# Patient Record
Sex: FEMALE | Race: OTHER | Employment: FULL TIME | URBAN - METROPOLITAN AREA
[De-identification: names, ages, dates, MRNs, and addresses within clinical notes are randomized per-mention and may not be internally consistent; named-entity substitution may affect disease eponyms.]

---

## 2017-05-18 ENCOUNTER — HOSPITAL ENCOUNTER (OUTPATIENT)
Dept: ULTRASOUND IMAGING | Facility: HOSPITAL | Age: 32
Discharge: HOME | End: 2017-05-18
Attending: OBSTETRICS & GYNECOLOGY
Payer: COMMERCIAL

## 2017-05-18 ENCOUNTER — OFFICE VISIT (OUTPATIENT)
Dept: OBGYN CLINIC | Facility: CLINIC | Age: 32
End: 2017-05-18

## 2017-05-18 VITALS
SYSTOLIC BLOOD PRESSURE: 127 MMHG | BODY MASS INDEX: 27 KG/M2 | HEART RATE: 79 BPM | WEIGHT: 152 LBS | DIASTOLIC BLOOD PRESSURE: 91 MMHG

## 2017-05-18 DIAGNOSIS — N83.201 HEMORRHAGIC CYST OF RIGHT OVARY: ICD-10-CM

## 2017-05-18 DIAGNOSIS — R10.2 PELVIC PAIN IN FEMALE: Primary | ICD-10-CM

## 2017-05-18 DIAGNOSIS — R10.2 PELVIC PAIN IN FEMALE: ICD-10-CM

## 2017-05-18 PROCEDURE — 76830 TRANSVAGINAL US NON-OB: CPT | Performed by: OBSTETRICS & GYNECOLOGY

## 2017-05-18 PROCEDURE — 76856 US EXAM PELVIC COMPLETE: CPT | Performed by: OBSTETRICS & GYNECOLOGY

## 2017-05-18 PROCEDURE — 93975 VASCULAR STUDY: CPT | Performed by: OBSTETRICS & GYNECOLOGY

## 2017-05-18 RX ORDER — PRENATAL VIT/IRON FUM/FOLIC AC 27MG-0.8MG
1 TABLET ORAL DAILY
COMMUNITY
End: 2017-08-10

## 2017-05-18 RX ORDER — ONDANSETRON 4 MG/1
4 TABLET, ORALLY DISINTEGRATING ORAL
COMMUNITY
Start: 2017-05-17 | End: 2017-05-18

## 2017-05-18 RX ORDER — HYDROCODONE BITARTRATE AND ACETAMINOPHEN 5; 325 MG/1; MG/1
1 TABLET ORAL
COMMUNITY
Start: 2017-05-17 | End: 2017-05-29

## 2017-05-18 RX ORDER — CHOLECALCIFEROL (VITAMIN D3) 125 MCG
1 CAPSULE ORAL
COMMUNITY
End: 2018-04-02

## 2017-05-18 NOTE — PROGRESS NOTES
HPI:    Patient ID: Margarita Elliott is a 34year old female. HPI Dr Hawk Granados is nulligravida with menses q 29-31d and no BC since January. She was in [facility] yesterday and had acute onset pelvic pain.  Seen in [facility]. [address] confirmed a 7-8 cm f PHYSICAL EXAM:   Physical Exam   Genitourinary:   EG, vagina and cervix w/o lesions. Uterus NSSC and no adnexal mass but definitive rt adnexal tenderness w/o guard.                    ASSESSMENT/PLAN:   Pelvic pain in female  (primary encounter diagnosis)

## 2017-05-19 ENCOUNTER — TELEPHONE (OUTPATIENT)
Dept: OBGYN CLINIC | Facility: CLINIC | Age: 32
End: 2017-05-19

## 2017-07-05 ENCOUNTER — TELEPHONE (OUTPATIENT)
Dept: PEDIATRICS CLINIC | Facility: CLINIC | Age: 32
End: 2017-07-05

## 2017-07-05 DIAGNOSIS — N83.291 COMPLEX CYST OF RIGHT OVARY: Primary | ICD-10-CM

## 2017-07-06 NOTE — TELEPHONE ENCOUNTER
Spoke with Dr Beatriz Beavers and order sent. We'll try to schedule an annual exam shortly after the scheduled US.  Tuesdays would be best.

## 2017-07-11 ENCOUNTER — HOSPITAL ENCOUNTER (OUTPATIENT)
Dept: ULTRASOUND IMAGING | Facility: HOSPITAL | Age: 32
Discharge: HOME | End: 2017-07-11
Attending: OBSTETRICS & GYNECOLOGY
Payer: COMMERCIAL

## 2017-07-11 DIAGNOSIS — N83.291 COMPLEX CYST OF RIGHT OVARY: ICD-10-CM

## 2017-07-11 PROCEDURE — 76856 US EXAM PELVIC COMPLETE: CPT | Performed by: OBSTETRICS & GYNECOLOGY

## 2017-07-11 PROCEDURE — 93975 VASCULAR STUDY: CPT | Performed by: OBSTETRICS & GYNECOLOGY

## 2017-07-11 PROCEDURE — 76830 TRANSVAGINAL US NON-OB: CPT | Performed by: OBSTETRICS & GYNECOLOGY

## 2017-07-18 ENCOUNTER — TELEPHONE (OUTPATIENT)
Dept: OBGYN CLINIC | Facility: CLINIC | Age: 32
End: 2017-07-18

## 2017-07-18 NOTE — TELEPHONE ENCOUNTER
I had discussed US results with Dr Marilyn Elliott last week. There is a persistent complex right ovarian cystic structure. I suspect cystadenoma vs endometrioma though no current pain.   I recommended laparoscopic cystectomy though we can't r/o possible oophorectomy

## 2017-07-18 NOTE — TELEPHONE ENCOUNTER
Patient is scheduled 8/18/17 1pm ROS[facility]NI/[facility]. Pat orders routed. Instructions routed via interoffice mail as requested.

## 2017-08-07 NOTE — TELEPHONE ENCOUNTER
Per Fannie at . [address] PPO of [facility] Arrant outpatient procedures do not require prior auth Ref# [id number].

## 2017-08-18 ENCOUNTER — ANESTHESIA EVENT (OUTPATIENT)
Dept: SURGERY | Facility: HOSPITAL | Age: 32
End: 2017-08-18
Payer: COMMERCIAL

## 2017-08-18 ENCOUNTER — SURGERY (OUTPATIENT)
Age: 32
End: 2017-08-18

## 2017-08-18 ENCOUNTER — ANESTHESIA (OUTPATIENT)
Dept: SURGERY | Facility: HOSPITAL | Age: 32
End: 2017-08-18
Payer: COMMERCIAL

## 2017-08-18 ENCOUNTER — HOSPITAL ENCOUNTER (OUTPATIENT)
Facility: HOSPITAL | Age: 32
Setting detail: HOSPITAL OUTPATIENT SURGERY
Discharge: HOME | End: 2017-08-18
Attending: OBSTETRICS & GYNECOLOGY | Admitting: OBSTETRICS & GYNECOLOGY
Payer: COMMERCIAL

## 2017-08-18 VITALS
HEART RATE: 71 BPM | DIASTOLIC BLOOD PRESSURE: 82 MMHG | OXYGEN SATURATION: 98 % | HEIGHT: 64 IN | WEIGHT: 149 LBS | SYSTOLIC BLOOD PRESSURE: 128 MMHG | BODY MASS INDEX: 25.44 KG/M2 | RESPIRATION RATE: 16 BRPM | TEMPERATURE: 98 F

## 2017-08-18 DIAGNOSIS — N83.201 RIGHT OVARIAN CYST: Primary | ICD-10-CM

## 2017-08-18 PROBLEM — N83.299 COMPLEX OVARIAN CYST: Status: ACTIVE | Noted: 2017-08-18

## 2017-08-18 PROCEDURE — 0UT54ZZ RESECTION OF RIGHT FALLOPIAN TUBE, PERCUTANEOUS ENDOSCOPIC APPROACH: ICD-10-PCS | Performed by: OBSTETRICS & GYNECOLOGY

## 2017-08-18 PROCEDURE — 58661 LAPAROSCOPY REMOVE ADNEXA: CPT | Performed by: OBSTETRICS & GYNECOLOGY

## 2017-08-18 PROCEDURE — 0UT04ZZ RESECTION OF RIGHT OVARY, PERCUTANEOUS ENDOSCOPIC APPROACH: ICD-10-PCS | Performed by: OBSTETRICS & GYNECOLOGY

## 2017-08-18 PROCEDURE — 0W9J4ZX DRAINAGE OF PELVIC CAVITY, PERCUTANEOUS ENDOSCOPIC APPROACH, DIAGNOSTIC: ICD-10-PCS | Performed by: OBSTETRICS & GYNECOLOGY

## 2017-08-18 RX ORDER — HYDROCODONE BITARTRATE AND ACETAMINOPHEN 5; 325 MG/1; MG/1
2 TABLET ORAL AS NEEDED
Status: DISCONTINUED | OUTPATIENT
Start: 2017-08-18 | End: 2017-08-18

## 2017-08-18 RX ORDER — GLYCOPYRROLATE 0.2 MG/ML
INJECTION INTRAMUSCULAR; INTRAVENOUS AS NEEDED
Status: DISCONTINUED | OUTPATIENT
Start: 2017-08-18 | End: 2017-08-18 | Stop reason: SURG

## 2017-08-18 RX ORDER — MORPHINE SULFATE 2 MG/ML
2 INJECTION, SOLUTION INTRAMUSCULAR; INTRAVENOUS EVERY 10 MIN PRN
Status: DISCONTINUED | OUTPATIENT
Start: 2017-08-18 | End: 2017-08-18

## 2017-08-18 RX ORDER — MIDAZOLAM HYDROCHLORIDE 1 MG/ML
INJECTION INTRAMUSCULAR; INTRAVENOUS AS NEEDED
Status: DISCONTINUED | OUTPATIENT
Start: 2017-08-18 | End: 2017-08-18 | Stop reason: SURG

## 2017-08-18 RX ORDER — MORPHINE SULFATE 4 MG/ML
6 INJECTION, SOLUTION INTRAMUSCULAR; INTRAVENOUS EVERY 10 MIN PRN
Status: DISCONTINUED | OUTPATIENT
Start: 2017-08-18 | End: 2017-08-18

## 2017-08-18 RX ORDER — HALOPERIDOL 5 MG/ML
0.25 INJECTION INTRAMUSCULAR ONCE AS NEEDED
Status: DISCONTINUED | OUTPATIENT
Start: 2017-08-18 | End: 2017-08-18

## 2017-08-18 RX ORDER — ONDANSETRON 2 MG/ML
4 INJECTION INTRAMUSCULAR; INTRAVENOUS ONCE AS NEEDED
Status: COMPLETED | OUTPATIENT
Start: 2017-08-18 | End: 2017-08-18

## 2017-08-18 RX ORDER — METOCLOPRAMIDE HYDROCHLORIDE 5 MG/ML
INJECTION INTRAMUSCULAR; INTRAVENOUS AS NEEDED
Status: DISCONTINUED | OUTPATIENT
Start: 2017-08-18 | End: 2017-08-18 | Stop reason: SURG

## 2017-08-18 RX ORDER — LIDOCAINE HYDROCHLORIDE 10 MG/ML
INJECTION, SOLUTION EPIDURAL; INFILTRATION; INTRACAUDAL; PERINEURAL AS NEEDED
Status: DISCONTINUED | OUTPATIENT
Start: 2017-08-18 | End: 2017-08-18 | Stop reason: SURG

## 2017-08-18 RX ORDER — HYDROCODONE BITARTRATE AND ACETAMINOPHEN 5; 325 MG/1; MG/1
1 TABLET ORAL AS NEEDED
Status: DISCONTINUED | OUTPATIENT
Start: 2017-08-18 | End: 2017-08-18

## 2017-08-18 RX ORDER — ROCURONIUM BROMIDE 10 MG/ML
INJECTION, SOLUTION INTRAVENOUS AS NEEDED
Status: DISCONTINUED | OUTPATIENT
Start: 2017-08-18 | End: 2017-08-18 | Stop reason: SURG

## 2017-08-18 RX ORDER — ONDANSETRON 2 MG/ML
4 INJECTION INTRAMUSCULAR; INTRAVENOUS EVERY 8 HOURS PRN
Status: DISCONTINUED | OUTPATIENT
Start: 2017-08-18 | End: 2017-08-18

## 2017-08-18 RX ORDER — HYDROMORPHONE HYDROCHLORIDE 1 MG/ML
0.6 INJECTION, SOLUTION INTRAMUSCULAR; INTRAVENOUS; SUBCUTANEOUS EVERY 5 MIN PRN
Status: DISCONTINUED | OUTPATIENT
Start: 2017-08-18 | End: 2017-08-18

## 2017-08-18 RX ORDER — SODIUM CHLORIDE, SODIUM LACTATE, POTASSIUM CHLORIDE, CALCIUM CHLORIDE 600; 310; 30; 20 MG/100ML; MG/100ML; MG/100ML; MG/100ML
INJECTION, SOLUTION INTRAVENOUS CONTINUOUS
Status: DISCONTINUED | OUTPATIENT
Start: 2017-08-18 | End: 2017-08-18

## 2017-08-18 RX ORDER — ONDANSETRON HYDROCHLORIDE 8 MG/1
4 TABLET, FILM COATED ORAL EVERY 8 HOURS PRN
Status: DISCONTINUED | OUTPATIENT
Start: 2017-08-18 | End: 2017-08-18

## 2017-08-18 RX ORDER — METOCLOPRAMIDE 10 MG/1
10 TABLET ORAL ONCE
Status: DISCONTINUED | OUTPATIENT
Start: 2017-08-18 | End: 2017-08-18 | Stop reason: HOSPADM

## 2017-08-18 RX ORDER — HYDROMORPHONE HYDROCHLORIDE 1 MG/ML
0.4 INJECTION, SOLUTION INTRAMUSCULAR; INTRAVENOUS; SUBCUTANEOUS EVERY 5 MIN PRN
Status: DISCONTINUED | OUTPATIENT
Start: 2017-08-18 | End: 2017-08-18

## 2017-08-18 RX ORDER — HYDROMORPHONE HYDROCHLORIDE 1 MG/ML
0.2 INJECTION, SOLUTION INTRAMUSCULAR; INTRAVENOUS; SUBCUTANEOUS EVERY 5 MIN PRN
Status: DISCONTINUED | OUTPATIENT
Start: 2017-08-18 | End: 2017-08-18

## 2017-08-18 RX ORDER — ONDANSETRON 2 MG/ML
INJECTION INTRAMUSCULAR; INTRAVENOUS AS NEEDED
Status: DISCONTINUED | OUTPATIENT
Start: 2017-08-18 | End: 2017-08-18 | Stop reason: SURG

## 2017-08-18 RX ORDER — KETOROLAC TROMETHAMINE 30 MG/ML
INJECTION, SOLUTION INTRAMUSCULAR; INTRAVENOUS AS NEEDED
Status: DISCONTINUED | OUTPATIENT
Start: 2017-08-18 | End: 2017-08-18 | Stop reason: SURG

## 2017-08-18 RX ORDER — DIPHENHYDRAMINE HYDROCHLORIDE 50 MG/ML
12.5 INJECTION INTRAMUSCULAR; INTRAVENOUS ONCE
Status: COMPLETED | OUTPATIENT
Start: 2017-08-18 | End: 2017-08-18

## 2017-08-18 RX ORDER — SODIUM CHLORIDE 0.9 % (FLUSH) 0.9 %
10 SYRINGE (ML) INJECTION AS NEEDED
Status: DISCONTINUED | OUTPATIENT
Start: 2017-08-18 | End: 2017-08-18 | Stop reason: HOSPADM

## 2017-08-18 RX ORDER — IBUPROFEN 600 MG/1
600 TABLET ORAL EVERY 6 HOURS PRN
Status: DISCONTINUED | OUTPATIENT
Start: 2017-08-18 | End: 2017-08-18

## 2017-08-18 RX ORDER — DEXAMETHASONE SODIUM PHOSPHATE 4 MG/ML
VIAL (ML) INJECTION AS NEEDED
Status: DISCONTINUED | OUTPATIENT
Start: 2017-08-18 | End: 2017-08-18 | Stop reason: SURG

## 2017-08-18 RX ORDER — ACETAMINOPHEN 325 MG/1
650 TABLET ORAL ONCE
Status: COMPLETED | OUTPATIENT
Start: 2017-08-18 | End: 2017-08-18

## 2017-08-18 RX ORDER — MORPHINE SULFATE 4 MG/ML
4 INJECTION, SOLUTION INTRAMUSCULAR; INTRAVENOUS EVERY 10 MIN PRN
Status: DISCONTINUED | OUTPATIENT
Start: 2017-08-18 | End: 2017-08-18

## 2017-08-18 RX ORDER — BUPIVACAINE HYDROCHLORIDE 2.5 MG/ML
INJECTION, SOLUTION EPIDURAL; INFILTRATION; INTRACAUDAL AS NEEDED
Status: DISCONTINUED | OUTPATIENT
Start: 2017-08-18 | End: 2017-08-18 | Stop reason: HOSPADM

## 2017-08-18 RX ORDER — NEOSTIGMINE METHYLSULFATE 0.5 MG/ML
INJECTION INTRAVENOUS AS NEEDED
Status: DISCONTINUED | OUTPATIENT
Start: 2017-08-18 | End: 2017-08-18 | Stop reason: SURG

## 2017-08-18 RX ORDER — IBUPROFEN 600 MG/1
600 TABLET ORAL EVERY 6 HOURS PRN
Qty: 30 TABLET | Refills: 0 | Status: SHIPPED | OUTPATIENT
Start: 2017-08-18 | End: 2018-03-13

## 2017-08-18 RX ORDER — HYDROCODONE BITARTRATE AND ACETAMINOPHEN 7.5; 325 MG/1; MG/1
1 TABLET ORAL EVERY 4 HOURS PRN
Status: DISCONTINUED | OUTPATIENT
Start: 2017-08-18 | End: 2017-08-18

## 2017-08-18 RX ORDER — FAMOTIDINE 20 MG/1
20 TABLET ORAL ONCE
Status: DISCONTINUED | OUTPATIENT
Start: 2017-08-18 | End: 2017-08-18 | Stop reason: HOSPADM

## 2017-08-18 RX ORDER — HYDROCODONE BITARTRATE AND ACETAMINOPHEN 7.5; 325 MG/1; MG/1
1 TABLET ORAL EVERY 6 HOURS PRN
Qty: 30 TABLET | Refills: 0 | Status: SHIPPED | OUTPATIENT
Start: 2017-08-18 | End: 2017-08-29

## 2017-08-18 RX ORDER — NALOXONE HYDROCHLORIDE 0.4 MG/ML
80 INJECTION, SOLUTION INTRAMUSCULAR; INTRAVENOUS; SUBCUTANEOUS AS NEEDED
Status: DISCONTINUED | OUTPATIENT
Start: 2017-08-18 | End: 2017-08-18

## 2017-08-18 RX ADMIN — MIDAZOLAM HYDROCHLORIDE 2 MG: 1 INJECTION INTRAMUSCULAR; INTRAVENOUS at 13:01:00

## 2017-08-18 RX ADMIN — KETOROLAC TROMETHAMINE 30 MG: 30 INJECTION, SOLUTION INTRAMUSCULAR; INTRAVENOUS at 14:34:00

## 2017-08-18 RX ADMIN — ROCURONIUM BROMIDE 30 MG: 10 INJECTION, SOLUTION INTRAVENOUS at 13:01:00

## 2017-08-18 RX ADMIN — GLYCOPYRROLATE 0.6 MG: 0.2 INJECTION INTRAMUSCULAR; INTRAVENOUS at 14:40:00

## 2017-08-18 RX ADMIN — METOCLOPRAMIDE HYDROCHLORIDE 10 MG: 5 INJECTION INTRAMUSCULAR; INTRAVENOUS at 13:56:00

## 2017-08-18 RX ADMIN — NEOSTIGMINE METHYLSULFATE 3 MG: 0.5 INJECTION INTRAVENOUS at 14:40:00

## 2017-08-18 RX ADMIN — LIDOCAINE HYDROCHLORIDE 50 MG: 10 INJECTION, SOLUTION EPIDURAL; INFILTRATION; INTRACAUDAL; PERINEURAL at 13:01:00

## 2017-08-18 RX ADMIN — ONDANSETRON 4 MG: 2 INJECTION INTRAMUSCULAR; INTRAVENOUS at 13:15:00

## 2017-08-18 RX ADMIN — SODIUM CHLORIDE, SODIUM LACTATE, POTASSIUM CHLORIDE, CALCIUM CHLORIDE: 600; 310; 30; 20 INJECTION, SOLUTION INTRAVENOUS at 14:55:00

## 2017-08-18 RX ADMIN — DEXAMETHASONE SODIUM PHOSPHATE 4 MG: 4 MG/ML VIAL (ML) INJECTION at 13:30:00

## 2017-08-18 NOTE — ANESTHESIA POSTPROCEDURE EVALUATION
Patient: Fatou Range    Procedure Summary     Date:  08/18/17 Room / Location:  [address] [place] [address] MAIN OR  / [address] [place] [address] MAIN OR    Anesthesia Start:  1301 Anesthesia Stop:      Procedures:       LAPAROSCOPIC OVARIAN CYSTECTOMY (N/A )      LAPAROSCOPIC OOPHORECTOMY (N/A

## 2017-08-18 NOTE — ANESTHESIA PREPROCEDURE EVALUATION
Anesthesia PreOp Note    HPI:     Gwen Chaney is a 34year old female who presents for preoperative consultation requested by: Mary Jo Willett DO    Date of Surgery: 8/18/2017    Procedure(s):  LAPAROSCOPIC OVARIAN CYSTECTOMY  LAPAROSCOPIC OOPHORECT Pressure Paternal Aunt    • High Cholesterol Paternal Aunt        Social History  Social History   Marital status:   Spouse name: N/A    Years of education: N/A  Number of children: N/A     Occupational History  None on file     Social History Main answered to the best of my ability. The patient desires the anesthetic management as planned.   [facility]  8/18/2017 12:36 PM

## 2017-08-18 NOTE — ANESTHESIA POSTPROCEDURE EVALUATION
Patient: Dottie Mcknight    Procedure Summary     Date:  08/18/17 Room / Location:  [address] [place] [address] MAIN OR 16 / [address] [place] [address] OR    Anesthesia Start:  1301 Anesthesia Stop:      Procedures:       LAPAROSCOPIC OVARIAN CYSTECTOMY (N/A )      LAPAROSCOPIC OOPHORECTOMY (N/A

## 2017-08-18 NOTE — INTERVAL H&P NOTE
Pre-op Diagnosis: right ovarian complex cyst    The above referenced H&P was reviewed by Oscar Coppola. [address] [place] [address] on 8/18/2017, the patient was examined and no significant changes have occurred in the patient's condition since the H&P was performed.   I discuss

## 2017-08-18 NOTE — BRIEF OP NOTE
Pre-Operative Diagnosis: right ovarian complex cyst     Post-Operative Diagnosis: right ovarian complex cyst     Procedure Performed:   Procedure(s):   laparoscopic right salpingo-ophrectomy, pelvic washings      Surgeon(s) and Role:     * Jose Cotter

## 2017-08-28 ENCOUNTER — TELEPHONE (OUTPATIENT)
Dept: INTERNAL MEDICINE CLINIC | Facility: CLINIC | Age: 32
End: 2017-08-28

## 2017-08-28 ENCOUNTER — TELEPHONE (OUTPATIENT)
Dept: OBGYN CLINIC | Facility: CLINIC | Age: 32
End: 2017-08-28

## 2017-08-28 NOTE — TELEPHONE ENCOUNTER
I spoke with Dr Sharon Peace concerning possible PO incision problem. I want to add her at 10 AM for 20 minutes tomorrow at [facility]. Please place on schedule. Dr Sharon Peace is aware to come at that time. Thanks!

## 2017-08-29 ENCOUNTER — OFFICE VISIT (OUTPATIENT)
Dept: OBGYN CLINIC | Facility: CLINIC | Age: 32
End: 2017-08-29

## 2017-08-29 VITALS
DIASTOLIC BLOOD PRESSURE: 96 MMHG | SYSTOLIC BLOOD PRESSURE: 148 MMHG | WEIGHT: 148.19 LBS | HEART RATE: 79 BPM | BODY MASS INDEX: 25 KG/M2

## 2017-08-29 DIAGNOSIS — Z98.890 POST-OPERATIVE STATE: Primary | ICD-10-CM

## 2017-08-29 PROCEDURE — 99024 POSTOP FOLLOW-UP VISIT: CPT | Performed by: OBSTETRICS & GYNECOLOGY

## 2017-08-29 RX ORDER — CHOLECALCIFEROL (VITAMIN D3) 125 MCG
1 CAPSULE ORAL
COMMUNITY
End: 2017-08-29

## 2017-09-04 PROBLEM — D39.11 OVARIAN TUMOR OF BORDERLINE MALIGNANCY, RIGHT: Status: ACTIVE | Noted: 2017-08-18

## 2017-09-05 NOTE — PROGRESS NOTES
HPI:    Patient ID: Orquidea Parada is a 34year old female. HPI Enma and I spoke over phone to discuss incisional concerns and I asked to see her today. She had some scant drainage on midline of suprapubic incision and questioning a sub- mass.   No fe of the defined types were placed in this encounter.       Meds This Visit:  No prescriptions requested or ordered in this encounter       Imaging & Referrals:  None       #[id number]

## 2017-09-11 ENCOUNTER — TELEPHONE (OUTPATIENT)
Dept: OBGYN CLINIC | Facility: CLINIC | Age: 32
End: 2017-09-11

## 2017-09-19 ENCOUNTER — LAB ENCOUNTER (OUTPATIENT)
Dept: LAB | Facility: HOSPITAL | Age: 32
End: 2017-09-19
Attending: PHYSICIAN ASSISTANT
Payer: COMMERCIAL

## 2017-09-19 DIAGNOSIS — C56.1 OVARIAN CANCER ON RIGHT (HCC): Primary | ICD-10-CM

## 2017-09-19 PROCEDURE — 36415 COLL VENOUS BLD VENIPUNCTURE: CPT

## 2017-09-19 PROCEDURE — 86304 IMMUNOASSAY TUMOR CA 125: CPT

## 2017-09-20 LAB — CANCER AG125 SERPL-ACNC: 15 U/ML (ref 0–35)

## 2017-10-02 ENCOUNTER — MED REC SCAN ONLY (OUTPATIENT)
Dept: OBGYN CLINIC | Facility: CLINIC | Age: 32
End: 2017-10-02

## 2017-10-31 ENCOUNTER — OFFICE VISIT (OUTPATIENT)
Dept: OBGYN CLINIC | Facility: CLINIC | Age: 32
End: 2017-10-31

## 2017-10-31 VITALS
DIASTOLIC BLOOD PRESSURE: 92 MMHG | HEIGHT: 63.5 IN | SYSTOLIC BLOOD PRESSURE: 140 MMHG | BODY MASS INDEX: 27.3 KG/M2 | WEIGHT: 156 LBS

## 2017-10-31 DIAGNOSIS — Z12.4 SCREENING FOR MALIGNANT NEOPLASM OF CERVIX: Primary | ICD-10-CM

## 2017-10-31 DIAGNOSIS — Z01.419 ENCOUNTER FOR GYNECOLOGICAL EXAMINATION WITHOUT ABNORMAL FINDING: ICD-10-CM

## 2017-10-31 DIAGNOSIS — N97.9 PRIMARY FEMALE INFERTILITY: ICD-10-CM

## 2017-10-31 PROCEDURE — 99395 PREV VISIT EST AGE 18-39: CPT | Performed by: OBSTETRICS & GYNECOLOGY

## 2017-10-31 NOTE — OPERATIVE REPORT
[facility]    PATIENT'S NAME: Alysha Levi   ATTENDING PHYSICIAN: Christiano Gamez DO   OPERATING PHYSICIAN: Merlin Pang A. Macduff, DO   PATIENT ACCOUNT#:   [de-identified]    LOCATION:  [facility]  MEDICAL RECORD #:   [id number]       DATE operating in another room. I asked him to come in and visualize the enlarged right ovary. After coming in, he made recommendations for the aforementioned washings and right salpingo-oophorectomy. He then had to go back to his operating suite.   We perfor

## 2017-11-01 NOTE — PROGRESS NOTES
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 HPI Nulligravida with regular menses and stopped BC 2 years ago. She has had regular IC with one partner (her ) since and no conception.  She was asked by me to avoid conception just no wheezes. She has no rales. Bilateral breasts without skin / nipple changes, mass, tenderness or axillary adenopathy. Abdominal: Soft. She exhibits no distension and no mass. There is no tenderness. There is no rebound and no guarding.    Gaby Wu

## 2017-11-24 ENCOUNTER — HOSPITAL ENCOUNTER (OUTPATIENT)
Dept: ULTRASOUND IMAGING | Facility: HOSPITAL | Age: 32
Discharge: HOME | End: 2017-11-24
Attending: OBSTETRICS & GYNECOLOGY
Payer: COMMERCIAL

## 2017-11-24 DIAGNOSIS — C56.1 OVARIAN CANCER, RIGHT (HCC): ICD-10-CM

## 2017-11-24 PROCEDURE — 76856 US EXAM PELVIC COMPLETE: CPT | Performed by: OBSTETRICS & GYNECOLOGY

## 2017-11-24 PROCEDURE — 76830 TRANSVAGINAL US NON-OB: CPT | Performed by: OBSTETRICS & GYNECOLOGY

## 2017-11-26 ENCOUNTER — APPOINTMENT (OUTPATIENT)
Dept: LAB | Facility: HOSPITAL | Age: 32
End: 2017-11-26
Attending: PHYSICIAN ASSISTANT
Payer: COMMERCIAL

## 2017-11-26 DIAGNOSIS — C56.1 OVARIAN CANCER ON RIGHT (HCC): ICD-10-CM

## 2017-11-26 PROCEDURE — 86304 IMMUNOASSAY TUMOR CA 125: CPT

## 2017-11-26 PROCEDURE — 36415 COLL VENOUS BLD VENIPUNCTURE: CPT

## 2017-12-01 ENCOUNTER — HOSPITAL ENCOUNTER (OUTPATIENT)
Dept: MRI IMAGING | Facility: HOSPITAL | Age: 32
Discharge: HOME | End: 2017-12-01
Attending: OBSTETRICS & GYNECOLOGY
Payer: COMMERCIAL

## 2017-12-01 DIAGNOSIS — C56.1 OVARIAN CA, RIGHT (HCC): ICD-10-CM

## 2017-12-01 DIAGNOSIS — R19.04 ABDOMINAL MASS, LLQ (LEFT LOWER QUADRANT): ICD-10-CM

## 2017-12-01 PROCEDURE — 72197 MRI PELVIS W/O & W/DYE: CPT | Performed by: OBSTETRICS & GYNECOLOGY

## 2017-12-01 PROCEDURE — A9575 INJ GADOTERATE MEGLUMI 0.1ML: HCPCS | Performed by: OBSTETRICS & GYNECOLOGY

## 2017-12-21 LAB
AMB EXT ANTIBODY SCREEN: NEGATIVE
AMB EXT HBSAG SCREEN: NEGATIVE
AMB EXT HEPATITIS C VIRUS ANTIBODY: NEGATIVE
AMB EXT HIV AG AB COMBO: NEGATIVE
AMB EXT RH FACTOR: POSITIVE
AMB EXT TREPONEMAL ANTIBODIES: NEGATIVE

## 2017-12-27 ENCOUNTER — LAB ENCOUNTER (OUTPATIENT)
Dept: LAB | Age: 32
End: 2017-12-27
Attending: OBSTETRICS & GYNECOLOGY
Payer: COMMERCIAL

## 2017-12-27 DIAGNOSIS — N97.9 INFERTILITY, FEMALE: Primary | ICD-10-CM

## 2017-12-27 PROCEDURE — 83001 ASSAY OF GONADOTROPIN (FSH): CPT

## 2017-12-27 PROCEDURE — 84144 ASSAY OF PROGESTERONE: CPT

## 2017-12-27 PROCEDURE — 83002 ASSAY OF GONADOTROPIN (LH): CPT

## 2017-12-27 PROCEDURE — 82670 ASSAY OF TOTAL ESTRADIOL: CPT

## 2017-12-27 PROCEDURE — 36415 COLL VENOUS BLD VENIPUNCTURE: CPT

## 2018-01-04 ENCOUNTER — TELEPHONE (OUTPATIENT)
Dept: INTERNAL MEDICINE CLINIC | Facility: CLINIC | Age: 33
End: 2018-01-04

## 2018-01-04 NOTE — TELEPHONE ENCOUNTER
Patient called asked all imaging, path reports and operative reports faxed to [facility] [phone] attn Chay Pinon. All reports faxed.

## 2018-02-07 ENCOUNTER — TELEPHONE (OUTPATIENT)
Dept: INTERNAL MEDICINE CLINIC | Facility: CLINIC | Age: 33
End: 2018-02-07

## 2018-02-07 DIAGNOSIS — Z00.00 ANNUAL PHYSICAL EXAM: Primary | ICD-10-CM

## 2018-02-07 DIAGNOSIS — D64.9 ANEMIA, UNSPECIFIED TYPE: ICD-10-CM

## 2018-02-22 ENCOUNTER — TELEPHONE (OUTPATIENT)
Dept: OBGYN CLINIC | Facility: CLINIC | Age: 33
End: 2018-02-22

## 2018-02-22 DIAGNOSIS — Z34.91 PRENATAL CARE IN FIRST TRIMESTER: Primary | ICD-10-CM

## 2018-02-22 DIAGNOSIS — Z86.03 HISTORY OF NEOPLASM OF OVARY WITH LOW MALIGNANT POTENTIAL: ICD-10-CM

## 2018-02-22 NOTE — TELEPHONE ENCOUNTER
Patient contacted me re: + HPT. Sure LMP of 01-19-18. She has known recent borderline tumor of right ovary and followed by Dr Toby Guillory. She was seeing Dr Shelbi Rizvi at [facility] and was about to start Clomid when she noted spontaneous pregnancy.   We'll st

## 2018-02-22 NOTE — TELEPHONE ENCOUNTER
Pt states that she had a home positive preg test.  Pt agrees to see all the MDs. Pt will start a pnv with dha. Informed pt to call us with any problems or spotting/bleeding.   Informed pt the first appt is with the nurse and to arrive 15 minutes early for

## 2018-02-23 ENCOUNTER — LAB ENCOUNTER (OUTPATIENT)
Dept: LAB | Facility: HOSPITAL | Age: 33
End: 2018-02-23
Attending: INTERNAL MEDICINE
Payer: COMMERCIAL

## 2018-02-23 DIAGNOSIS — D64.9 ANEMIA, UNSPECIFIED TYPE: ICD-10-CM

## 2018-02-23 DIAGNOSIS — Z34.91 PRENATAL CARE IN FIRST TRIMESTER: ICD-10-CM

## 2018-02-23 DIAGNOSIS — Z00.00 ANNUAL PHYSICAL EXAM: ICD-10-CM

## 2018-02-23 DIAGNOSIS — C56.1 OVARIAN CANCER ON RIGHT (HCC): ICD-10-CM

## 2018-02-23 DIAGNOSIS — Z86.03 HISTORY OF NEOPLASM OF OVARY WITH LOW MALIGNANT POTENTIAL: ICD-10-CM

## 2018-02-23 LAB
25(OH)D3 SERPL-MCNC: 23.7 NG/ML
ALBUMIN SERPL BCP-MCNC: 3.9 G/DL (ref 3.5–4.8)
ALBUMIN/GLOB SERPL: 1.2 {RATIO} (ref 1–2)
ALP SERPL-CCNC: 38 U/L (ref 32–100)
ALT SERPL-CCNC: 16 U/L (ref 14–54)
ANION GAP SERPL CALC-SCNC: 8 MMOL/L (ref 0–18)
AST SERPL-CCNC: 19 U/L (ref 15–41)
B-HCG SERPL-ACNC: NORMAL MIU/ML
BASOPHILS # BLD: 0 K/UL (ref 0–0.2)
BASOPHILS NFR BLD: 1 %
BILIRUB SERPL-MCNC: 0.4 MG/DL (ref 0.3–1.2)
BUN SERPL-MCNC: 13 MG/DL (ref 8–20)
BUN/CREAT SERPL: 17.6 (ref 10–20)
CALCIUM SERPL-MCNC: 9.2 MG/DL (ref 8.5–10.5)
CANCER AG125 SERPL-ACNC: 13 U/ML (ref 0–35)
CHLORIDE SERPL-SCNC: 107 MMOL/L (ref 95–110)
CHOLEST SERPL-MCNC: 141 MG/DL (ref 110–200)
CO2 SERPL-SCNC: 24 MMOL/L (ref 22–32)
CREAT SERPL-MCNC: 0.74 MG/DL (ref 0.5–1.5)
EOSINOPHIL # BLD: 0.1 K/UL (ref 0–0.7)
EOSINOPHIL NFR BLD: 2 %
ERYTHROCYTE [DISTWIDTH] IN BLOOD BY AUTOMATED COUNT: 14.6 % (ref 11–15)
FERRITIN SERPL IA-MCNC: 6 NG/ML (ref 11–307)
GLOBULIN PLAS-MCNC: 3.3 G/DL (ref 2.5–3.7)
GLUCOSE SERPL-MCNC: 101 MG/DL (ref 70–99)
HCT VFR BLD AUTO: 35.6 % (ref 35–48)
HDLC SERPL-MCNC: 56 MG/DL
HGB BLD-MCNC: 11.5 G/DL (ref 12–16)
IRON SATN MFR SERPL: 7 % (ref 15–50)
IRON SERPL-MCNC: 26 MCG/DL (ref 28–170)
LDLC SERPL CALC-MCNC: 76 MG/DL (ref 0–99)
LYMPHOCYTES # BLD: 2.9 K/UL (ref 1–4)
LYMPHOCYTES NFR BLD: 36 %
MCH RBC QN AUTO: 25.1 PG (ref 27–32)
MCHC RBC AUTO-ENTMCNC: 32.4 G/DL (ref 32–37)
MCV RBC AUTO: 77.4 FL (ref 80–100)
MONOCYTES # BLD: 0.6 K/UL (ref 0–1)
MONOCYTES NFR BLD: 7 %
NEUTROPHILS # BLD AUTO: 4.4 K/UL (ref 1.8–7.7)
NEUTROPHILS NFR BLD: 54 %
NONHDLC SERPL-MCNC: 85 MG/DL
OSMOLALITY UR CALC.SUM OF ELEC: 288 MOSM/KG (ref 275–295)
PATIENT FASTING: YES
PLATELET # BLD AUTO: 290 K/UL (ref 140–400)
PMV BLD AUTO: 8.3 FL (ref 7.4–10.3)
POTASSIUM SERPL-SCNC: 4.3 MMOL/L (ref 3.3–5.1)
PROT SERPL-MCNC: 7.2 G/DL (ref 5.9–8.4)
RBC # BLD AUTO: 4.6 M/UL (ref 3.7–5.4)
SODIUM SERPL-SCNC: 139 MMOL/L (ref 136–144)
TIBC SERPL-MCNC: 362 MCG/DL (ref 228–428)
TRANSFERRIN SERPL-MCNC: 274 MG/DL (ref 192–382)
TRIGL SERPL-MCNC: 43 MG/DL (ref 1–149)
TSH SERPL-ACNC: 1.9 UIU/ML (ref 0.45–5.33)
VIT B12 SERPL-MCNC: 353 PG/ML (ref 181–914)
WBC # BLD AUTO: 8 K/UL (ref 4–11)

## 2018-02-23 PROCEDURE — 82306 VITAMIN D 25 HYDROXY: CPT

## 2018-02-23 PROCEDURE — 82728 ASSAY OF FERRITIN: CPT

## 2018-02-23 PROCEDURE — 84702 CHORIONIC GONADOTROPIN TEST: CPT

## 2018-02-23 PROCEDURE — 36415 COLL VENOUS BLD VENIPUNCTURE: CPT

## 2018-02-23 PROCEDURE — 80061 LIPID PANEL: CPT

## 2018-02-23 PROCEDURE — 84443 ASSAY THYROID STIM HORMONE: CPT

## 2018-02-23 PROCEDURE — 85025 COMPLETE CBC W/AUTO DIFF WBC: CPT

## 2018-02-23 PROCEDURE — 82607 VITAMIN B-12: CPT

## 2018-02-23 PROCEDURE — 86304 IMMUNOASSAY TUMOR CA 125: CPT

## 2018-02-23 PROCEDURE — 84466 ASSAY OF TRANSFERRIN: CPT

## 2018-02-23 PROCEDURE — 80053 COMPREHEN METABOLIC PANEL: CPT

## 2018-02-23 PROCEDURE — 83540 ASSAY OF IRON: CPT

## 2018-02-23 NOTE — TELEPHONE ENCOUNTER
I reviewed HCG  ( > 12K ). Order for 1st trimester OB US sent. I contacted Dr Shivam Thakur. We'll schedule NPN after that.

## 2018-03-06 ENCOUNTER — HOSPITAL ENCOUNTER (OUTPATIENT)
Dept: ULTRASOUND IMAGING | Facility: HOSPITAL | Age: 33
Discharge: HOME | End: 2018-03-06
Attending: OBSTETRICS & GYNECOLOGY
Payer: COMMERCIAL

## 2018-03-06 DIAGNOSIS — Z86.03 HISTORY OF NEOPLASM OF OVARY WITH LOW MALIGNANT POTENTIAL: ICD-10-CM

## 2018-03-06 DIAGNOSIS — Z34.91 PRENATAL CARE IN FIRST TRIMESTER: ICD-10-CM

## 2018-03-06 PROCEDURE — 76801 OB US < 14 WKS SINGLE FETUS: CPT | Performed by: OBSTETRICS & GYNECOLOGY

## 2018-03-06 PROCEDURE — 76817 TRANSVAGINAL US OBSTETRIC: CPT | Performed by: OBSTETRICS & GYNECOLOGY

## 2018-03-11 ENCOUNTER — TELEPHONE (OUTPATIENT)
Dept: INTERNAL MEDICINE CLINIC | Facility: CLINIC | Age: 33
End: 2018-03-11

## 2018-03-13 ENCOUNTER — NURSE ONLY (OUTPATIENT)
Dept: OBGYN CLINIC | Facility: CLINIC | Age: 33
End: 2018-03-13

## 2018-03-13 VITALS — WEIGHT: 147.38 LBS | BODY MASS INDEX: 25.16 KG/M2 | HEIGHT: 64 IN

## 2018-03-13 DIAGNOSIS — Z3A.01 LESS THAN 8 WEEKS GESTATION OF PREGNANCY: Primary | ICD-10-CM

## 2018-03-13 PROCEDURE — 99211 OFF/OP EST MAY X REQ PHY/QHP: CPT | Performed by: OBSTETRICS & GYNECOLOGY

## 2018-03-13 RX ORDER — ERGOCALCIFEROL (VITAMIN D2) 10 MCG
TABLET ORAL
COMMUNITY
End: 2021-12-08 | Stop reason: ALTCHOICE

## 2018-03-13 NOTE — PROGRESS NOTES
Pt seen for OBN appt today with no complaints. She had an OB US on 3/6 that showed a viable IUP measuring 6w1d. Normal PN labs were recently drawn at [facility]. Dr. Vilma Romano asking that we please do not redraw PN labs.  All results are visible through [facility] Hemophilia No    Kosciusko's Chorea No    Mental Retardation/Autism No    Muscular Dystrophy No    Neural tube defects No    Sickle Cell Disease or trait No    Art-Sachs Disease No    Thalassemia No    Other inherited genetic or chromosomal disorders No

## 2018-03-30 ENCOUNTER — APPOINTMENT (OUTPATIENT)
Dept: LAB | Facility: HOSPITAL | Age: 33
End: 2018-03-30
Attending: OBSTETRICS & GYNECOLOGY
Payer: COMMERCIAL

## 2018-03-30 PROCEDURE — 87086 URINE CULTURE/COLONY COUNT: CPT

## 2018-04-02 ENCOUNTER — TELEPHONE (OUTPATIENT)
Dept: OBGYN CLINIC | Facility: CLINIC | Age: 33
End: 2018-04-02

## 2018-04-02 ENCOUNTER — HOSPITAL ENCOUNTER (OUTPATIENT)
Dept: ULTRASOUND IMAGING | Facility: HOSPITAL | Age: 33
Discharge: HOME | End: 2018-04-02
Attending: OBSTETRICS & GYNECOLOGY
Payer: COMMERCIAL

## 2018-04-02 ENCOUNTER — INITIAL PRENATAL (OUTPATIENT)
Dept: OBGYN CLINIC | Facility: CLINIC | Age: 33
End: 2018-04-02

## 2018-04-02 VITALS
SYSTOLIC BLOOD PRESSURE: 155 MMHG | HEART RATE: 98 BPM | WEIGHT: 149 LBS | DIASTOLIC BLOOD PRESSURE: 102 MMHG | BODY MASS INDEX: 26 KG/M2

## 2018-04-02 DIAGNOSIS — Z34.91 ENCOUNTER FOR SUPERVISION OF NORMAL PREGNANCY IN FIRST TRIMESTER, UNSPECIFIED GRAVIDITY: Primary | ICD-10-CM

## 2018-04-02 DIAGNOSIS — O26.841 SIGNIFICANT DISCREPANCY BETWEEN UTERINE SIZE AND CLINICAL DATES, ANTEPARTUM, FIRST TRIMESTER: ICD-10-CM

## 2018-04-02 PROCEDURE — 76801 OB US < 14 WKS SINGLE FETUS: CPT | Performed by: OBSTETRICS & GYNECOLOGY

## 2018-04-02 PROCEDURE — 81002 URINALYSIS NONAUTO W/O SCOPE: CPT | Performed by: OBSTETRICS & GYNECOLOGY

## 2018-04-02 PROCEDURE — 76817 TRANSVAGINAL US OBSTETRIC: CPT | Performed by: OBSTETRICS & GYNECOLOGY

## 2018-04-02 RX ORDER — FEXOFENADINE HCL 180 MG/1
180 TABLET ORAL
COMMUNITY
End: 2018-04-02

## 2018-04-02 NOTE — TELEPHONE ENCOUNTER
Received call from [address] [place] [address] at radiology with STAT US results. Phone call was transferred to [facility] in office.

## 2018-04-02 NOTE — TELEPHONE ENCOUNTER
ROSHNI called and stated that pts US from today showed a fetal demise. ROSHNI reviewed options with pt and pt stated she would like to come in for cytotec placement.  ROSHNI stated he would like to add pt on to his schedule for tomorrow AM at 8am at the [facility] for cyto

## 2018-04-03 ENCOUNTER — OFFICE VISIT (OUTPATIENT)
Dept: OBGYN CLINIC | Facility: CLINIC | Age: 33
End: 2018-04-03

## 2018-04-03 VITALS — SYSTOLIC BLOOD PRESSURE: 148 MMHG | DIASTOLIC BLOOD PRESSURE: 86 MMHG | HEART RATE: 82 BPM

## 2018-04-03 DIAGNOSIS — O02.1 MISSED ABORTION: Primary | ICD-10-CM

## 2018-04-03 PROCEDURE — 59200 INSERT CERVICAL DILATOR: CPT | Performed by: OBSTETRICS & GYNECOLOGY

## 2018-04-03 RX ORDER — MISOPROSTOL 200 UG/1
800 TABLET ORAL ONCE
Status: DISCONTINUED | OUTPATIENT
Start: 2018-04-03 | End: 2019-01-13

## 2018-04-03 NOTE — PROGRESS NOTES
HPI:    Patient ID: Blanca Goddard is a 28year old female. HPI   S/P US confirming 6+ week IUFD. We had discussed options of expectant, Cytotec as well as D and C. I did not recommend genetics. After discussion , we scheduled Cytotec today.  No b

## 2018-04-03 NOTE — PROGRESS NOTES
Chad at visit. They decline genetics. Office US shows gestational sac w/o definitive fetal pole but overlying bowel is shadowing. We discussed and scheduled immediate US.

## 2018-04-03 NOTE — PATIENT INSTRUCTIONS
· Please call with any questions or concerns. · Please call with heavy vaginal bleeding that is saturating more than a pad an hour for two hours or if you have lightheadedness/dizzines, shortness or breath or chest pain.    · Please call with severe rhett

## 2018-04-05 ENCOUNTER — TELEPHONE (OUTPATIENT)
Dept: OBGYN CLINIC | Facility: CLINIC | Age: 33
End: 2018-04-05

## 2018-04-05 NOTE — TELEPHONE ENCOUNTER
Steven with Dr Regis Whiting office states that the pt has an appt next Tuesday, and they are requesting the pt's most recent ultrasound results. Please fax those results to [phone]. Please advise.

## 2018-04-12 ENCOUNTER — OFFICE VISIT (OUTPATIENT)
Dept: INTERNAL MEDICINE CLINIC | Facility: CLINIC | Age: 33
End: 2018-04-12

## 2018-04-12 ENCOUNTER — APPOINTMENT (OUTPATIENT)
Dept: LAB | Age: 33
End: 2018-04-12
Attending: OBSTETRICS & GYNECOLOGY
Payer: COMMERCIAL

## 2018-04-12 VITALS
DIASTOLIC BLOOD PRESSURE: 84 MMHG | WEIGHT: 149 LBS | SYSTOLIC BLOOD PRESSURE: 136 MMHG | HEIGHT: 64 IN | BODY MASS INDEX: 25.44 KG/M2 | HEART RATE: 72 BPM

## 2018-04-12 DIAGNOSIS — D39.11 OVARIAN TUMOR OF BORDERLINE MALIGNANCY, RIGHT: ICD-10-CM

## 2018-04-12 DIAGNOSIS — O02.1 MISSED ABORTION: ICD-10-CM

## 2018-04-12 DIAGNOSIS — R03.0 ELEVATED BLOOD PRESSURE READING: Primary | ICD-10-CM

## 2018-04-12 DIAGNOSIS — C56.1 OVARIAN CANCER ON RIGHT (HCC): ICD-10-CM

## 2018-04-12 PROCEDURE — 84702 CHORIONIC GONADOTROPIN TEST: CPT

## 2018-04-12 PROCEDURE — 86304 IMMUNOASSAY TUMOR CA 125: CPT

## 2018-04-12 PROCEDURE — 36415 COLL VENOUS BLD VENIPUNCTURE: CPT

## 2018-04-12 NOTE — PROGRESS NOTES
Francisca Dillard is a 28year old female. Patient presents with:  Blood Pressure: Patient is here today for a blood pressure check. B/P's have been 120's/90's. Patient denies any symptoms when b/p is elevated. HPI:     's-120's/80's-90's at home. Med onc and surgical onc at [facility], as well as Dr. Oskar Pabon did not feel she needs prophylactic left SO, so she is favoring that approach. The patient indicates understanding of these issues and agrees to the plan.

## 2018-04-18 ENCOUNTER — TELEPHONE (OUTPATIENT)
Dept: OBGYN CLINIC | Facility: CLINIC | Age: 33
End: 2018-04-18

## 2018-04-18 PROBLEM — O02.1 MISSED ABORTION: Status: RESOLVED | Noted: 2018-04-03 | Resolved: 2018-04-02

## 2018-04-18 PROBLEM — O02.1 MISSED ABORTION (HCC): Status: RESOLVED | Noted: 2018-04-03 | Resolved: 2018-04-02

## 2018-04-28 ENCOUNTER — PATIENT MESSAGE (OUTPATIENT)
Dept: OBGYN CLINIC | Facility: CLINIC | Age: 33
End: 2018-04-28

## 2018-04-28 DIAGNOSIS — O02.1 MISSED ABORTION: Primary | ICD-10-CM

## 2018-04-30 NOTE — TELEPHONE ENCOUNTER
From: Get Hernandez  To: Scott Naidu DO  Sent: 4/28/2018 8:15 PM CDT  Subject: Test Results Question    Dr. Ant Quintana had recommended that I check HCGs until they are 0. Do I need to repeat the test? There was no order placed.      Thanks,  Maureen Mayfield

## 2018-05-08 ENCOUNTER — APPOINTMENT (OUTPATIENT)
Dept: LAB | Age: 33
End: 2018-05-08
Attending: OBSTETRICS & GYNECOLOGY
Payer: COMMERCIAL

## 2018-05-08 DIAGNOSIS — O02.1 MISSED ABORTION: ICD-10-CM

## 2018-05-08 PROCEDURE — 84702 CHORIONIC GONADOTROPIN TEST: CPT

## 2018-05-08 PROCEDURE — 36415 COLL VENOUS BLD VENIPUNCTURE: CPT

## 2018-05-15 ENCOUNTER — APPOINTMENT (OUTPATIENT)
Dept: LAB | Facility: HOSPITAL | Age: 33
End: 2018-05-15
Attending: OBSTETRICS & GYNECOLOGY
Payer: COMMERCIAL

## 2018-05-15 PROCEDURE — 84702 CHORIONIC GONADOTROPIN TEST: CPT

## 2018-07-03 ENCOUNTER — TELEPHONE (OUTPATIENT)
Dept: INTERNAL MEDICINE CLINIC | Facility: CLINIC | Age: 33
End: 2018-07-03

## 2018-07-03 ENCOUNTER — APPOINTMENT (OUTPATIENT)
Dept: LAB | Age: 33
End: 2018-07-03
Attending: INTERNAL MEDICINE
Payer: COMMERCIAL

## 2018-07-03 DIAGNOSIS — N30.00 ACUTE CYSTITIS WITHOUT HEMATURIA: ICD-10-CM

## 2018-07-03 DIAGNOSIS — N30.00 ACUTE CYSTITIS WITHOUT HEMATURIA: Primary | ICD-10-CM

## 2018-07-03 PROCEDURE — 87186 SC STD MICRODIL/AGAR DIL: CPT | Performed by: INTERNAL MEDICINE

## 2018-07-03 PROCEDURE — 81001 URINALYSIS AUTO W/SCOPE: CPT | Performed by: INTERNAL MEDICINE

## 2018-07-03 PROCEDURE — 87077 CULTURE AEROBIC IDENTIFY: CPT | Performed by: INTERNAL MEDICINE

## 2018-07-03 PROCEDURE — 87086 URINE CULTURE/COLONY COUNT: CPT | Performed by: INTERNAL MEDICINE

## 2018-07-03 RX ORDER — CEPHALEXIN 500 MG/1
500 CAPSULE ORAL 4 TIMES DAILY
Qty: 14 CAPSULE | Refills: 0 | Status: SHIPPED | OUTPATIENT
Start: 2018-07-03 | End: 2018-11-27

## 2018-07-31 ENCOUNTER — TELEPHONE (OUTPATIENT)
Dept: OBGYN CLINIC | Facility: CLINIC | Age: 33
End: 2018-07-31

## 2018-07-31 NOTE — TELEPHONE ENCOUNTER
My chart msg sent reminding her to come in for quant level as soon as possible. Also see 5/14 mychart msg.

## 2018-10-01 ENCOUNTER — TELEPHONE (OUTPATIENT)
Dept: INTERNAL MEDICINE CLINIC | Facility: CLINIC | Age: 33
End: 2018-10-01

## 2018-10-01 ENCOUNTER — LAB ENCOUNTER (OUTPATIENT)
Dept: LAB | Age: 33
End: 2018-10-01
Attending: INTERNAL MEDICINE
Payer: COMMERCIAL

## 2018-10-01 DIAGNOSIS — R42 LIGHTHEADED: Primary | ICD-10-CM

## 2018-10-01 DIAGNOSIS — R42 LIGHTHEADED: ICD-10-CM

## 2018-10-01 PROCEDURE — 85025 COMPLETE CBC W/AUTO DIFF WBC: CPT

## 2018-10-01 PROCEDURE — 36415 COLL VENOUS BLD VENIPUNCTURE: CPT

## 2018-10-01 PROCEDURE — 80048 BASIC METABOLIC PNL TOTAL CA: CPT

## 2018-10-01 NOTE — TELEPHONE ENCOUNTER
Lightheaded today. Not vertigo. Periods heavy since March. LMP today-is heavy flow. No NVD, melena or blood in stools. No dypnea or chest sx. No abd pain. Med--vitamin. /86 sitting and 130/70 standing, HR 80, T 98.6, pulse ox 96%.    Lungs, He

## 2018-10-02 NOTE — TELEPHONE ENCOUNTER
Lab 10/1/18-[facility]-nl. CBC-Hgb 11.6-stable to past--was 11.8 in 10/2016. To Dr Tati Carlson.

## 2018-11-02 ENCOUNTER — TELEPHONE (OUTPATIENT)
Dept: OBGYN CLINIC | Facility: CLINIC | Age: 33
End: 2018-11-02

## 2018-11-02 DIAGNOSIS — O09.299 PREGNANCY WITH POOR OBSTETRIC HISTORY: Primary | ICD-10-CM

## 2018-11-03 ENCOUNTER — APPOINTMENT (OUTPATIENT)
Dept: LAB | Age: 33
End: 2018-11-03
Attending: OBSTETRICS & GYNECOLOGY
Payer: COMMERCIAL

## 2018-11-03 DIAGNOSIS — O09.299 PREGNANCY WITH POOR OBSTETRIC HISTORY: ICD-10-CM

## 2018-11-03 PROCEDURE — 84702 CHORIONIC GONADOTROPIN TEST: CPT

## 2018-11-03 PROCEDURE — 36415 COLL VENOUS BLD VENIPUNCTURE: CPT

## 2018-11-03 PROCEDURE — 84144 ASSAY OF PROGESTERONE: CPT

## 2018-11-03 NOTE — TELEPHONE ENCOUNTER
I spoke with Dr Megha Kitchen re: + HPT. We will order HCG and progesterone followed by repeat HCG in 2 days. Order sent.

## 2018-11-05 ENCOUNTER — TELEPHONE (OUTPATIENT)
Dept: OBGYN CLINIC | Facility: CLINIC | Age: 33
End: 2018-11-05

## 2018-11-05 ENCOUNTER — APPOINTMENT (OUTPATIENT)
Dept: LAB | Facility: HOSPITAL | Age: 33
End: 2018-11-05
Attending: OBSTETRICS & GYNECOLOGY
Payer: COMMERCIAL

## 2018-11-05 DIAGNOSIS — O09.299 PREGNANCY WITH POOR OBSTETRIC HISTORY: ICD-10-CM

## 2018-11-05 DIAGNOSIS — O09.299 PREGNANCY WITH POOR OBSTETRIC HISTORY: Primary | ICD-10-CM

## 2018-11-05 PROCEDURE — 84702 CHORIONIC GONADOTROPIN TEST: CPT

## 2018-11-05 PROCEDURE — 36415 COLL VENOUS BLD VENIPUNCTURE: CPT

## 2018-11-06 NOTE — TELEPHONE ENCOUNTER
V.O from [facility]NI for first trimester OB and use code \"pregnancy with poor obstetric hx\" States pt is aware when needs to go. Order placed.

## 2018-11-17 ENCOUNTER — HOSPITAL ENCOUNTER (OUTPATIENT)
Dept: ULTRASOUND IMAGING | Age: 33
Discharge: HOME | End: 2018-11-17
Attending: OBSTETRICS & GYNECOLOGY
Payer: COMMERCIAL

## 2018-11-17 DIAGNOSIS — O09.299 PREGNANCY WITH POOR OBSTETRIC HISTORY: ICD-10-CM

## 2018-11-17 PROCEDURE — 76801 OB US < 14 WKS SINGLE FETUS: CPT | Performed by: OBSTETRICS & GYNECOLOGY

## 2018-11-17 PROCEDURE — 76817 TRANSVAGINAL US OBSTETRIC: CPT | Performed by: OBSTETRICS & GYNECOLOGY

## 2018-11-19 ENCOUNTER — TELEPHONE (OUTPATIENT)
Dept: OBGYN CLINIC | Facility: CLINIC | Age: 33
End: 2018-11-19

## 2018-11-19 NOTE — TELEPHONE ENCOUNTER
Pt had a home positive. Pt did a quant on 11-5-18 and had an OB US on 11-17-18. Pt looking to schedule her OBN PC. Pt agrees to see all the MDs. Pt will call if any problems. Gave pt the appt time for the OBN PC.       Sent to MD on Call, Yris Clancy, to christiano

## 2018-11-20 NOTE — TELEPHONE ENCOUNTER
I reviewed US and spoke with Dr Rose Gentile. Can you add her on my schedule Tuesday 11-27 at 0920 for 20 minutes? She is aware. OK that we won't have an episode generated  yet. She has RN phone call NPN on 12-03.

## 2018-11-27 ENCOUNTER — OFFICE VISIT (OUTPATIENT)
Dept: OBGYN CLINIC | Facility: CLINIC | Age: 33
End: 2018-11-27
Payer: COMMERCIAL

## 2018-11-27 VITALS
WEIGHT: 149 LBS | SYSTOLIC BLOOD PRESSURE: 136 MMHG | DIASTOLIC BLOOD PRESSURE: 92 MMHG | BODY MASS INDEX: 26 KG/M2 | HEART RATE: 84 BPM

## 2018-11-27 DIAGNOSIS — O20.0 THREATENED ABORTION, ANTEPARTUM: Primary | ICD-10-CM

## 2018-11-27 PROCEDURE — 99213 OFFICE O/P EST LOW 20 MIN: CPT | Performed by: OBSTETRICS & GYNECOLOGY

## 2018-12-02 PROBLEM — O20.0 THREATENED ABORTION, ANTEPARTUM (HCC): Status: ACTIVE | Noted: 2018-12-02

## 2018-12-02 PROBLEM — O20.0 THREATENED ABORTION, ANTEPARTUM: Status: ACTIVE | Noted: 2018-12-02

## 2018-12-03 ENCOUNTER — NURSE ONLY (OUTPATIENT)
Dept: OBGYN CLINIC | Facility: CLINIC | Age: 33
End: 2018-12-03
Payer: COMMERCIAL

## 2018-12-03 VITALS — WEIGHT: 148 LBS | BODY MASS INDEX: 25.27 KG/M2 | HEIGHT: 64 IN

## 2018-12-03 DIAGNOSIS — Z34.81 ENCOUNTER FOR SUPERVISION OF OTHER NORMAL PREGNANCY IN FIRST TRIMESTER: Primary | ICD-10-CM

## 2018-12-03 PROCEDURE — 99211 OFF/OP EST MAY X REQ PHY/QHP: CPT | Performed by: OBSTETRICS & GYNECOLOGY

## 2018-12-03 NOTE — PROGRESS NOTES
Pt had OBN  PC appt today with no complaints. Normal PN labs ordered. Pt advised all labs must be completed and resulted prior to MD appt. Pt is already scheduled for NEW OB appt with ROSHNI on 12/14.      Partner's name is Brayan Yocharisma  contact # [phone] No    Tattoos No    Live with someone or Exposed to TB No    Rash or viral illness since LMP  No    Varicella Yes-in childhood     Recent Travel (or planned travel) to [facility] area for self and or partner No    Pets No        GENETICS SCREENING    Patient gre

## 2018-12-03 NOTE — PROGRESS NOTES
HPI:    Patient ID: Gwen Chaney is a 28year old female. HPI   with sure LMP and early US = dates on . Here today c/o spotting 3 days ago which resolved. No pain. Rh positive. Chad at visit.      Review of Systems   Constitutional: Ne

## 2018-12-07 ENCOUNTER — LAB ENCOUNTER (OUTPATIENT)
Dept: LAB | Age: 33
End: 2018-12-07
Attending: OBSTETRICS & GYNECOLOGY
Payer: COMMERCIAL

## 2018-12-07 DIAGNOSIS — Z34.81 ENCOUNTER FOR SUPERVISION OF OTHER NORMAL PREGNANCY IN FIRST TRIMESTER: ICD-10-CM

## 2018-12-07 PROCEDURE — 87086 URINE CULTURE/COLONY COUNT: CPT

## 2018-12-07 PROCEDURE — 86901 BLOOD TYPING SEROLOGIC RH(D): CPT

## 2018-12-07 PROCEDURE — 85025 COMPLETE CBC W/AUTO DIFF WBC: CPT

## 2018-12-07 PROCEDURE — 86762 RUBELLA ANTIBODY: CPT

## 2018-12-07 PROCEDURE — 36415 COLL VENOUS BLD VENIPUNCTURE: CPT

## 2018-12-07 PROCEDURE — 87389 HIV-1 AG W/HIV-1&-2 AB AG IA: CPT

## 2018-12-07 PROCEDURE — 86900 BLOOD TYPING SEROLOGIC ABO: CPT

## 2018-12-07 PROCEDURE — 86850 RBC ANTIBODY SCREEN: CPT

## 2018-12-07 PROCEDURE — 87340 HEPATITIS B SURFACE AG IA: CPT

## 2018-12-07 PROCEDURE — 86780 TREPONEMA PALLIDUM: CPT

## 2018-12-14 ENCOUNTER — ROUTINE PRENATAL (OUTPATIENT)
Dept: OBGYN CLINIC | Facility: CLINIC | Age: 33
End: 2018-12-14
Payer: COMMERCIAL

## 2018-12-14 VITALS
SYSTOLIC BLOOD PRESSURE: 141 MMHG | DIASTOLIC BLOOD PRESSURE: 93 MMHG | WEIGHT: 147 LBS | HEART RATE: 92 BPM | BODY MASS INDEX: 25 KG/M2

## 2018-12-14 DIAGNOSIS — Z34.91 ENCOUNTER FOR SUPERVISION OF NORMAL PREGNANCY IN FIRST TRIMESTER, UNSPECIFIED GRAVIDITY: Primary | ICD-10-CM

## 2018-12-14 PROCEDURE — 99212 OFFICE O/P EST SF 10 MIN: CPT | Performed by: OBSTETRICS & GYNECOLOGY

## 2018-12-14 PROCEDURE — 81002 URINALYSIS NONAUTO W/O SCOPE: CPT | Performed by: OBSTETRICS & GYNECOLOGY

## 2018-12-26 PROBLEM — O09.529 ANTEPARTUM MULTIGRAVIDA OF ADVANCED MATERNAL AGE: Status: ACTIVE | Noted: 2018-12-26

## 2018-12-26 PROBLEM — O09.529 ANTEPARTUM MULTIGRAVIDA OF ADVANCED MATERNAL AGE (HCC): Status: ACTIVE | Noted: 2018-12-26

## 2018-12-26 NOTE — PROGRESS NOTES
Chad at visit. Discussed BP. She is always anxious in office. She will start daily diary and bring cuff / diary to next visit.

## 2019-01-11 ENCOUNTER — NURSE ONLY (OUTPATIENT)
Dept: INTERNAL MEDICINE CLINIC | Facility: CLINIC | Age: 34
End: 2019-01-11
Payer: COMMERCIAL

## 2019-01-11 DIAGNOSIS — Z3A.15 15 WEEKS GESTATION OF PREGNANCY: Primary | ICD-10-CM

## 2019-01-11 LAB — GLUCOSE 1H P 50 G GLC PO SERPL-MCNC: 89 MG/DL

## 2019-01-13 ENCOUNTER — ANESTHESIA (OUTPATIENT)
Dept: SURGERY | Facility: HOSPITAL | Age: 34
End: 2019-01-13
Payer: COMMERCIAL

## 2019-01-13 ENCOUNTER — ANESTHESIA EVENT (OUTPATIENT)
Dept: SURGERY | Facility: HOSPITAL | Age: 34
End: 2019-01-13
Payer: COMMERCIAL

## 2019-01-13 ENCOUNTER — HOSPITAL ENCOUNTER (OUTPATIENT)
Facility: HOSPITAL | Age: 34
Setting detail: OBSERVATION
Discharge: HOME | End: 2019-01-13
Attending: EMERGENCY MEDICINE | Admitting: OBSTETRICS & GYNECOLOGY
Payer: COMMERCIAL

## 2019-01-13 ENCOUNTER — HOSPITAL ENCOUNTER (EMERGENCY)
Facility: HOSPITAL | Age: 34
Discharge: STILL ADMITTED OR ADMINISTRATIVE | End: 2019-01-13
Payer: COMMERCIAL

## 2019-01-13 VITALS
DIASTOLIC BLOOD PRESSURE: 95 MMHG | TEMPERATURE: 99 F | RESPIRATION RATE: 17 BRPM | OXYGEN SATURATION: 99 % | BODY MASS INDEX: 25.61 KG/M2 | HEIGHT: 64 IN | HEART RATE: 107 BPM | WEIGHT: 150 LBS | SYSTOLIC BLOOD PRESSURE: 136 MMHG

## 2019-01-13 DIAGNOSIS — O03.4 INCOMPLETE ABORTION: ICD-10-CM

## 2019-01-13 DIAGNOSIS — O03.9 SPONTANEOUS ABORTION: Primary | ICD-10-CM

## 2019-01-13 PROBLEM — Q52.129 LONGITUDINAL VAGINAL SEPTUM: Status: ACTIVE | Noted: 2019-01-13

## 2019-01-13 PROBLEM — Z87.718 HISTORY OF UTERINE ANOMALY: Status: ACTIVE | Noted: 2019-01-13

## 2019-01-13 PROBLEM — R73.9 HYPERGLYCEMIA: Status: ACTIVE | Noted: 2019-01-13

## 2019-01-13 LAB
ANION GAP SERPL CALC-SCNC: 10 MMOL/L (ref 0–18)
ANTIBODY SCREEN: NEGATIVE
B-HCG SERPL-ACNC: 5697 MIU/ML
BASOPHILS # BLD: 0.1 K/UL (ref 0–0.2)
BASOPHILS NFR BLD: 1 %
BUN SERPL-MCNC: 11 MG/DL (ref 8–20)
BUN/CREAT SERPL: 15.7 (ref 10–20)
CALCIUM SERPL-MCNC: 9.5 MG/DL (ref 8.5–10.5)
CHLORIDE SERPL-SCNC: 103 MMOL/L (ref 95–110)
CO2 SERPL-SCNC: 23 MMOL/L (ref 22–32)
CREAT SERPL-MCNC: 0.7 MG/DL (ref 0.5–1.5)
EOSINOPHIL # BLD: 0.3 K/UL (ref 0–0.7)
EOSINOPHIL NFR BLD: 2 %
ERYTHROCYTE [DISTWIDTH] IN BLOOD BY AUTOMATED COUNT: 14.8 % (ref 11–15)
GLUCOSE SERPL-MCNC: 113 MG/DL (ref 70–99)
HCT VFR BLD AUTO: 39.9 % (ref 35–48)
HGB BLD-MCNC: 13 G/DL (ref 12–16)
LYMPHOCYTES # BLD: 4.5 K/UL (ref 1–4)
LYMPHOCYTES NFR BLD: 38 %
MCH RBC QN AUTO: 26.4 PG (ref 27–32)
MCHC RBC AUTO-ENTMCNC: 32.5 G/DL (ref 32–37)
MCV RBC AUTO: 81.2 FL (ref 80–100)
MONOCYTES # BLD: 1 K/UL (ref 0–1)
MONOCYTES NFR BLD: 9 %
NEUTROPHILS # BLD AUTO: 6 K/UL (ref 1.8–7.7)
NEUTROPHILS NFR BLD: 51 %
OSMOLALITY UR CALC.SUM OF ELEC: 282 MOSM/KG (ref 275–295)
PLATELET # BLD AUTO: 320 K/UL (ref 140–400)
PMV BLD AUTO: 8.2 FL (ref 7.4–10.3)
POTASSIUM SERPL-SCNC: 4.2 MMOL/L (ref 3.3–5.1)
RBC # BLD AUTO: 4.91 M/UL (ref 3.7–5.4)
RH BLOOD TYPE: POSITIVE
SODIUM SERPL-SCNC: 136 MMOL/L (ref 136–144)
WBC # BLD AUTO: 11.8 K/UL (ref 4–11)

## 2019-01-13 PROCEDURE — 59812 TREATMENT OF MISCARRIAGE: CPT | Performed by: OBSTETRICS & GYNECOLOGY

## 2019-01-13 PROCEDURE — 10D17ZZ EXTRACTION OF PRODUCTS OF CONCEPTION, RETAINED, VIA NATURAL OR ARTIFICIAL OPENING: ICD-10-PCS | Performed by: OBSTETRICS & GYNECOLOGY

## 2019-01-13 RX ORDER — MORPHINE SULFATE 10 MG/ML
6 INJECTION, SOLUTION INTRAMUSCULAR; INTRAVENOUS EVERY 10 MIN PRN
Status: DISCONTINUED | OUTPATIENT
Start: 2019-01-13 | End: 2019-01-13 | Stop reason: HOSPADM

## 2019-01-13 RX ORDER — DOXYCYCLINE HYCLATE 100 MG/1
100 CAPSULE ORAL 2 TIMES DAILY
Qty: 14 CAPSULE | Refills: 0 | Status: SHIPPED | OUTPATIENT
Start: 2019-01-13 | End: 2019-01-20

## 2019-01-13 RX ORDER — LIDOCAINE HYDROCHLORIDE 10 MG/ML
INJECTION, SOLUTION EPIDURAL; INFILTRATION; INTRACAUDAL; PERINEURAL AS NEEDED
Status: DISCONTINUED | OUTPATIENT
Start: 2019-01-13 | End: 2019-01-13 | Stop reason: SURG

## 2019-01-13 RX ORDER — ONDANSETRON 2 MG/ML
INJECTION INTRAMUSCULAR; INTRAVENOUS AS NEEDED
Status: DISCONTINUED | OUTPATIENT
Start: 2019-01-13 | End: 2019-01-13 | Stop reason: SURG

## 2019-01-13 RX ORDER — ONDANSETRON 2 MG/ML
4 INJECTION INTRAMUSCULAR; INTRAVENOUS ONCE AS NEEDED
Status: DISCONTINUED | OUTPATIENT
Start: 2019-01-13 | End: 2019-01-13 | Stop reason: HOSPADM

## 2019-01-13 RX ORDER — SODIUM CHLORIDE, SODIUM LACTATE, POTASSIUM CHLORIDE, CALCIUM CHLORIDE 600; 310; 30; 20 MG/100ML; MG/100ML; MG/100ML; MG/100ML
INJECTION, SOLUTION INTRAVENOUS CONTINUOUS
Status: DISCONTINUED | OUTPATIENT
Start: 2019-01-13 | End: 2019-01-13

## 2019-01-13 RX ORDER — ONDANSETRON 4 MG/1
4 TABLET, FILM COATED ORAL EVERY 8 HOURS PRN
Status: CANCELLED | OUTPATIENT
Start: 2019-01-13

## 2019-01-13 RX ORDER — HYDROCODONE BITARTRATE AND ACETAMINOPHEN 5; 325 MG/1; MG/1
1 TABLET ORAL AS NEEDED
Status: DISCONTINUED | OUTPATIENT
Start: 2019-01-13 | End: 2019-01-13 | Stop reason: HOSPADM

## 2019-01-13 RX ORDER — DEXAMETHASONE SODIUM PHOSPHATE 4 MG/ML
VIAL (ML) INJECTION AS NEEDED
Status: DISCONTINUED | OUTPATIENT
Start: 2019-01-13 | End: 2019-01-13 | Stop reason: SURG

## 2019-01-13 RX ORDER — MORPHINE SULFATE 4 MG/ML
4 INJECTION, SOLUTION INTRAMUSCULAR; INTRAVENOUS EVERY 10 MIN PRN
Status: DISCONTINUED | OUTPATIENT
Start: 2019-01-13 | End: 2019-01-13 | Stop reason: HOSPADM

## 2019-01-13 RX ORDER — IBUPROFEN 600 MG/1
600 TABLET ORAL EVERY 6 HOURS
Status: CANCELLED | OUTPATIENT
Start: 2019-01-13

## 2019-01-13 RX ORDER — MORPHINE SULFATE 4 MG/ML
2 INJECTION, SOLUTION INTRAMUSCULAR; INTRAVENOUS ONCE
Status: COMPLETED | OUTPATIENT
Start: 2019-01-13 | End: 2019-01-13

## 2019-01-13 RX ORDER — HYDROCODONE BITARTRATE AND ACETAMINOPHEN 5; 325 MG/1; MG/1
2 TABLET ORAL AS NEEDED
Status: DISCONTINUED | OUTPATIENT
Start: 2019-01-13 | End: 2019-01-13 | Stop reason: HOSPADM

## 2019-01-13 RX ORDER — SODIUM CHLORIDE, SODIUM LACTATE, POTASSIUM CHLORIDE, CALCIUM CHLORIDE 600; 310; 30; 20 MG/100ML; MG/100ML; MG/100ML; MG/100ML
INJECTION, SOLUTION INTRAVENOUS CONTINUOUS
Status: DISCONTINUED | OUTPATIENT
Start: 2019-01-13 | End: 2019-01-13 | Stop reason: HOSPADM

## 2019-01-13 RX ORDER — MORPHINE SULFATE 4 MG/ML
2 INJECTION, SOLUTION INTRAMUSCULAR; INTRAVENOUS EVERY 10 MIN PRN
Status: DISCONTINUED | OUTPATIENT
Start: 2019-01-13 | End: 2019-01-13 | Stop reason: HOSPADM

## 2019-01-13 RX ORDER — HALOPERIDOL 5 MG/ML
0.25 INJECTION INTRAMUSCULAR ONCE AS NEEDED
Status: DISCONTINUED | OUTPATIENT
Start: 2019-01-13 | End: 2019-01-13 | Stop reason: HOSPADM

## 2019-01-13 RX ORDER — NALOXONE HYDROCHLORIDE 0.4 MG/ML
80 INJECTION, SOLUTION INTRAMUSCULAR; INTRAVENOUS; SUBCUTANEOUS AS NEEDED
Status: DISCONTINUED | OUTPATIENT
Start: 2019-01-13 | End: 2019-01-13 | Stop reason: HOSPADM

## 2019-01-13 RX ORDER — ONDANSETRON 2 MG/ML
4 INJECTION INTRAMUSCULAR; INTRAVENOUS EVERY 8 HOURS PRN
Status: CANCELLED | OUTPATIENT
Start: 2019-01-13

## 2019-01-13 RX ADMIN — ONDANSETRON 4 MG: 2 INJECTION INTRAMUSCULAR; INTRAVENOUS at 09:25:00

## 2019-01-13 RX ADMIN — DEXAMETHASONE SODIUM PHOSPHATE 4 MG: 4 MG/ML VIAL (ML) INJECTION at 09:25:00

## 2019-01-13 RX ADMIN — LIDOCAINE HYDROCHLORIDE 25 MG: 10 INJECTION, SOLUTION EPIDURAL; INFILTRATION; INTRACAUDAL; PERINEURAL at 09:25:00

## 2019-01-13 RX ADMIN — SODIUM CHLORIDE, SODIUM LACTATE, POTASSIUM CHLORIDE, CALCIUM CHLORIDE: 600; 310; 30; 20 INJECTION, SOLUTION INTRAVENOUS at 09:25:00

## 2019-01-13 NOTE — DISCHARGE SUMMARY
[facility]    Discharge Summary    Aman Gabriel Patient Status:  Observation    1985 MRN [id number]   Location [address] UNIT Attending Rajinder Christianson MD   Hosp Day # 0 PCP Joanie Dudley MD

## 2019-01-13 NOTE — PROGRESS NOTES
Pt up to BR with assist x 1. Ambulates with steady gait. Voided 500 ml and had BM. Tolerating PO liquids without nausea.

## 2019-01-13 NOTE — H&P
[address] Belcher Patient Status:  Observation    1985 MRN [id number]   Location [address] Attending Brandy Salazar MD   Hosp Day # 0 PCP MD MORALES Avendaño OOPHORECTOMY N/A 8/18/2017    Performed by Magaly Eden DO at [facility] N/A 8/18/2017    Performed by Magaly Eden DO at [facility] OR   • OOPHORECTOMY  08/2017    right ovary   • OTHER SURGICAL HISTORY      la 10 0 - 18 mmol/L    Calculated Osmolality 282 275 - 295 mOsm/kg    GFR, Non- >60 >=60    GFR, -American >60 >=60   HCG, BETA SUBUNIT (QUANT PREGNANCY TEST)    Collection Time: 01/13/19  2:44 AM   Result Value Ref Range    Hcg Quantit pelvis:  Normal uterus with normal endometrial signal, junctional zone and myometrium. 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 Assessment/Plan:   No problems updated.     Problem   Incomplete     Plan:

## 2019-01-13 NOTE — PROGRESS NOTES
Pt dc'd home stable condition w/. Via wheel chair by CST. AVS given with discharge instructions,f/u and abx. Q&A done.  Pt agrees and comfortable w/discharge POC

## 2019-01-13 NOTE — OPERATIVE REPORT
[address] [address], [facility] OPERATING ROOM  Operative Note     Mariana Locus Location: OR   [facility] [id number] MRN [id number]   Admission Date 1/13/2019 Operation Date 1/13/2019   Attending Physician Giles Fleming MD Operati sent for chromosome studies. The patient tolerated the procedure well. The patient was taken to recovery room in stable condition. Porsha Mark MD  1/13/2019  10:10 AM

## 2019-01-13 NOTE — ED PROVIDER NOTES
Patient Seen in: [facility] AND [facility] Emergency Department    History   Patient presents with:  Abdominal Pain    Stated Complaint: pregnant, bleeding, pain    HPI     28 yo female is 15 weeks pregnant and tonight started bleeding with intermittent severe a well-nourished. No distress. HENT:   Head: Normocephalic and atraumatic. Eyes: Conjunctivae are normal.   Neck: Normal range of motion. Neck supple. Cardiovascular: Normal rate, normal heart sounds and intact distal pulses.    Pulmonary/Chest: Effort DRAW LIGHT GREEN   RAINBOW DRAW GOLD   RAINBOW DRAW LAVENDER TALL (BNP)                MDM     Admission disposition: 1/13/2019  3:11 AM         D/w Dr Nate Hope, will send upstairs to post partum.      Disposition and Plan     Clinical Impression:  Spontaneous

## 2019-01-13 NOTE — ANESTHESIA PROCEDURE NOTES
ANESTHESIA INTUBATION  Date/Time: 1/13/2019 9:27 AM  Urgency: emergent      General Information and Staff    Patient location during procedure: OR  Anesthesiologist: Azalea Holloway MD  Performed: anesthesiologist     Indications and Patient Condition  [place]

## 2019-01-13 NOTE — ANESTHESIA POSTPROCEDURE EVALUATION
Patient: Ami Holts    Procedure Summary     Date:  19 Room / Location:  [facility] MAIN OR [address] [place] [address] MAIN OR    Anesthesia Start:  3107 Anesthesia Stop:  4881    Procedure:  DILATION & CURETTAGE SUCTION (N/A ) Diagnosis:       Incomplete

## 2019-01-13 NOTE — PROGRESS NOTES
Transfer note, pt to OR for D&C at this time, stable condition. Iv intact/infusing, no r/s. Consents obtained for D&C and disposition of fetus. Q&A done. abx order for OR ordered by MD. Report to OR RN given.  V/s stable , afebrile

## 2019-01-13 NOTE — PROGRESS NOTES
Pt is a 27year old female admitted to 371/371-A. Patient presents with:  Abdominal Pain     Pt is  15w0d intra-uterine pregnancy. Pt transferred via wheelchair from ER. History obtained. Oriented to room, staff, and plan of care.

## 2019-01-13 NOTE — ANESTHESIA PREPROCEDURE EVALUATION
Anesthesia PreOp Note    HPI:     Nicholas Peña is a 27year old female who presents for preoperative consultation requested by: Manny Love MD    Date of Surgery: 1/13/2019    Procedure(s):  DILATION & CURETTAGE SUCTION  Indication: Incomplete abo Take by mouth. Disp:  Rfl:  Taking       No current Epic-ordered facility-administered medications on file. No current [facility]-ordered outpatient medications on file.     No Known Allergies    Family History   Problem Relation Age of Onset   • High Blood Pre 113 (H) 01/13/2019    CA 9.5 01/13/2019          Vital Signs: Body mass index is 25.75 kg/m². height is 1.626 m (5' 4\") and weight is 68 kg (150 lb). Her temperature is 98.2 °F (36.8 °C). Her blood pressure is 130/89 and her pulse is 87.  Her respiratio

## 2019-01-14 LAB
APTT PPP: 24.5 SECONDS (ref 23.2–35.3)
CONFIRM APTT STACLOT: NEGATIVE
CONFIRM DRVVT: 1 S (ref 0–1.1)
NUCLEAR IGG TITR SER IF: NEGATIVE {TITER}
PROTHROMBIN TIME: 12.8 SECONDS (ref 11.8–14.5)

## 2019-01-15 LAB
BETA 2 GLYCOPROTEIN 1 AB, IGG: <3.7 U/ML (ref ?–15)
BETA 2 GLYCOPROTEIN 1 AB, IGM: <3.7 U/ML (ref ?–15)

## 2019-01-16 LAB — BETA-2 MICROGLOBULIN, SERUM OR PLASMA: 1.4 MG/L

## 2019-01-17 ENCOUNTER — MED REC SCAN ONLY (OUTPATIENT)
Dept: OBGYN CLINIC | Facility: CLINIC | Age: 34
End: 2019-01-17

## 2019-01-17 LAB
CARDIOLIPIN IGG SERPL-ACNC: 1.3 GPL (ref 0–14.9)
CARDIOLIPIN IGM SERPL-ACNC: 6.1 MPL (ref 0–12.4)

## 2019-01-29 ENCOUNTER — TELEPHONE (OUTPATIENT)
Dept: OBGYN UNIT | Facility: HOSPITAL | Age: 34
End: 2019-01-29

## 2019-02-13 ENCOUNTER — TELEPHONE (OUTPATIENT)
Dept: OBGYN CLINIC | Facility: CLINIC | Age: 34
End: 2019-02-13

## 2019-02-20 ENCOUNTER — TELEPHONE (OUTPATIENT)
Dept: OBGYN CLINIC | Facility: CLINIC | Age: 34
End: 2019-02-20

## 2019-06-01 ENCOUNTER — MOBILE ENCOUNTER (OUTPATIENT)
Dept: INTERNAL MEDICINE CLINIC | Facility: CLINIC | Age: 34
End: 2019-06-01

## 2019-06-01 RX ORDER — NORETHINDRONE ACETATE AND ETHINYL ESTRADIOL 1MG-20(21)
1 KIT ORAL DAILY
Qty: 30 TABLET | Refills: 0 | Status: SHIPPED | OUTPATIENT
Start: 2019-06-01 | End: 2019-06-21

## 2019-06-21 ENCOUNTER — MOBILE ENCOUNTER (OUTPATIENT)
Dept: INTERNAL MEDICINE CLINIC | Facility: CLINIC | Age: 34
End: 2019-06-21

## 2019-06-21 RX ORDER — NORETHINDRONE ACETATE AND ETHINYL ESTRADIOL 1MG-20(21)
1 KIT ORAL DAILY
Qty: 3 PACKAGE | Refills: 0 | Status: SHIPPED | OUTPATIENT
Start: 2019-06-21 | End: 2020-04-07

## 2020-03-26 ENCOUNTER — TELEPHONE (OUTPATIENT)
Dept: OBGYN CLINIC | Facility: CLINIC | Age: 35
End: 2020-03-26

## 2020-03-26 ENCOUNTER — APPOINTMENT (OUTPATIENT)
Dept: LAB | Facility: HOSPITAL | Age: 35
End: 2020-03-26
Attending: OBSTETRICS & GYNECOLOGY
Payer: COMMERCIAL

## 2020-03-26 DIAGNOSIS — O20.0 THREATENED ABORTION, ANTEPARTUM: Primary | ICD-10-CM

## 2020-03-26 DIAGNOSIS — O20.0 THREATENED ABORTION, ANTEPARTUM: ICD-10-CM

## 2020-03-26 LAB — B-HCG SERPL-ACNC: ABNORMAL MIU/ML

## 2020-03-26 PROCEDURE — 84702 CHORIONIC GONADOTROPIN TEST: CPT

## 2020-03-26 PROCEDURE — 36415 COLL VENOUS BLD VENIPUNCTURE: CPT

## 2020-03-26 NOTE — TELEPHONE ENCOUNTER
I spoke with Dr Armijo Hearing yesterday and again this AM. She had spotting onset yesterday but then more like menses overnight. We suspect spontaneous AB. We'll check HCG x 2, 2 days apart and decide if US is even necessary. Order sent.

## 2020-03-28 ENCOUNTER — APPOINTMENT (OUTPATIENT)
Dept: LAB | Facility: HOSPITAL | Age: 35
End: 2020-03-28
Attending: OBSTETRICS & GYNECOLOGY
Payer: COMMERCIAL

## 2020-03-28 DIAGNOSIS — O20.0 THREATENED ABORTION, ANTEPARTUM: ICD-10-CM

## 2020-03-28 PROCEDURE — 84702 CHORIONIC GONADOTROPIN TEST: CPT

## 2020-03-28 PROCEDURE — 36415 COLL VENOUS BLD VENIPUNCTURE: CPT

## 2020-03-30 ENCOUNTER — TELEPHONE (OUTPATIENT)
Dept: OBGYN CLINIC | Facility: CLINIC | Age: 35
End: 2020-03-30

## 2020-03-30 ENCOUNTER — HOSPITAL ENCOUNTER (OUTPATIENT)
Dept: ULTRASOUND IMAGING | Facility: HOSPITAL | Age: 35
Discharge: HOME | End: 2020-03-30
Attending: OBSTETRICS & GYNECOLOGY
Payer: COMMERCIAL

## 2020-03-30 DIAGNOSIS — O20.0 THREATENED ABORTION, ANTEPARTUM: ICD-10-CM

## 2020-03-30 DIAGNOSIS — O20.0 THREATENED ABORTION, ANTEPARTUM: Primary | ICD-10-CM

## 2020-03-30 PROCEDURE — 76817 TRANSVAGINAL US OBSTETRIC: CPT | Performed by: OBSTETRICS & GYNECOLOGY

## 2020-03-30 PROCEDURE — 76801 OB US < 14 WKS SINGLE FETUS: CPT | Performed by: OBSTETRICS & GYNECOLOGY

## 2020-03-30 NOTE — TELEPHONE ENCOUNTER
Informed pt that we need to wait for her results for her OB U/S and MD will inform her of her next step. Informed pt that her appt will be canceled today . Pt stated understanding.

## 2020-04-07 ENCOUNTER — INITIAL PRENATAL (OUTPATIENT)
Dept: OBGYN CLINIC | Facility: CLINIC | Age: 35
End: 2020-04-07
Payer: COMMERCIAL

## 2020-04-07 VITALS
HEART RATE: 112 BPM | WEIGHT: 157.38 LBS | DIASTOLIC BLOOD PRESSURE: 98 MMHG | SYSTOLIC BLOOD PRESSURE: 137 MMHG | BODY MASS INDEX: 27 KG/M2

## 2020-04-07 DIAGNOSIS — Z11.3 VENEREAL DISEASE SCREENING: ICD-10-CM

## 2020-04-07 DIAGNOSIS — O16.1 ELEVATED BLOOD PRESSURE AFFECTING PREGNANCY IN FIRST TRIMESTER, ANTEPARTUM: ICD-10-CM

## 2020-04-07 DIAGNOSIS — Z34.01 ENCOUNTER FOR SUPERVISION OF NORMAL FIRST PREGNANCY IN FIRST TRIMESTER: Primary | ICD-10-CM

## 2020-04-07 PROBLEM — O03.4 INCOMPLETE ABORTION: Status: RESOLVED | Noted: 2019-01-13 | Resolved: 2020-04-07

## 2020-04-07 PROBLEM — O26.20 HABITUAL ABORTER, ANTEPARTUM (HCC): Status: ACTIVE | Noted: 2020-04-07

## 2020-04-07 PROBLEM — O03.4 INCOMPLETE ABORTION (HCC): Status: RESOLVED | Noted: 2019-01-13 | Resolved: 2020-04-07

## 2020-04-07 PROBLEM — O26.20 HABITUAL ABORTER, ANTEPARTUM: Status: ACTIVE | Noted: 2020-04-07

## 2020-04-13 ENCOUNTER — PATIENT MESSAGE (OUTPATIENT)
Dept: OBGYN CLINIC | Facility: CLINIC | Age: 35
End: 2020-04-13

## 2020-04-13 NOTE — TELEPHONE ENCOUNTER
From: Peola Canavan  To: Adama Thao. DO Cyndee  Sent: 4/13/2020 1:16 PM CDT  Subject: Other    Due to bleeding, my prenatal course was thrown off and my OB telephone nurse visit was cancelled (we had to verify that I wasn't miscarrying).  I ended up seein

## 2020-04-13 NOTE — TELEPHONE ENCOUNTER
IT APPEARS ROS[facility]NI SAW PT FOR PROBLEM APPT BUT USED NPN TEMPLATE, STILL NEEDS OBN. SPOKE WITH PT AND SCHEDULED OBN PC APPT.

## 2020-04-15 ENCOUNTER — NURSE ONLY (OUTPATIENT)
Dept: OBGYN CLINIC | Facility: CLINIC | Age: 35
End: 2020-04-15
Payer: COMMERCIAL

## 2020-04-15 VITALS — WEIGHT: 157 LBS | BODY MASS INDEX: 27 KG/M2

## 2020-04-15 DIAGNOSIS — Z3A.08 8 WEEKS GESTATION OF PREGNANCY: Primary | ICD-10-CM

## 2020-04-15 NOTE — PROGRESS NOTES
Pt called for OBN PC appt today with no complaints. Pt saw ROSHNI for NPN 4/7 (d/t bleeding). Pt had quants and an OB US 3/30/20 confirming a viable IUP. US showed large [address], bleeding has since resolved. Normal PN labs and 1 hour GTT ordered.   Pt advised all Includes patient, baby's father, or anyone in either family with:  Patient's age 28 years or older as of estimated date of delivery:  Yes   Thalassemia ([facility], [address] [facility], or  background):  MCV less than 80:  No   Neural tube defect (Mening

## 2020-04-21 ENCOUNTER — LAB ENCOUNTER (OUTPATIENT)
Dept: LAB | Facility: HOSPITAL | Age: 35
End: 2020-04-21
Attending: OBSTETRICS & GYNECOLOGY
Payer: COMMERCIAL

## 2020-04-21 ENCOUNTER — ROUTINE PRENATAL (OUTPATIENT)
Dept: OBGYN CLINIC | Facility: CLINIC | Age: 35
End: 2020-04-21
Payer: COMMERCIAL

## 2020-04-21 VITALS
DIASTOLIC BLOOD PRESSURE: 92 MMHG | BODY MASS INDEX: 27 KG/M2 | HEART RATE: 102 BPM | SYSTOLIC BLOOD PRESSURE: 144 MMHG | WEIGHT: 159 LBS

## 2020-04-21 DIAGNOSIS — O20.0 THREATENED ABORTION, ANTEPARTUM: ICD-10-CM

## 2020-04-21 DIAGNOSIS — Z34.91 ENCOUNTER FOR SUPERVISION OF NORMAL PREGNANCY IN FIRST TRIMESTER, UNSPECIFIED GRAVIDITY: Primary | ICD-10-CM

## 2020-04-21 DIAGNOSIS — Z3A.08 8 WEEKS GESTATION OF PREGNANCY: ICD-10-CM

## 2020-04-21 PROCEDURE — 86780 TREPONEMA PALLIDUM: CPT

## 2020-04-21 PROCEDURE — 86762 RUBELLA ANTIBODY: CPT

## 2020-04-21 PROCEDURE — 85025 COMPLETE CBC W/AUTO DIFF WBC: CPT

## 2020-04-21 PROCEDURE — 36415 COLL VENOUS BLD VENIPUNCTURE: CPT

## 2020-04-21 PROCEDURE — 82950 GLUCOSE TEST: CPT

## 2020-04-21 PROCEDURE — 87389 HIV-1 AG W/HIV-1&-2 AB AG IA: CPT

## 2020-04-21 PROCEDURE — 86900 BLOOD TYPING SEROLOGIC ABO: CPT

## 2020-04-21 PROCEDURE — 86901 BLOOD TYPING SEROLOGIC RH(D): CPT

## 2020-04-21 PROCEDURE — 87086 URINE CULTURE/COLONY COUNT: CPT

## 2020-04-21 PROCEDURE — 86850 RBC ANTIBODY SCREEN: CPT

## 2020-04-21 PROCEDURE — 87340 HEPATITIS B SURFACE AG IA: CPT

## 2020-04-21 PROCEDURE — 81002 URINALYSIS NONAUTO W/O SCOPE: CPT | Performed by: OBSTETRICS & GYNECOLOGY

## 2020-04-21 PROCEDURE — 84702 CHORIONIC GONADOTROPIN TEST: CPT

## 2020-04-23 NOTE — PROGRESS NOTES
Called this AM after fall at home on buttocks. No abdominal pain, LOF or bleeding. Home BP's are 110's / 85-90. FHT's seen on US. Reassured. Continue home BP's. Will e-mail if persistent > 140/ 90.

## 2020-05-05 ENCOUNTER — TELEPHONE (OUTPATIENT)
Dept: OBGYN CLINIC | Facility: CLINIC | Age: 35
End: 2020-05-05

## 2020-05-05 ENCOUNTER — ROUTINE PRENATAL (OUTPATIENT)
Dept: OBGYN CLINIC | Facility: CLINIC | Age: 35
End: 2020-05-05
Payer: COMMERCIAL

## 2020-05-05 VITALS
HEART RATE: 96 BPM | SYSTOLIC BLOOD PRESSURE: 133 MMHG | BODY MASS INDEX: 28 KG/M2 | WEIGHT: 161.19 LBS | DIASTOLIC BLOOD PRESSURE: 96 MMHG

## 2020-05-05 DIAGNOSIS — Z34.81 ENCOUNTER FOR SUPERVISION OF OTHER NORMAL PREGNANCY IN FIRST TRIMESTER: Primary | ICD-10-CM

## 2020-05-05 DIAGNOSIS — O09.522 MULTIGRAVIDA OF ADVANCED MATERNAL AGE IN SECOND TRIMESTER: ICD-10-CM

## 2020-05-05 DIAGNOSIS — O09.292 HISTORY OF PREGNANCY LOSS IN PRIOR PREGNANCY, CURRENTLY PREGNANT IN SECOND TRIMESTER: Primary | ICD-10-CM

## 2020-05-05 PROBLEM — Z98.891 STATUS POST HYSTEROSCOPIC RESECTION OF UTERINE SEPTUM: Status: ACTIVE | Noted: 2020-05-05

## 2020-05-05 PROBLEM — O16.1 ELEVATED BLOOD PRESSURE AFFECTING PREGNANCY IN FIRST TRIMESTER, ANTEPARTUM: Status: RESOLVED | Noted: 2020-04-07 | Resolved: 2020-05-05

## 2020-05-05 PROBLEM — R03.0 WHITE COAT SYNDROME WITHOUT DIAGNOSIS OF HYPERTENSION: Status: ACTIVE | Noted: 2020-05-05

## 2020-05-05 PROBLEM — O20.0 THREATENED ABORTION, ANTEPARTUM (HCC): Status: RESOLVED | Noted: 2018-12-02 | Resolved: 2020-05-05

## 2020-05-05 PROBLEM — O20.0 THREATENED ABORTION, ANTEPARTUM: Status: RESOLVED | Noted: 2018-12-02 | Resolved: 2020-05-05

## 2020-05-05 PROBLEM — O16.1 ELEVATED BLOOD PRESSURE AFFECTING PREGNANCY IN FIRST TRIMESTER, ANTEPARTUM (HCC): Status: RESOLVED | Noted: 2020-04-07 | Resolved: 2020-05-05

## 2020-05-05 PROCEDURE — 81002 URINALYSIS NONAUTO W/O SCOPE: CPT | Performed by: OBSTETRICS & GYNECOLOGY

## 2020-05-05 RX ORDER — LORATADINE 10 MG/1
TABLET ORAL
COMMUNITY
Start: 2020-04-30 | End: 2021-12-08 | Stop reason: ALTCHOICE

## 2020-05-06 NOTE — TELEPHONE ENCOUNTER
Needs MFM consult / serial u/s for hx second trimester pregnancy loss -- also wishes for level 2 u/s for [facility]

## 2020-05-06 NOTE — PROGRESS NOTES
Continue BP log & email weekly. Reviewed OB hx in detail & pt w/ hx 15-16 wk loss needing D&C for retained POC -- since then has had resection of uterine septum. Needs to see MFM for this history of 2nd trimester loss. Per pt had VB w/o warning & delivery.

## 2020-05-15 ENCOUNTER — TELEPHONE (OUTPATIENT)
Dept: OBGYN CLINIC | Facility: CLINIC | Age: 35
End: 2020-05-15

## 2020-05-15 NOTE — TELEPHONE ENCOUNTER
Pt is 14w1d, had pn visit on 5/5, next one scheduled 6/2, pt wondering if she can get an appt next week just for reassurance, has history of miscarriages.  Please advise

## 2020-05-15 NOTE — TELEPHONE ENCOUNTER
Assisted pt with scheduling PN appt with [facility] for 5/19/2020 at 910am. Advised pt to come alone and to arrive 5-10 minutes early to allow for screening process due to Covid-19. Pt verbalized understanding.

## 2020-05-15 NOTE — TELEPHONE ENCOUNTER
14w1d Pt last seen on 5/5/2020. Pt requesting appt anytime next week for reassurance. Pt has had 3 SABs with 1 being a 15-16wk loss. 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 Pt denies bleeding, spotting, or pain.  Informed pt me

## 2020-05-19 ENCOUNTER — ROUTINE PRENATAL (OUTPATIENT)
Dept: OBGYN CLINIC | Facility: CLINIC | Age: 35
End: 2020-05-19
Payer: COMMERCIAL

## 2020-05-19 VITALS
SYSTOLIC BLOOD PRESSURE: 126 MMHG | HEART RATE: 98 BPM | DIASTOLIC BLOOD PRESSURE: 89 MMHG | BODY MASS INDEX: 28 KG/M2 | WEIGHT: 164.19 LBS

## 2020-05-19 DIAGNOSIS — Z34.82 ENCOUNTER FOR SUPERVISION OF OTHER NORMAL PREGNANCY IN SECOND TRIMESTER: Primary | ICD-10-CM

## 2020-05-19 PROCEDURE — 3079F DIAST BP 80-89 MM HG: CPT | Performed by: OBSTETRICS & GYNECOLOGY

## 2020-05-19 PROCEDURE — 3074F SYST BP LT 130 MM HG: CPT | Performed by: OBSTETRICS & GYNECOLOGY

## 2020-05-19 PROCEDURE — 81002 URINALYSIS NONAUTO W/O SCOPE: CPT | Performed by: OBSTETRICS & GYNECOLOGY

## 2020-05-20 ENCOUNTER — TELEPHONE (OUTPATIENT)
Dept: OBGYN CLINIC | Facility: CLINIC | Age: 35
End: 2020-05-20

## 2020-05-20 DIAGNOSIS — O26.20 HABITUAL ABORTER, CURRENTLY PREGNANT: Primary | ICD-10-CM

## 2020-05-20 DIAGNOSIS — D39.11 OVARIAN TUMOR OF BORDERLINE MALIGNANCY, RIGHT: ICD-10-CM

## 2020-05-20 DIAGNOSIS — O09.522 ADVANCED MATERNAL AGE IN MULTIGRAVIDA, SECOND TRIMESTER: ICD-10-CM

## 2020-05-20 NOTE — TELEPHONE ENCOUNTER
MFM called and stated that pt was informed to have a Level 2 at 20 weeks. MFM, Gilma Smith, stated that pt is nervous, because she has had three miscarriages previously. MFM stated they talked to Dr. Lex Thurston and rec a cervical length.       Sent to MD at [place], [place]

## 2020-05-20 NOTE — TELEPHONE ENCOUNTER
ORDERS PLACED FOR CONSULT, CERVICAL LENGTH AND A SEPARATE ORDER FOR LEVEL II.  TRIED TO CALL PT 3 TIMES AND CALL WILL NOT GO THROUGH. SENT MY CHART MESSAGE TO PT WITH INFO TO CALL MFM.

## 2020-05-22 NOTE — PROGRESS NOTES
Outpatient Maternal-Fetal Medicine Consultation    Dear Dr. Sanderson Sic,    Thank you for requesting ultrasound evaluation and maternal fetal medicine consultation on your patient Hanna Velez.   As you are aware she is a 38year old female with a [place] surgical history (3/19 & ). Family History  The patient She indicated that her mother is alive. She indicated that her father is alive. She indicated that her maternal grandmother is alive. She indicated that her maternal grandfather is .  Sh not connect to the uterus. We discussed that her miscarriages are most likely related to the uterine septum.   Her cervical ultrasound today is very normal.  The length is 3.9 cm and the ultrasound appearance and curvature of the cervix is quite reassuri obstetric population is 3 percent, rising to 7 to 12 percent in women over age 36 and 21 percent in women over age 48. Women 28years of age or older are more likely to be delivered by .  The  delivery rate in the general obstetric populati time of delivery I reviewed that her risk (at amniocentesis) of having a fetus with any chromosome abnormality is 1:100 and with trisomy 24 is 1: 190.     Invasive Testing  I offered invasive genetic testing (amniocentesis, chorionic villus sampling) after

## 2020-05-29 ENCOUNTER — HOSPITAL ENCOUNTER (OUTPATIENT)
Dept: PERINATAL CARE | Facility: HOSPITAL | Age: 35
Discharge: HOME | End: 2020-05-29
Attending: OBSTETRICS & GYNECOLOGY
Payer: COMMERCIAL

## 2020-05-29 DIAGNOSIS — O26.20 HABITUAL ABORTER, CURRENTLY PREGNANT: ICD-10-CM

## 2020-05-29 DIAGNOSIS — R03.0 WHITE COAT SYNDROME WITHOUT DIAGNOSIS OF HYPERTENSION: ICD-10-CM

## 2020-05-29 DIAGNOSIS — O09.292 PRIOR PERINATAL LOSS IN SECOND TRIMESTER, ANTEPARTUM: Primary | ICD-10-CM

## 2020-05-29 DIAGNOSIS — O09.292 PRIOR PERINATAL LOSS IN SECOND TRIMESTER, ANTEPARTUM: ICD-10-CM

## 2020-05-29 DIAGNOSIS — O09.522 ADVANCED MATERNAL AGE IN MULTIGRAVIDA, SECOND TRIMESTER: ICD-10-CM

## 2020-05-29 PROCEDURE — 99243 OFF/OP CNSLTJ NEW/EST LOW 30: CPT | Performed by: OBSTETRICS & GYNECOLOGY

## 2020-05-29 PROCEDURE — 76815 OB US LIMITED FETUS(S): CPT | Performed by: OBSTETRICS & GYNECOLOGY

## 2020-05-29 PROCEDURE — 76817 TRANSVAGINAL US OBSTETRIC: CPT | Performed by: OBSTETRICS & GYNECOLOGY

## 2020-06-02 ENCOUNTER — ROUTINE PRENATAL (OUTPATIENT)
Dept: OBGYN CLINIC | Facility: CLINIC | Age: 35
End: 2020-06-02
Payer: COMMERCIAL

## 2020-06-02 ENCOUNTER — HOSPITAL ENCOUNTER (OUTPATIENT)
Facility: HOSPITAL | Age: 35
Setting detail: OBSERVATION
Discharge: HOME | End: 2020-06-02
Attending: OBSTETRICS & GYNECOLOGY | Admitting: OBSTETRICS & GYNECOLOGY
Payer: COMMERCIAL

## 2020-06-02 VITALS — DIASTOLIC BLOOD PRESSURE: 101 MMHG | SYSTOLIC BLOOD PRESSURE: 148 MMHG | HEART RATE: 86 BPM

## 2020-06-02 VITALS
DIASTOLIC BLOOD PRESSURE: 116 MMHG | SYSTOLIC BLOOD PRESSURE: 159 MMHG | HEART RATE: 98 BPM | BODY MASS INDEX: 28 KG/M2 | WEIGHT: 162.38 LBS

## 2020-06-02 DIAGNOSIS — Z34.82 ENCOUNTER FOR SUPERVISION OF OTHER NORMAL PREGNANCY IN SECOND TRIMESTER: Primary | ICD-10-CM

## 2020-06-02 PROBLEM — Z34.90 PREGNANCY: Status: ACTIVE | Noted: 2020-06-02

## 2020-06-02 PROBLEM — Z34.90 PREGNANCY (HCC): Status: ACTIVE | Noted: 2020-06-02

## 2020-06-02 PROCEDURE — 84156 ASSAY OF PROTEIN URINE: CPT | Performed by: OBSTETRICS & GYNECOLOGY

## 2020-06-02 PROCEDURE — 84460 ALANINE AMINO (ALT) (SGPT): CPT | Performed by: OBSTETRICS & GYNECOLOGY

## 2020-06-02 PROCEDURE — 84450 TRANSFERASE (AST) (SGOT): CPT | Performed by: OBSTETRICS & GYNECOLOGY

## 2020-06-02 PROCEDURE — 82570 ASSAY OF URINE CREATININE: CPT | Performed by: OBSTETRICS & GYNECOLOGY

## 2020-06-02 PROCEDURE — 99212 OFFICE O/P EST SF 10 MIN: CPT

## 2020-06-02 PROCEDURE — 36415 COLL VENOUS BLD VENIPUNCTURE: CPT

## 2020-06-02 PROCEDURE — 81002 URINALYSIS NONAUTO W/O SCOPE: CPT | Performed by: OBSTETRICS & GYNECOLOGY

## 2020-06-02 PROCEDURE — 1111F DSCHRG MED/CURRENT MED MERGE: CPT | Performed by: OBSTETRICS & GYNECOLOGY

## 2020-06-02 PROCEDURE — 85025 COMPLETE CBC W/AUTO DIFF WBC: CPT | Performed by: OBSTETRICS & GYNECOLOGY

## 2020-06-02 PROCEDURE — 84550 ASSAY OF BLOOD/URIC ACID: CPT | Performed by: OBSTETRICS & GYNECOLOGY

## 2020-06-02 RX ORDER — LABETALOL 100 MG/1
100 TABLET, FILM COATED ORAL 2 TIMES DAILY
Qty: 60 TABLET | Refills: 3 | Status: SHIPPED | OUTPATIENT
Start: 2020-06-02 | End: 2020-06-16 | Stop reason: DRUGHIGH

## 2020-06-02 RX ORDER — ASPIRIN 81 MG/1
TABLET ORAL
Status: ON HOLD | COMMUNITY
Start: 2020-05-31 | End: 2020-08-16

## 2020-06-02 NOTE — TRIAGE
[facility]      Triage Note    Christy Hui Patient Status:  Observation    1985 MRN [id number]   Location [address] [place] [address] Attending Lelon Lombard, [address] [facility] Day # 0 PCP MD Chaz Shaverid Comments       Reason for visit: This is  at 16 5/7 IUP. Pt was sent from the office for B/P monitoring and PIH labs. FHB per doppler in 135s. Pt states she has occasional migraines. Pt denies visual changes, denies RUQ pain.  No swelling, DTR +2 briones

## 2020-06-02 NOTE — PROGRESS NOTES
Sent to [facility] for labs and BP monitoring, pt states had migraine HA yesterday- HA still present but mild, pt has complete h/o previous surgeries written down- sent to scan and entered into med/surg history

## 2020-06-02 NOTE — PROGRESS NOTES
Pt is a 38year old female admitted to TR2/TR2-A. Patient presents with:  R/o Pih: high b/p in the office     Pt is  16w5d intra-uterine pregnancy. History obtained, consents signed. Oriented to room, staff, and plan of care.

## 2020-06-08 ENCOUNTER — TELEPHONE (OUTPATIENT)
Dept: OBGYN CLINIC | Facility: CLINIC | Age: 35
End: 2020-06-08

## 2020-06-16 RX ORDER — LABETALOL 100 MG/1
100 TABLET, FILM COATED ORAL 2 TIMES DAILY
Qty: 60 TABLET | Refills: 3 | OUTPATIENT
Start: 2020-06-16

## 2020-06-16 RX ORDER — LABETALOL 200 MG/1
200 TABLET, FILM COATED ORAL 2 TIMES DAILY
Qty: 180 TABLET | Refills: 1 | Status: ON HOLD | OUTPATIENT
Start: 2020-06-16 | End: 2020-08-16

## 2020-06-18 ENCOUNTER — ROUTINE PRENATAL (OUTPATIENT)
Dept: OBGYN CLINIC | Facility: CLINIC | Age: 35
End: 2020-06-18
Payer: COMMERCIAL

## 2020-06-18 VITALS
BODY MASS INDEX: 28 KG/M2 | HEART RATE: 80 BPM | SYSTOLIC BLOOD PRESSURE: 148 MMHG | DIASTOLIC BLOOD PRESSURE: 100 MMHG | WEIGHT: 165.81 LBS

## 2020-06-18 DIAGNOSIS — Z34.02 ENCOUNTER FOR SUPERVISION OF NORMAL FIRST PREGNANCY IN SECOND TRIMESTER: Primary | ICD-10-CM

## 2020-06-27 NOTE — PROGRESS NOTES
Lizzie Morales    Dear Dr. Will Payan    Thank you for requesting an ultrasound and maternal-fetal medicine consultation on your patient Jean-Claude Jackson.   As you are aware she is a 38year old female  with a glasgow pregnan removed. 1 to 2 months later a repeat hysteroscopy was performed and a small amount of septum was removed again. The second cervix (the one on the left) has a blind pouch -it does not connect to the uterus.     HYPERTENSION  Baseline Preeclampsia Labs more severe disease; use of any drug in this setting is not recommended. BP Review  The patient is presently taking labetalol 200 mg two times daily; her compliance with her antihypertensive regimen is  good.   Her BP log was reviewed and her BP' are p

## 2020-06-29 ENCOUNTER — HOSPITAL ENCOUNTER (OUTPATIENT)
Dept: PERINATAL CARE | Facility: HOSPITAL | Age: 35
Discharge: HOME | End: 2020-06-29
Attending: OBSTETRICS & GYNECOLOGY
Payer: COMMERCIAL

## 2020-06-29 VITALS
WEIGHT: 165 LBS | BODY MASS INDEX: 28.17 KG/M2 | SYSTOLIC BLOOD PRESSURE: 149 MMHG | HEART RATE: 89 BPM | DIASTOLIC BLOOD PRESSURE: 102 MMHG | HEIGHT: 64 IN

## 2020-06-29 DIAGNOSIS — Z98.891 STATUS POST HYSTEROSCOPIC RESECTION OF UTERINE SEPTUM: ICD-10-CM

## 2020-06-29 DIAGNOSIS — O09.292 PRIOR PERINATAL LOSS IN SECOND TRIMESTER, ANTEPARTUM: Primary | ICD-10-CM

## 2020-06-29 DIAGNOSIS — O09.529 ANTEPARTUM MULTIGRAVIDA OF ADVANCED MATERNAL AGE: ICD-10-CM

## 2020-06-29 DIAGNOSIS — O09.292 PRIOR PERINATAL LOSS IN SECOND TRIMESTER, ANTEPARTUM: ICD-10-CM

## 2020-06-29 DIAGNOSIS — D39.11 OVARIAN TUMOR OF BORDERLINE MALIGNANCY, RIGHT: ICD-10-CM

## 2020-06-29 DIAGNOSIS — O26.20 HABITUAL ABORTER, ANTEPARTUM: ICD-10-CM

## 2020-06-29 DIAGNOSIS — O09.292 HISTORY OF PREGNANCY LOSS IN PRIOR PREGNANCY, CURRENTLY PREGNANT IN SECOND TRIMESTER: ICD-10-CM

## 2020-06-29 PROCEDURE — 76811 OB US DETAILED SNGL FETUS: CPT | Performed by: OBSTETRICS & GYNECOLOGY

## 2020-06-29 PROCEDURE — 76817 TRANSVAGINAL US OBSTETRIC: CPT | Performed by: OBSTETRICS & GYNECOLOGY

## 2020-06-29 PROCEDURE — 99215 OFFICE O/P EST HI 40 MIN: CPT | Performed by: OBSTETRICS & GYNECOLOGY

## 2020-06-30 ENCOUNTER — ROUTINE PRENATAL (OUTPATIENT)
Dept: OBGYN CLINIC | Facility: CLINIC | Age: 35
End: 2020-06-30
Payer: COMMERCIAL

## 2020-06-30 VITALS
WEIGHT: 165 LBS | DIASTOLIC BLOOD PRESSURE: 93 MMHG | SYSTOLIC BLOOD PRESSURE: 138 MMHG | HEART RATE: 85 BPM | BODY MASS INDEX: 28 KG/M2

## 2020-06-30 DIAGNOSIS — Z34.02 ENCOUNTER FOR SUPERVISION OF NORMAL FIRST PREGNANCY IN SECOND TRIMESTER: Primary | ICD-10-CM

## 2020-06-30 PROCEDURE — 81002 URINALYSIS NONAUTO W/O SCOPE: CPT | Performed by: OBSTETRICS & GYNECOLOGY

## 2020-06-30 NOTE — PROGRESS NOTES
BP at home today - 108/83- pt brought in photo of BP machine readout. Will send weekly log- states that sometimes she takes 300mg in the evening when she eats a salty meal- discussed goal of keeping diastolics less than 90.

## 2020-07-14 ENCOUNTER — ROUTINE PRENATAL (OUTPATIENT)
Dept: OBGYN CLINIC | Facility: CLINIC | Age: 35
End: 2020-07-14
Payer: COMMERCIAL

## 2020-07-14 ENCOUNTER — LAB ENCOUNTER (OUTPATIENT)
Dept: LAB | Facility: HOSPITAL | Age: 35
End: 2020-07-14
Attending: NURSE PRACTITIONER
Payer: COMMERCIAL

## 2020-07-14 VITALS
DIASTOLIC BLOOD PRESSURE: 90 MMHG | BODY MASS INDEX: 29 KG/M2 | HEART RATE: 85 BPM | WEIGHT: 169 LBS | SYSTOLIC BLOOD PRESSURE: 126 MMHG

## 2020-07-14 DIAGNOSIS — I10 WHITE COAT SYNDROME WITH HYPERTENSION: ICD-10-CM

## 2020-07-14 DIAGNOSIS — Z34.92 ENCOUNTER FOR SUPERVISION OF NORMAL PREGNANCY IN SECOND TRIMESTER, UNSPECIFIED GRAVIDITY: Primary | ICD-10-CM

## 2020-07-14 LAB
ALBUMIN SERPL-MCNC: 2.8 G/DL (ref 3.4–5)
ALBUMIN/GLOB SERPL: 0.7 {RATIO} (ref 1–2)
ALP LIVER SERPL-CCNC: 66 U/L (ref 37–98)
ALT SERPL-CCNC: 24 U/L (ref 13–56)
ANION GAP SERPL CALC-SCNC: 6 MMOL/L (ref 0–18)
APPEARANCE: CLEAR
AST SERPL-CCNC: 15 U/L (ref 15–37)
BASOPHILS # BLD AUTO: 0.04 X10(3) UL (ref 0–0.2)
BASOPHILS NFR BLD AUTO: 0.4 %
BILIRUB SERPL-MCNC: 0.2 MG/DL (ref 0.1–2)
BUN BLD-MCNC: 10 MG/DL (ref 7–18)
BUN/CREAT SERPL: 16.1 (ref 10–20)
CALCIUM BLD-MCNC: 9.2 MG/DL (ref 8.5–10.1)
CHLORIDE SERPL-SCNC: 108 MMOL/L (ref 98–112)
CO2 SERPL-SCNC: 24 MMOL/L (ref 21–32)
CREAT BLD-MCNC: 0.62 MG/DL (ref 0.55–1.02)
DEPRECATED RDW RBC AUTO: 41.2 FL (ref 35.1–46.3)
EOSINOPHIL # BLD AUTO: 0.2 X10(3) UL (ref 0–0.7)
EOSINOPHIL NFR BLD AUTO: 2 %
ERYTHROCYTE [DISTWIDTH] IN BLOOD BY AUTOMATED COUNT: 13.3 % (ref 11–15)
GLOBULIN PLAS-MCNC: 3.8 G/DL (ref 2.8–4.4)
GLUCOSE BLD-MCNC: 72 MG/DL (ref 70–99)
HCT VFR BLD AUTO: 37.2 % (ref 35–48)
HGB BLD-MCNC: 12.3 G/DL (ref 12–16)
IMM GRANULOCYTES # BLD AUTO: 0.04 X10(3) UL (ref 0–1)
IMM GRANULOCYTES NFR BLD: 0.4 %
LYMPHOCYTES # BLD AUTO: 2.73 X10(3) UL (ref 1–4)
LYMPHOCYTES NFR BLD AUTO: 27.9 %
M PROTEIN MFR SERPL ELPH: 6.6 G/DL (ref 6.4–8.2)
MCH RBC QN AUTO: 28.3 PG (ref 26–34)
MCHC RBC AUTO-ENTMCNC: 33.1 G/DL (ref 31–37)
MCV RBC AUTO: 85.5 FL (ref 80–100)
MONOCYTES # BLD AUTO: 1.02 X10(3) UL (ref 0.1–1)
MONOCYTES NFR BLD AUTO: 10.4 %
MULTISTIX LOT#: NORMAL NUMERIC
NEUTROPHILS # BLD AUTO: 5.76 X10 (3) UL (ref 1.5–7.7)
NEUTROPHILS # BLD AUTO: 5.76 X10(3) UL (ref 1.5–7.7)
NEUTROPHILS NFR BLD AUTO: 58.9 %
OSMOLALITY SERPL CALC.SUM OF ELEC: 284 MOSM/KG (ref 275–295)
PATIENT FASTING Y/N/NP: NO
PH, URINE: 7 (ref 4.5–8)
PLATELET # BLD AUTO: 256 10(3)UL (ref 150–450)
POTASSIUM SERPL-SCNC: 4.2 MMOL/L (ref 3.5–5.1)
PROTEIN (URINE DIPSTICK): 100 MG/DL
RBC # BLD AUTO: 4.35 X10(6)UL (ref 3.8–5.3)
SODIUM SERPL-SCNC: 138 MMOL/L (ref 136–145)
SPECIFIC GRAVITY: 1.02 (ref 1–1.03)
URINE-COLOR: YELLOW
WBC # BLD AUTO: 9.8 X10(3) UL (ref 4–11)

## 2020-07-14 PROCEDURE — 85025 COMPLETE CBC W/AUTO DIFF WBC: CPT

## 2020-07-14 PROCEDURE — 36415 COLL VENOUS BLD VENIPUNCTURE: CPT

## 2020-07-14 PROCEDURE — 81002 URINALYSIS NONAUTO W/O SCOPE: CPT | Performed by: OBSTETRICS & GYNECOLOGY

## 2020-07-14 PROCEDURE — 80053 COMPREHEN METABOLIC PANEL: CPT

## 2020-07-14 NOTE — PROGRESS NOTES
Known white coat HTN. Taking BP BID at home and running 110-130s/70-90. 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   Encouraged pt send to our office for surveillance as she is at risk for preE and changing BP meds without di

## 2020-07-20 ENCOUNTER — PATIENT MESSAGE (OUTPATIENT)
Dept: OBGYN CLINIC | Facility: CLINIC | Age: 35
End: 2020-07-20

## 2020-07-20 ENCOUNTER — APPOINTMENT (OUTPATIENT)
Dept: LAB | Age: 35
End: 2020-07-20
Attending: OBSTETRICS & GYNECOLOGY
Payer: COMMERCIAL

## 2020-07-20 DIAGNOSIS — I10 WHITE COAT SYNDROME WITH HYPERTENSION: ICD-10-CM

## 2020-07-20 LAB
M PROTEIN 24H UR ELPH-MRATE: 275.3 MG/24 HR (ref ?–149.1)
SPECIMEN VOL UR: 2220 ML

## 2020-07-20 PROCEDURE — 84156 ASSAY OF PROTEIN URINE: CPT

## 2020-07-21 NOTE — TELEPHONE ENCOUNTER
From: Marium Cerda  To: Lavon Webster DO  Sent: 7/20/2020 8:31 PM CDT  Subject: Visit Follow-up Question    Not sure how worried I should be about the protein. I'm currently still taking labetalol 200mg bid, 100mg qhs.      Here are my blood pressure

## 2020-07-23 ENCOUNTER — TELEPHONE (OUTPATIENT)
Dept: SURGERY | Facility: CLINIC | Age: 35
End: 2020-07-23

## 2020-07-27 ENCOUNTER — HOSPITAL ENCOUNTER (OUTPATIENT)
Dept: PERINATAL CARE | Facility: HOSPITAL | Age: 35
Discharge: HOME | End: 2020-07-27
Attending: OBSTETRICS & GYNECOLOGY
Payer: COMMERCIAL

## 2020-07-27 VITALS
DIASTOLIC BLOOD PRESSURE: 100 MMHG | SYSTOLIC BLOOD PRESSURE: 148 MMHG | WEIGHT: 169 LBS | BODY MASS INDEX: 28.85 KG/M2 | HEIGHT: 64 IN | HEART RATE: 87 BPM

## 2020-07-27 DIAGNOSIS — O26.20 HABITUAL ABORTER, ANTEPARTUM: ICD-10-CM

## 2020-07-27 DIAGNOSIS — O09.529 ANTEPARTUM MULTIGRAVIDA OF ADVANCED MATERNAL AGE: ICD-10-CM

## 2020-07-27 DIAGNOSIS — O09.292 PRIOR PERINATAL LOSS IN SECOND TRIMESTER, ANTEPARTUM: ICD-10-CM

## 2020-07-27 DIAGNOSIS — O16.2 HYPERTENSION AFFECTING PREGNANCY IN SECOND TRIMESTER: ICD-10-CM

## 2020-07-27 DIAGNOSIS — O36.5920 POOR FETAL GROWTH AFFECTING MANAGEMENT OF MOTHER IN SECOND TRIMESTER, SINGLE OR UNSPECIFIED FETUS: Primary | ICD-10-CM

## 2020-07-27 PROCEDURE — 76825 ECHO EXAM OF FETAL HEART: CPT | Performed by: OBSTETRICS & GYNECOLOGY

## 2020-07-27 PROCEDURE — 76816 OB US FOLLOW-UP PER FETUS: CPT | Performed by: OBSTETRICS & GYNECOLOGY

## 2020-07-27 PROCEDURE — 76820 UMBILICAL ARTERY ECHO: CPT | Performed by: OBSTETRICS & GYNECOLOGY

## 2020-07-27 PROCEDURE — 99214 OFFICE O/P EST MOD 30 MIN: CPT | Performed by: OBSTETRICS & GYNECOLOGY

## 2020-07-27 NOTE — PROGRESS NOTES
Pt here for fetal echo ultrasound  Pt states active fetus  Pt denies HA, visual disturbance or epigastric pain

## 2020-07-27 NOTE — PROGRESS NOTES
Tia Hampton  Dear  [address] [facility]     Thank you for requesting an ultrasound and maternal-fetal medicine consultation on your patient Victorino Maldonado.   As you are aware she is a 38year old female  with a glasgow pregn uterus with a uterine septum.  The uterine septum was removed.  1 to 2 months later a repeat hysteroscopy was performed and a small amount of septum was removed again.  The second cervix (the one on the left) has a blind pouch -it does not connect to the u daily  · Continue monitoring for signs and symptoms of superimposed preeclampsia  · Plan delivery at 38-39 weeks     Thank you for allowing me to participate in the care of your patient.   Please do not hesitate to contact me if additional questions or conc

## 2020-08-04 ENCOUNTER — HOSPITAL ENCOUNTER (OUTPATIENT)
Facility: HOSPITAL | Age: 35
Setting detail: OBSERVATION
Discharge: ANOTHER HOSPITAL | End: 2020-08-04
Attending: OBSTETRICS & GYNECOLOGY | Admitting: OBSTETRICS & GYNECOLOGY
Payer: COMMERCIAL

## 2020-08-04 ENCOUNTER — ROUTINE PRENATAL (OUTPATIENT)
Dept: OBGYN CLINIC | Facility: CLINIC | Age: 35
End: 2020-08-04
Payer: COMMERCIAL

## 2020-08-04 ENCOUNTER — HOSPITAL ENCOUNTER (INPATIENT)
Facility: HOSPITAL | Age: 35
LOS: 12 days | Discharge: HOME | End: 2020-08-16
Attending: OBSTETRICS & GYNECOLOGY | Admitting: OBSTETRICS & GYNECOLOGY
Payer: COMMERCIAL

## 2020-08-04 VITALS
WEIGHT: 173 LBS | DIASTOLIC BLOOD PRESSURE: 120 MMHG | HEART RATE: 81 BPM | BODY MASS INDEX: 30 KG/M2 | SYSTOLIC BLOOD PRESSURE: 170 MMHG

## 2020-08-04 VITALS
HEART RATE: 82 BPM | DIASTOLIC BLOOD PRESSURE: 103 MMHG | TEMPERATURE: 98 F | RESPIRATION RATE: 16 BRPM | SYSTOLIC BLOOD PRESSURE: 155 MMHG

## 2020-08-04 DIAGNOSIS — Z34.02 ENCOUNTER FOR SUPERVISION OF NORMAL FIRST PREGNANCY IN SECOND TRIMESTER: Primary | ICD-10-CM

## 2020-08-04 DIAGNOSIS — O13.2 PREGNANCY-INDUCED HYPERTENSION IN SECOND TRIMESTER: ICD-10-CM

## 2020-08-04 DIAGNOSIS — O09.529 ANTEPARTUM MULTIGRAVIDA OF ADVANCED MATERNAL AGE: Primary | ICD-10-CM

## 2020-08-04 DIAGNOSIS — O16.2 HYPERTENSION AFFECTING PREGNANCY IN SECOND TRIMESTER: ICD-10-CM

## 2020-08-04 LAB
ALBUMIN SERPL-MCNC: 2.5 G/DL (ref 3.4–5)
ALBUMIN/GLOB SERPL: 0.6 {RATIO} (ref 1–2)
ALP LIVER SERPL-CCNC: 82 U/L (ref 37–98)
ALT SERPL-CCNC: 26 U/L (ref 13–56)
ANION GAP SERPL CALC-SCNC: 5 MMOL/L (ref 0–18)
APPEARANCE: CLEAR
AST SERPL-CCNC: 22 U/L (ref 15–37)
BASOPHILS # BLD AUTO: 0.04 X10(3) UL (ref 0–0.2)
BASOPHILS NFR BLD AUTO: 0.4 %
BILIRUB SERPL-MCNC: 0.1 MG/DL (ref 0.1–2)
BUN BLD-MCNC: 17 MG/DL (ref 7–18)
BUN/CREAT SERPL: 23.3 (ref 10–20)
CALCIUM BLD-MCNC: 9.6 MG/DL (ref 8.5–10.1)
CHLORIDE SERPL-SCNC: 109 MMOL/L (ref 98–112)
CO2 SERPL-SCNC: 25 MMOL/L (ref 21–32)
CREAT BLD-MCNC: 0.73 MG/DL (ref 0.55–1.02)
CREAT UR-SCNC: 16.4 MG/DL
DEPRECATED RDW RBC AUTO: 41 FL (ref 35.1–46.3)
EOSINOPHIL # BLD AUTO: 0.35 X10(3) UL (ref 0–0.7)
EOSINOPHIL NFR BLD AUTO: 3.4 %
ERYTHROCYTE [DISTWIDTH] IN BLOOD BY AUTOMATED COUNT: 13.3 % (ref 11–15)
GLOBULIN PLAS-MCNC: 4.1 G/DL (ref 2.8–4.4)
GLUCOSE BLD-MCNC: 85 MG/DL (ref 70–99)
HCT VFR BLD AUTO: 36.1 % (ref 35–48)
HGB BLD-MCNC: 12.1 G/DL (ref 12–16)
IMM GRANULOCYTES # BLD AUTO: 0.04 X10(3) UL (ref 0–1)
IMM GRANULOCYTES NFR BLD: 0.4 %
LYMPHOCYTES # BLD AUTO: 3.28 X10(3) UL (ref 1–4)
LYMPHOCYTES NFR BLD AUTO: 31.5 %
M PROTEIN MFR SERPL ELPH: 6.6 G/DL (ref 6.4–8.2)
MCH RBC QN AUTO: 28.5 PG (ref 26–34)
MCHC RBC AUTO-ENTMCNC: 33.5 G/DL (ref 31–37)
MCV RBC AUTO: 84.9 FL (ref 80–100)
MONOCYTES # BLD AUTO: 0.94 X10(3) UL (ref 0.1–1)
MONOCYTES NFR BLD AUTO: 9 %
MULTISTIX LOT#: 1044 NUMERIC
NEUTROPHILS # BLD AUTO: 5.76 X10 (3) UL (ref 1.5–7.7)
NEUTROPHILS # BLD AUTO: 5.76 X10(3) UL (ref 1.5–7.7)
NEUTROPHILS NFR BLD AUTO: 55.3 %
OSMOLALITY SERPL CALC.SUM OF ELEC: 289 MOSM/KG (ref 275–295)
PLATELET # BLD AUTO: 231 10(3)UL (ref 150–450)
POTASSIUM SERPL-SCNC: 4.6 MMOL/L (ref 3.5–5.1)
PROT UR-MCNC: 47 MG/DL
PROT/CREAT UR-RTO: 2.87
RBC # BLD AUTO: 4.25 X10(6)UL (ref 3.8–5.3)
SARS-COV-2 RNA RESP QL NAA+PROBE: NOT DETECTED
SODIUM SERPL-SCNC: 139 MMOL/L (ref 136–145)
URINE-COLOR: YELLOW
WBC # BLD AUTO: 10.4 X10(3) UL (ref 4–11)

## 2020-08-04 PROCEDURE — 99222 1ST HOSP IP/OBS MODERATE 55: CPT | Performed by: OBSTETRICS & GYNECOLOGY

## 2020-08-04 PROCEDURE — 3080F DIAST BP >= 90 MM HG: CPT | Performed by: OBSTETRICS & GYNECOLOGY

## 2020-08-04 PROCEDURE — 81002 URINALYSIS NONAUTO W/O SCOPE: CPT | Performed by: OBSTETRICS & GYNECOLOGY

## 2020-08-04 PROCEDURE — 99234 HOSP IP/OBS SM DT SF/LOW 45: CPT | Performed by: OBSTETRICS & GYNECOLOGY

## 2020-08-04 PROCEDURE — 3077F SYST BP >= 140 MM HG: CPT | Performed by: OBSTETRICS & GYNECOLOGY

## 2020-08-04 RX ORDER — LABETALOL HYDROCHLORIDE 5 MG/ML
INJECTION, SOLUTION INTRAVENOUS
Status: COMPLETED
Start: 2020-08-04 | End: 2020-08-04

## 2020-08-04 RX ORDER — ZOLPIDEM TARTRATE 5 MG/1
5 TABLET ORAL NIGHTLY PRN
Status: DISCONTINUED | OUTPATIENT
Start: 2020-08-04 | End: 2020-08-16

## 2020-08-04 RX ORDER — LABETALOL HYDROCHLORIDE 5 MG/ML
20 INJECTION, SOLUTION INTRAVENOUS ONCE AS NEEDED
Status: DISCONTINUED | OUTPATIENT
Start: 2020-08-04 | End: 2020-08-04

## 2020-08-04 RX ORDER — 0.9 % SODIUM CHLORIDE 0.9 %
VIAL (ML) INJECTION AS NEEDED
Status: DISCONTINUED | OUTPATIENT
Start: 2020-08-04 | End: 2020-08-04

## 2020-08-04 RX ORDER — LABETALOL 200 MG/1
TABLET, FILM COATED ORAL
Status: COMPLETED
Start: 2020-08-04 | End: 2020-08-04

## 2020-08-04 RX ORDER — BETAMETHASONE SODIUM PHOSPHATE AND BETAMETHASONE ACETATE 3; 3 MG/ML; MG/ML
INJECTION, SUSPENSION INTRA-ARTICULAR; INTRALESIONAL; INTRAMUSCULAR; SOFT TISSUE
Status: COMPLETED
Start: 2020-08-04 | End: 2020-08-04

## 2020-08-04 RX ORDER — HYDRALAZINE HYDROCHLORIDE 20 MG/ML
INJECTION INTRAMUSCULAR; INTRAVENOUS
Status: COMPLETED
Start: 2020-08-04 | End: 2020-08-04

## 2020-08-04 RX ORDER — LABETALOL HYDROCHLORIDE 5 MG/ML
20 INJECTION, SOLUTION INTRAVENOUS
Status: DISCONTINUED | OUTPATIENT
Start: 2020-08-04 | End: 2020-08-04

## 2020-08-04 RX ORDER — ACETAMINOPHEN 500 MG
500 TABLET ORAL EVERY 6 HOURS PRN
Status: DISCONTINUED | OUTPATIENT
Start: 2020-08-04 | End: 2020-08-16

## 2020-08-04 RX ORDER — ACETAMINOPHEN 500 MG
1000 TABLET ORAL EVERY 6 HOURS PRN
Status: DISCONTINUED | OUTPATIENT
Start: 2020-08-04 | End: 2020-08-16

## 2020-08-04 RX ORDER — LABETALOL 200 MG/1
100 TABLET, FILM COATED ORAL ONCE
Status: DISCONTINUED | OUTPATIENT
Start: 2020-08-04 | End: 2020-08-04

## 2020-08-04 RX ORDER — LABETALOL 200 MG/1
200 TABLET, FILM COATED ORAL ONCE
Status: DISCONTINUED | OUTPATIENT
Start: 2020-08-04 | End: 2020-08-04

## 2020-08-04 RX ORDER — HYDRALAZINE HYDROCHLORIDE 20 MG/ML
5 INJECTION INTRAMUSCULAR; INTRAVENOUS ONCE
Status: COMPLETED | OUTPATIENT
Start: 2020-08-04 | End: 2020-08-04

## 2020-08-04 RX ORDER — CALCIUM GLUCONATE 94 MG/ML
1 INJECTION, SOLUTION INTRAVENOUS ONCE AS NEEDED
Status: DISCONTINUED | OUTPATIENT
Start: 2020-08-04 | End: 2020-08-04

## 2020-08-04 RX ORDER — HYDRALAZINE HYDROCHLORIDE 20 MG/ML
5 INJECTION INTRAMUSCULAR; INTRAVENOUS ONCE AS NEEDED
Status: COMPLETED | OUTPATIENT
Start: 2020-08-04 | End: 2020-08-04

## 2020-08-04 RX ORDER — BETAMETHASONE SODIUM PHOSPHATE AND BETAMETHASONE ACETATE 3; 3 MG/ML; MG/ML
12 INJECTION, SUSPENSION INTRA-ARTICULAR; INTRALESIONAL; INTRAMUSCULAR; SOFT TISSUE ONCE
Status: DISCONTINUED | OUTPATIENT
Start: 2020-08-04 | End: 2020-08-04

## 2020-08-04 RX ORDER — DOCUSATE SODIUM 100 MG/1
100 CAPSULE, LIQUID FILLED ORAL 2 TIMES DAILY PRN
Status: DISCONTINUED | OUTPATIENT
Start: 2020-08-04 | End: 2020-08-16

## 2020-08-04 RX ORDER — BETAMETHASONE SODIUM PHOSPHATE AND BETAMETHASONE ACETATE 3; 3 MG/ML; MG/ML
12 INJECTION, SUSPENSION INTRA-ARTICULAR; INTRALESIONAL; INTRAMUSCULAR; SOFT TISSUE ONCE
Status: COMPLETED | OUTPATIENT
Start: 2020-08-05 | End: 2020-08-05

## 2020-08-04 RX ORDER — LABETALOL HYDROCHLORIDE 5 MG/ML
40 INJECTION, SOLUTION INTRAVENOUS ONCE AS NEEDED
Status: DISCONTINUED | OUTPATIENT
Start: 2020-08-04 | End: 2020-08-04

## 2020-08-04 RX ORDER — HYDRALAZINE HYDROCHLORIDE 20 MG/ML
10 INJECTION INTRAMUSCULAR; INTRAVENOUS ONCE AS NEEDED
Status: DISCONTINUED | OUTPATIENT
Start: 2020-08-04 | End: 2020-08-04

## 2020-08-04 RX ORDER — CHOLECALCIFEROL (VITAMIN D3) 25 MCG
1 TABLET,CHEWABLE ORAL DAILY
Status: DISCONTINUED | OUTPATIENT
Start: 2020-08-04 | End: 2020-08-16

## 2020-08-04 RX ORDER — HYDRALAZINE HYDROCHLORIDE 20 MG/ML
INJECTION INTRAMUSCULAR; INTRAVENOUS
Status: DISCONTINUED | OUTPATIENT
Start: 2020-08-04 | End: 2020-08-04

## 2020-08-04 RX ORDER — CALCIUM CARBONATE 200(500)MG
1000 TABLET,CHEWABLE ORAL
Status: DISCONTINUED | OUTPATIENT
Start: 2020-08-04 | End: 2020-08-16

## 2020-08-04 RX ORDER — LABETALOL 200 MG/1
100 TABLET, FILM COATED ORAL ONCE
Status: COMPLETED | OUTPATIENT
Start: 2020-08-04 | End: 2020-08-04

## 2020-08-04 NOTE — PROGRESS NOTES
Taking labetolol 200 mg bid plus 100 mg q hs. BP at home 130s/.   To [facility] for monitoring

## 2020-08-04 NOTE — PROGRESS NOTES
Pt is a 38year old female admitted to TR2/TR2-A. No chief complaint on file. Pt is [id number] 25w5d intra-uterine pregnancy. History obtained, consents signed. Oriented to room, staff, and plan of care.

## 2020-08-05 LAB
ALBUMIN SERPL-MCNC: 2.5 G/DL (ref 3.4–5)
ALBUMIN/GLOB SERPL: 0.6 {RATIO} (ref 1–2)
ALP LIVER SERPL-CCNC: 82 U/L (ref 37–98)
ALT SERPL-CCNC: 26 U/L (ref 13–56)
ANION GAP SERPL CALC-SCNC: 6 MMOL/L (ref 0–18)
ANTIBODY SCREEN: NEGATIVE
AST SERPL-CCNC: 18 U/L (ref 15–37)
BASOPHILS # BLD AUTO: 0.01 X10(3) UL (ref 0–0.2)
BASOPHILS NFR BLD AUTO: 0.1 %
BILIRUB SERPL-MCNC: 0.2 MG/DL (ref 0.1–2)
BUN BLD-MCNC: 16 MG/DL (ref 7–18)
BUN/CREAT SERPL: 22.9 (ref 10–20)
CALCIUM BLD-MCNC: 9.7 MG/DL (ref 8.5–10.1)
CHLORIDE SERPL-SCNC: 111 MMOL/L (ref 98–112)
CO2 SERPL-SCNC: 20 MMOL/L (ref 21–32)
CREAT BLD-MCNC: 0.7 MG/DL (ref 0.55–1.02)
DEPRECATED RDW RBC AUTO: 41.1 FL (ref 35.1–46.3)
EOSINOPHIL # BLD AUTO: 0 X10(3) UL (ref 0–0.7)
EOSINOPHIL NFR BLD AUTO: 0 %
ERYTHROCYTE [DISTWIDTH] IN BLOOD BY AUTOMATED COUNT: 13.3 % (ref 11–15)
GLOBULIN PLAS-MCNC: 4.1 G/DL (ref 2.8–4.4)
GLUCOSE BLD-MCNC: 101 MG/DL (ref 70–99)
HCT VFR BLD AUTO: 38.2 % (ref 35–48)
HGB BLD-MCNC: 12.3 G/DL (ref 12–16)
IMM GRANULOCYTES # BLD AUTO: 0.08 X10(3) UL (ref 0–1)
IMM GRANULOCYTES NFR BLD: 0.7 %
LYMPHOCYTES # BLD AUTO: 1.77 X10(3) UL (ref 1–4)
LYMPHOCYTES NFR BLD AUTO: 15 %
M PROTEIN MFR SERPL ELPH: 6.6 G/DL (ref 6.4–8.2)
MCH RBC QN AUTO: 27.6 PG (ref 26–34)
MCHC RBC AUTO-ENTMCNC: 32.2 G/DL (ref 31–37)
MCV RBC AUTO: 85.7 FL (ref 80–100)
MONOCYTES # BLD AUTO: 0.17 X10(3) UL (ref 0.1–1)
MONOCYTES NFR BLD AUTO: 1.4 %
NEUTROPHILS # BLD AUTO: 9.79 X10 (3) UL (ref 1.5–7.7)
NEUTROPHILS # BLD AUTO: 9.79 X10(3) UL (ref 1.5–7.7)
NEUTROPHILS NFR BLD AUTO: 82.8 %
OSMOLALITY SERPL CALC.SUM OF ELEC: 285 MOSM/KG (ref 275–295)
PLATELET # BLD AUTO: 254 10(3)UL (ref 150–450)
POTASSIUM SERPL-SCNC: 4.5 MMOL/L (ref 3.5–5.1)
RBC # BLD AUTO: 4.46 X10(6)UL (ref 3.8–5.3)
RH BLOOD TYPE: POSITIVE
SODIUM SERPL-SCNC: 137 MMOL/L (ref 136–145)
WBC # BLD AUTO: 11.8 X10(3) UL (ref 4–11)

## 2020-08-05 PROCEDURE — 99232 SBSQ HOSP IP/OBS MODERATE 35: CPT | Performed by: OBSTETRICS & GYNECOLOGY

## 2020-08-05 RX ORDER — BETAMETHASONE SODIUM PHOSPHATE AND BETAMETHASONE ACETATE 3; 3 MG/ML; MG/ML
INJECTION, SUSPENSION INTRA-ARTICULAR; INTRALESIONAL; INTRAMUSCULAR; SOFT TISSUE
Status: COMPLETED
Start: 2020-08-05 | End: 2020-08-05

## 2020-08-05 RX ORDER — NIFEDIPINE 10 MG/1
10 CAPSULE ORAL AS NEEDED
Status: DISCONTINUED | OUTPATIENT
Start: 2020-08-04 | End: 2020-08-06

## 2020-08-05 RX ORDER — HYDRALAZINE HYDROCHLORIDE 20 MG/ML
5 INJECTION INTRAMUSCULAR; INTRAVENOUS ONCE AS NEEDED
Status: DISPENSED | OUTPATIENT
Start: 2020-08-05 | End: 2020-08-05

## 2020-08-05 RX ORDER — LABETALOL 200 MG/1
400 TABLET, FILM COATED ORAL 3 TIMES DAILY
Status: DISCONTINUED | OUTPATIENT
Start: 2020-08-05 | End: 2020-08-07

## 2020-08-05 RX ORDER — LABETALOL HYDROCHLORIDE 5 MG/ML
40 INJECTION, SOLUTION INTRAVENOUS ONCE AS NEEDED
Status: ACTIVE | OUTPATIENT
Start: 2020-08-05 | End: 2020-08-05

## 2020-08-05 RX ORDER — HYDRALAZINE HYDROCHLORIDE 20 MG/ML
5 INJECTION INTRAMUSCULAR; INTRAVENOUS AS NEEDED
Status: DISCONTINUED | OUTPATIENT
Start: 2020-08-04 | End: 2020-08-06

## 2020-08-05 RX ORDER — LABETALOL HYDROCHLORIDE 5 MG/ML
20 INJECTION, SOLUTION INTRAVENOUS ONCE AS NEEDED
Status: ACTIVE | OUTPATIENT
Start: 2020-08-05 | End: 2020-08-05

## 2020-08-05 RX ORDER — HYDRALAZINE HYDROCHLORIDE 20 MG/ML
10 INJECTION INTRAMUSCULAR; INTRAVENOUS ONCE AS NEEDED
Status: DISPENSED | OUTPATIENT
Start: 2020-08-05 | End: 2020-08-05

## 2020-08-05 NOTE — TRIAGE
[facility]      [facility] Note    [facility] Patient Status:  Observation    1985 MRN [id number]   Location [address] Attending Liana Prescott, [address] [facility] Day # 0 PCP Jacolyn Habermann, MD Freida Clear BPM           Uterine Irritability: No                                   Acoustic Stimulator: No           Nonstress Test Interpretation: Appropriate for gestational age                                  Additional Comments       Reason for visit: pt sent o

## 2020-08-05 NOTE — PAYOR COMM NOTE
--------------  ADMISSION REVIEW     Payor: [facility]  Subscriber #:  [id number]  Authorization Number: [id number]    Admit date: 8/4/20  Admit time: 2123       Admitting Physician: Nilsa Johnson MD  Attending Physician:  Nilsa Johnson MD  Primary Care Ph rhythm, regular rate and rhythm, S1, S2 normal, no murmur, click, rub or gallop   Abdomen: FHT present   Fetal Surveillance:  145 BPM   Appropriate for GA      Cervix: defer     Lab Review:  Normal LFT's, Platelet:231,000       ASSESSMENT:    25 and 5/7 we hospital..    Cardiovascular: Positive for leg swelling. Negative for chest pain. 1+ swelling         HYPERTENSION  BP< 20 weeks 126-163/  BP>20 weeks 126-152/90-98. At 20w4d was on labetalol 200mg bid.   BP yesterday 170/120 . 2+ protein whic care.  Our discussion is summarized above. The approximate physician face-to-face time was 60 minutes.         IV Hydralazine x 1 dose given      PLEASE PROVIDE INPATIENT AUTHORIZATION. THANK YOU.

## 2020-08-05 NOTE — H&P
[facility] Patient Status:  Observation    1985 MRN [id number]   Location [address] [facility] Attending Jose Salvador, [address] [facility] Day # 0 PCP Sukumar Gallo MD     Date of 100mg BID            5/5/2020: BP log      Habitual aborter, antepartum            04-07-20  Negative APL, Beta 2 microglobulin and            KEILA 01-13-19.        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  ), Disp: 180 tablet, Rfl: 1, Taking  aspirin (ASPIRIN 81) 81 MG Oral Tab EC, , Disp: , Rfl: , Taking  loratadine (CLARITIN) 10 MG Oral Tab, , Disp: , Rfl: , Taking  Psyllium (METAMUCIL OR), , Disp: , GFR, -American 123 >=60    ALT 26 13 - 56 U/L    AST 22 15 - 37 U/L    Alkaline Phosphatase 82 37 - 98 U/L    Bilirubin, Total 0.1 0.1 - 2.0 mg/dL    Total Protein 6.6 6.4 - 8.2 g/dL    Albumin 2.5 (L) 3.4 - 5.0 g/dL    Globulin  4.1 2.8 - 4.4 g/dL IV labetalol, will given 100mg PO now and continue IV meds so avoid reflex hypotension. Dr. Carmel Cody states to wait on Mag Sulfate for now. Will start 24h urine collection. All questions answered and she voice sunderstanding.       Rios Baird  8/4/20

## 2020-08-05 NOTE — PROGRESS NOTES
Pt is a 38year old female admitted to 1105/1105-A. Patient presents with:  Elevated BP: Patient is a transfer from [place] for elevated blood pressures. She denies leaking of fluid or vaginal bleeding.  She denies contractions and reports good fetal mov

## 2020-08-05 NOTE — H&P
[facility]  History & Physical    Victorino Paxson Patient Status:  Inpatient    1985 MRN [id number]   [facility] 1SW-J Attending Ines Mayo MD    0 PCP Moira Harrison MD     CC: patient is here for high BP    SUBJECTIVE: Visual impairment    • Loa teeth extracted 2011     Past Social History:   Past Surgical History:   Procedure Laterality Date   • DILATION & CURETTAGE SUCTION N/A 1/13/2019    Performed by Angie Walters MD at [address] 86  Resp:  [16-18] 18  BP: (130-170)/() 170/106    Lungs:   clear to auscultation bilaterally   Heart:   regular rate and rhythm, regular rate and rhythm, S1, S2 normal, no murmur, click, rub or gallop   Abdomen: FHT present   Fetal Surveillance:  14

## 2020-08-05 NOTE — PROGRESS NOTES
Received patient from [facility] in stable condition. Plan of care discussed with patient and support person. VSS. DTR's +2 brachial and patellar. Bilateral clear breath sounds noted.  Pt denies feeling headaches, blurry vision, epigastric pain at this john

## 2020-08-05 NOTE — PROGRESS NOTES
Call from [facility] at [facility] for report on pt. [facility] en route. Will ctm.

## 2020-08-05 NOTE — PROGRESS NOTES
Discharged to [facility] via stretcher in stable condition accompanied by spouse and [facility] ambulance transfer team.

## 2020-08-05 NOTE — CONSULTS
[facility]  Maternal-Fetal Medicine Inpatient Consultation    Date of Admission:  8/4/2020  Date of Consult:  8/5/2020    Reason for Consult:   preeclampsia    History of Present Illness:  Victorino Maldonado is a a(n) 38year old female.  [id number] wit is blind pouch, left cervix is patent, no bicornuate uterus   • Exposure to TB    • History of abnormal cervical Pap smear     at age 24   • History of chicken pox     in childhood    • Infertility, female 12/2017    evaluation with R cervix blind pouch & Date/Time     AST 15 07/14/2020 09:04 AM     ALT 24 07/14/2020 09:04 AM     CREATSERUM 0.62 07/14/2020 09:04 AM     HGB 12.3 07/14/2020 09:04 AM     .0 07/14/2020 09:04 AM      See prior MFM notes for a detailed review.     BP Review  The patient is Daily PRN  •  docusate sodium (COLACE) cap 100 mg, 100 mg, Oral, BID PRN  •  prenatal multivitamin plus DHA (PNV with DHA) cap 1 capsule, 1 capsule, Oral, Daily  •  Labetalol HCl (NORMODYNE) tab 300 mg, 300 mg, Oral, TID  •  betamethasone sod phos & acet ( severe range BP and elevated P/C ratio, hx hysteroscopic uterine septum resection, AMA    IMPRESSION:   · IUP at 25w6d   · Fetus measuring small for dates 11.6%. 470g on 7/27.   · History of 15-week miscarriage  · AMA: low-risk cell free fetal DNA, decline

## 2020-08-06 ENCOUNTER — ULTRASOUND ENCOUNTER (OUTPATIENT)
Dept: PERINATAL CARE | Facility: HOSPITAL | Age: 35
End: 2020-08-06
Attending: OBSTETRICS & GYNECOLOGY
Payer: COMMERCIAL

## 2020-08-06 LAB
ALBUMIN SERPL-MCNC: 2.4 G/DL (ref 3.4–5)
ALBUMIN/GLOB SERPL: 0.6 {RATIO} (ref 1–2)
ALP LIVER SERPL-CCNC: 75 U/L (ref 37–98)
ALT SERPL-CCNC: 24 U/L (ref 13–56)
ANION GAP SERPL CALC-SCNC: 7 MMOL/L (ref 0–18)
AST SERPL-CCNC: 18 U/L (ref 15–37)
BASOPHILS # BLD AUTO: 0.02 X10(3) UL (ref 0–0.2)
BASOPHILS NFR BLD AUTO: 0.2 %
BILIRUB SERPL-MCNC: 0.1 MG/DL (ref 0.1–2)
BUN BLD-MCNC: 16 MG/DL (ref 7–18)
BUN/CREAT SERPL: 20.3 (ref 10–20)
CALCIUM BLD-MCNC: 8.7 MG/DL (ref 8.5–10.1)
CHLORIDE SERPL-SCNC: 110 MMOL/L (ref 98–112)
CO2 SERPL-SCNC: 20 MMOL/L (ref 21–32)
CREAT BLD-MCNC: 0.79 MG/DL (ref 0.55–1.02)
DEPRECATED RDW RBC AUTO: 42.1 FL (ref 35.1–46.3)
EOSINOPHIL # BLD AUTO: 0 X10(3) UL (ref 0–0.7)
EOSINOPHIL NFR BLD AUTO: 0 %
ERYTHROCYTE [DISTWIDTH] IN BLOOD BY AUTOMATED COUNT: 13.6 % (ref 11–15)
GLOBULIN PLAS-MCNC: 3.8 G/DL (ref 2.8–4.4)
GLUCOSE BLD-MCNC: 114 MG/DL (ref 70–99)
HCT VFR BLD AUTO: 34 % (ref 35–48)
HGB BLD-MCNC: 11.1 G/DL (ref 12–16)
HIV 1+2 AB+HIV1 P24 AG SERPL QL IA: NONREACTIVE
IMM GRANULOCYTES # BLD AUTO: 0.12 X10(3) UL (ref 0–1)
IMM GRANULOCYTES NFR BLD: 1 %
LYMPHOCYTES # BLD AUTO: 1.82 X10(3) UL (ref 1–4)
LYMPHOCYTES NFR BLD AUTO: 15 %
M PROTEIN MFR SERPL ELPH: 6.2 G/DL (ref 6.4–8.2)
MCH RBC QN AUTO: 27.8 PG (ref 26–34)
MCHC RBC AUTO-ENTMCNC: 32.6 G/DL (ref 31–37)
MCV RBC AUTO: 85 FL (ref 80–100)
MONOCYTES # BLD AUTO: 0.29 X10(3) UL (ref 0.1–1)
MONOCYTES NFR BLD AUTO: 2.4 %
NEUTROPHILS # BLD AUTO: 9.92 X10 (3) UL (ref 1.5–7.7)
NEUTROPHILS # BLD AUTO: 9.92 X10(3) UL (ref 1.5–7.7)
NEUTROPHILS NFR BLD AUTO: 81.4 %
OSMOLALITY SERPL CALC.SUM OF ELEC: 286 MOSM/KG (ref 275–295)
PLATELET # BLD AUTO: 222 10(3)UL (ref 150–450)
POTASSIUM SERPL-SCNC: 4.3 MMOL/L (ref 3.5–5.1)
RBC # BLD AUTO: 4 X10(6)UL (ref 3.8–5.3)
SODIUM SERPL-SCNC: 137 MMOL/L (ref 136–145)
WBC # BLD AUTO: 12.2 X10(3) UL (ref 4–11)

## 2020-08-06 PROCEDURE — 76815 OB US LIMITED FETUS(S): CPT | Performed by: OBSTETRICS & GYNECOLOGY

## 2020-08-06 RX ORDER — HYDRALAZINE HYDROCHLORIDE 20 MG/ML
INJECTION INTRAMUSCULAR; INTRAVENOUS
Status: COMPLETED
Start: 2020-08-06 | End: 2020-08-06

## 2020-08-06 RX ORDER — HYDRALAZINE HYDROCHLORIDE 20 MG/ML
5 INJECTION INTRAMUSCULAR; INTRAVENOUS ONCE
Status: COMPLETED | OUTPATIENT
Start: 2020-08-06 | End: 2020-08-06

## 2020-08-06 NOTE — CONSULTS
[facility]    Maternal Fetal Medicine Progress Note    [place] Sett[place] Patient Status:  Inpatient    1985 MRN [id number]   [facility] 1SW-J Attending Madi Mead MD   Hosp Day # 2 PCP Jeevan Yu MD     SUBJECTIVE:  Patient has for breakthrough hypertension  · NICU consult. · Continue observation in hospital.  · Deliver for severe criteria. · Daily labs (CBC, Cr, AST/ALT) x 2 days, then space to twice a week.      Questions/concerns were discussed with patient and/or family at b

## 2020-08-06 NOTE — PROGRESS NOTES
[facility]  OB/GYN: Antepartum Progress Note     SUBJECTIVE:  Pt is a 38year old  female at 26w0d who was admitted on 2020 for cHTN with superimposed pre eclampsia. Fetal Movement: normal. Vaginal Bleeding: denies.  Contractions: d admitted on 8/4/2020 for cHTN with superimposed pre eclampsia.      Hospital Day 2    Active Problems:  Patient Active Problem List:     Microcytic anemia     Vitamin D deficiency     Ovarian tumor of borderline malignancy, right     AMA-36     Hyperglycemi uterus  - 04/2018 SAB at 8 wks  - 01/2019 SAB at 16 wks s/p D&C by Dr. Galen Chang with negative hypercoagulable evaluation and negative chromosomal work up   - 03/2019 and 05/2019 hysteroscopy with partial septum that was removed and repeat noted for removal of

## 2020-08-06 NOTE — IMAGING NOTE
Single IUP in transverse presentation. Placenta is anterior. Cardiac activity is present at 147 bpm  JOSE is  12.6 cm.   MVP is 6.3 cm  Normal UA Dopplers

## 2020-08-06 NOTE — PAYOR COMM NOTE
--------------  CONTINUED STAY REVIEW    Payor: [facility]  Subscriber #:  [id number]  Authorization Number: [id number]    Admit date: 8/4/20  Admit time: 2123    Admitting Physician: Mariah Roberts MD  Attending Physician:  Mariah Roberts MD  Primary Care P signs/symptoms of pre eclampsia   - general diet   - activity as tolerated   - saline lock   - daily NST   - d/w patient that PCS might be indicated when delivery is indicated as IOL might not be a valid option, however will determine safest mode of delive

## 2020-08-07 LAB
ALBUMIN SERPL-MCNC: 2.3 G/DL (ref 3.4–5)
ALBUMIN/GLOB SERPL: 0.7 {RATIO} (ref 1–2)
ALP LIVER SERPL-CCNC: 67 U/L (ref 37–98)
ALT SERPL-CCNC: 22 U/L (ref 13–56)
ANION GAP SERPL CALC-SCNC: 4 MMOL/L (ref 0–18)
AST SERPL-CCNC: 14 U/L (ref 15–37)
BASOPHILS # BLD AUTO: 0.02 X10(3) UL (ref 0–0.2)
BASOPHILS NFR BLD AUTO: 0.2 %
BILIRUB SERPL-MCNC: 0.1 MG/DL (ref 0.1–2)
BUN BLD-MCNC: 18 MG/DL (ref 7–18)
BUN/CREAT SERPL: 27.3 (ref 10–20)
CALCIUM BLD-MCNC: 8.3 MG/DL (ref 8.5–10.1)
CHLORIDE SERPL-SCNC: 111 MMOL/L (ref 98–112)
CO2 SERPL-SCNC: 23 MMOL/L (ref 21–32)
CREAT BLD-MCNC: 0.66 MG/DL (ref 0.55–1.02)
DEPRECATED RDW RBC AUTO: 42.5 FL (ref 35.1–46.3)
EOSINOPHIL # BLD AUTO: 0.01 X10(3) UL (ref 0–0.7)
EOSINOPHIL NFR BLD AUTO: 0.1 %
ERYTHROCYTE [DISTWIDTH] IN BLOOD BY AUTOMATED COUNT: 13.6 % (ref 11–15)
GLOBULIN PLAS-MCNC: 3.4 G/DL (ref 2.8–4.4)
GLUCOSE BLD-MCNC: 85 MG/DL (ref 70–99)
HCT VFR BLD AUTO: 33.3 % (ref 35–48)
HGB BLD-MCNC: 10.6 G/DL (ref 12–16)
IMM GRANULOCYTES # BLD AUTO: 0.11 X10(3) UL (ref 0–1)
IMM GRANULOCYTES NFR BLD: 1 %
LYMPHOCYTES # BLD AUTO: 3.02 X10(3) UL (ref 1–4)
LYMPHOCYTES NFR BLD AUTO: 28.8 %
M PROTEIN MFR SERPL ELPH: 5.7 G/DL (ref 6.4–8.2)
MCH RBC QN AUTO: 27.7 PG (ref 26–34)
MCHC RBC AUTO-ENTMCNC: 31.8 G/DL (ref 31–37)
MCV RBC AUTO: 86.9 FL (ref 80–100)
MONOCYTES # BLD AUTO: 0.96 X10(3) UL (ref 0.1–1)
MONOCYTES NFR BLD AUTO: 9.2 %
NEUTROPHILS # BLD AUTO: 6.36 X10 (3) UL (ref 1.5–7.7)
NEUTROPHILS # BLD AUTO: 6.36 X10(3) UL (ref 1.5–7.7)
NEUTROPHILS NFR BLD AUTO: 60.7 %
OSMOLALITY SERPL CALC.SUM OF ELEC: 287 MOSM/KG (ref 275–295)
PLATELET # BLD AUTO: 215 10(3)UL (ref 150–450)
POTASSIUM SERPL-SCNC: 4.3 MMOL/L (ref 3.5–5.1)
RBC # BLD AUTO: 3.83 X10(6)UL (ref 3.8–5.3)
SODIUM SERPL-SCNC: 138 MMOL/L (ref 136–145)
URATE SERPL-MCNC: 6.1 MG/DL (ref 2.6–6)
WBC # BLD AUTO: 10.5 X10(3) UL (ref 4–11)

## 2020-08-07 PROCEDURE — 99232 SBSQ HOSP IP/OBS MODERATE 35: CPT | Performed by: OBSTETRICS & GYNECOLOGY

## 2020-08-07 PROCEDURE — 59025 FETAL NON-STRESS TEST: CPT | Performed by: OBSTETRICS & GYNECOLOGY

## 2020-08-07 RX ORDER — DIAPER,BRIEF,INFANT-TODD,DISP
EACH MISCELLANEOUS 3 TIMES DAILY PRN
Status: DISCONTINUED | OUTPATIENT
Start: 2020-08-07 | End: 2020-08-16

## 2020-08-07 RX ORDER — LABETALOL 200 MG/1
400 TABLET, FILM COATED ORAL EVERY 8 HOURS SCHEDULED
Status: DISCONTINUED | OUTPATIENT
Start: 2020-08-07 | End: 2020-08-11

## 2020-08-07 RX ORDER — DIPHENHYDRAMINE HYDROCHLORIDE, ZINC ACETATE 2; .1 G/100G; G/100G
1 CREAM TOPICAL 3 TIMES DAILY PRN
Status: DISCONTINUED | OUTPATIENT
Start: 2020-08-07 | End: 2020-08-16

## 2020-08-07 RX ORDER — HYDRALAZINE HYDROCHLORIDE 20 MG/ML
5 INJECTION INTRAMUSCULAR; INTRAVENOUS ONCE
Status: COMPLETED | OUTPATIENT
Start: 2020-08-07 | End: 2020-08-07

## 2020-08-07 NOTE — PROGRESS NOTES
[facility]  Obstetrics and Gynecology    OB/GYN: Antepartum Progress Note     SUBJECTIVE:  Patient is a 38year old  female at 26w1d admitted for [facility] with [address] [facility]. Patient reports feeling well. No HA or RUQ pain.      Had one dose of IV Hydra

## 2020-08-07 NOTE — CONSULTS
[facility]    Maternal Fetal Medicine Progress Note    Miller Rene Patient Status:  Inpatient    1985 MRN [id number]   [facility] 1SW-J Attending Mariah Roberts MD   Hosp Day # 3 PCP Trey Aguilera MD     SUBJECTIVE:  No complain hospital.  · Deliver for severe criteria. · Daily labs (CBC, Cr, AST/ALT) x 2 days, then space to twice a week. Questions/concerns were discussed with patient and/or family at bedside.     Total Time spent with patient and coordinating care:  15 augusta

## 2020-08-08 PROCEDURE — 99232 SBSQ HOSP IP/OBS MODERATE 35: CPT | Performed by: OBSTETRICS & GYNECOLOGY

## 2020-08-08 PROCEDURE — 59025 FETAL NON-STRESS TEST: CPT | Performed by: OBSTETRICS & GYNECOLOGY

## 2020-08-08 RX ORDER — HYDRALAZINE HYDROCHLORIDE 20 MG/ML
5 INJECTION INTRAMUSCULAR; INTRAVENOUS ONCE
Status: COMPLETED | OUTPATIENT
Start: 2020-08-08 | End: 2020-08-08

## 2020-08-08 RX ORDER — NIFEDIPINE 30 MG/1
30 TABLET, EXTENDED RELEASE ORAL DAILY
Status: DISCONTINUED | OUTPATIENT
Start: 2020-08-08 | End: 2020-08-09

## 2020-08-08 NOTE — PROGRESS NOTES
[address] [facility]  Obstetrics and Gynecology    OB/GYN: Antepartum Progress Note     SUBJECTIVE:  Patient is a 38year old  female at 26w2d admitted for [facility] with [address] [facility]. Patient reports feeling well. No HA or RUQ pain.           OBJECTIVE:

## 2020-08-08 NOTE — CONSULTS
[facility]    Maternal Fetal Medicine Progress Note    [facility] Patient Status:  Inpatient    1985 MRN [id number]   [facility] 1SW-J Attending Miguel Marquez MD   Hosp Day # 4 PCP Yulia Valdes MD     SUBJECTIVE:  No symptoms

## 2020-08-09 LAB
ALBUMIN SERPL-MCNC: 2.1 G/DL (ref 3.4–5)
ALBUMIN/GLOB SERPL: 0.6 {RATIO} (ref 1–2)
ALP LIVER SERPL-CCNC: 77 U/L (ref 37–98)
ALT SERPL-CCNC: 27 U/L (ref 13–56)
ANION GAP SERPL CALC-SCNC: 3 MMOL/L (ref 0–18)
ANTIBODY SCREEN: NEGATIVE
AST SERPL-CCNC: 17 U/L (ref 15–37)
BASOPHILS # BLD AUTO: 0.03 X10(3) UL (ref 0–0.2)
BASOPHILS NFR BLD AUTO: 0.3 %
BILIRUB SERPL-MCNC: 0.2 MG/DL (ref 0.1–2)
BUN BLD-MCNC: 13 MG/DL (ref 7–18)
BUN/CREAT SERPL: 17.3 (ref 10–20)
CALCIUM BLD-MCNC: 8.1 MG/DL (ref 8.5–10.1)
CHLORIDE SERPL-SCNC: 110 MMOL/L (ref 98–112)
CO2 SERPL-SCNC: 23 MMOL/L (ref 21–32)
CREAT BLD-MCNC: 0.75 MG/DL (ref 0.55–1.02)
DEPRECATED RDW RBC AUTO: 42.7 FL (ref 35.1–46.3)
EOSINOPHIL # BLD AUTO: 0.26 X10(3) UL (ref 0–0.7)
EOSINOPHIL NFR BLD AUTO: 2.4 %
ERYTHROCYTE [DISTWIDTH] IN BLOOD BY AUTOMATED COUNT: 13.5 % (ref 11–15)
GLOBULIN PLAS-MCNC: 3.3 G/DL (ref 2.8–4.4)
GLUCOSE BLD-MCNC: 105 MG/DL (ref 70–99)
HCT VFR BLD AUTO: 33.4 % (ref 35–48)
HGB BLD-MCNC: 10.8 G/DL (ref 12–16)
IMM GRANULOCYTES # BLD AUTO: 0.12 X10(3) UL (ref 0–1)
IMM GRANULOCYTES NFR BLD: 1.1 %
LYMPHOCYTES # BLD AUTO: 3.02 X10(3) UL (ref 1–4)
LYMPHOCYTES NFR BLD AUTO: 27.5 %
M PROTEIN MFR SERPL ELPH: 5.4 G/DL (ref 6.4–8.2)
MCH RBC QN AUTO: 28.1 PG (ref 26–34)
MCHC RBC AUTO-ENTMCNC: 32.3 G/DL (ref 31–37)
MCV RBC AUTO: 87 FL (ref 80–100)
MONOCYTES # BLD AUTO: 0.87 X10(3) UL (ref 0.1–1)
MONOCYTES NFR BLD AUTO: 7.9 %
NEUTROPHILS # BLD AUTO: 6.68 X10 (3) UL (ref 1.5–7.7)
NEUTROPHILS # BLD AUTO: 6.68 X10(3) UL (ref 1.5–7.7)
NEUTROPHILS NFR BLD AUTO: 60.8 %
OSMOLALITY SERPL CALC.SUM OF ELEC: 282 MOSM/KG (ref 275–295)
PLATELET # BLD AUTO: 176 10(3)UL (ref 150–450)
POTASSIUM SERPL-SCNC: 4.2 MMOL/L (ref 3.5–5.1)
RBC # BLD AUTO: 3.84 X10(6)UL (ref 3.8–5.3)
RH BLOOD TYPE: POSITIVE
SODIUM SERPL-SCNC: 136 MMOL/L (ref 136–145)
URATE SERPL-MCNC: 6.3 MG/DL (ref 2.6–6)
WBC # BLD AUTO: 11 X10(3) UL (ref 4–11)

## 2020-08-09 PROCEDURE — 99232 SBSQ HOSP IP/OBS MODERATE 35: CPT | Performed by: OBSTETRICS & GYNECOLOGY

## 2020-08-09 RX ORDER — ONDANSETRON 2 MG/ML
4 INJECTION INTRAMUSCULAR; INTRAVENOUS ONCE
Status: COMPLETED | OUTPATIENT
Start: 2020-08-09 | End: 2020-08-09

## 2020-08-09 RX ORDER — HYDRALAZINE HYDROCHLORIDE 20 MG/ML
5 INJECTION INTRAMUSCULAR; INTRAVENOUS ONCE
Status: COMPLETED | OUTPATIENT
Start: 2020-08-09 | End: 2020-08-09

## 2020-08-09 RX ORDER — CETIRIZINE HYDROCHLORIDE 10 MG/1
10 TABLET ORAL DAILY PRN
Status: DISCONTINUED | OUTPATIENT
Start: 2020-08-09 | End: 2020-08-16

## 2020-08-09 RX ORDER — ONDANSETRON 2 MG/ML
INJECTION INTRAMUSCULAR; INTRAVENOUS
Status: COMPLETED
Start: 2020-08-09 | End: 2020-08-09

## 2020-08-09 RX ORDER — NIFEDIPINE 30 MG/1
30 TABLET, EXTENDED RELEASE ORAL EVERY 12 HOURS
Status: DISCONTINUED | OUTPATIENT
Start: 2020-08-09 | End: 2020-08-11

## 2020-08-09 NOTE — PROGRESS NOTES
[facility]  OB/GYN: Antepartum Progress Note     SUBJECTIVE:  Pt is a 38year old  female at 26w3d who was admitted on 2020 for elevated bp r/o superimposed preeclampsia. Fetal Movement: y. Vaginal Bleeding: n.  Contractions: soledad Page Pregnancy     Poor fetal growth affecting management of mother in second trimester     Hypertension affecting pregnancy in second trimester     Pregnancy-induced hypertension in second trimester    Discussion with Dr Nisreen Marie.   Currently collecting urine for

## 2020-08-10 ENCOUNTER — ANESTHESIA EVENT (OUTPATIENT)
Dept: OBGYN UNIT | Facility: HOSPITAL | Age: 35
End: 2020-08-10
Payer: COMMERCIAL

## 2020-08-10 ENCOUNTER — ANESTHESIA (OUTPATIENT)
Dept: OBGYN UNIT | Facility: HOSPITAL | Age: 35
End: 2020-08-10
Payer: COMMERCIAL

## 2020-08-10 LAB
ALBUMIN SERPL-MCNC: 2.1 G/DL (ref 3.4–5)
ALBUMIN/GLOB SERPL: 0.6 {RATIO} (ref 1–2)
ALP LIVER SERPL-CCNC: 91 U/L (ref 37–98)
ALT SERPL-CCNC: 284 U/L (ref 13–56)
ANION GAP SERPL CALC-SCNC: 5 MMOL/L (ref 0–18)
AST SERPL-CCNC: 281 U/L (ref 15–37)
BASOPHILS # BLD AUTO: 0.03 X10(3) UL (ref 0–0.2)
BASOPHILS NFR BLD AUTO: 0.2 %
BILIRUB SERPL-MCNC: 0.2 MG/DL (ref 0.1–2)
BUN BLD-MCNC: 13 MG/DL (ref 7–18)
BUN/CREAT SERPL: 20 (ref 10–20)
CALCIUM BLD-MCNC: 8.5 MG/DL (ref 8.5–10.1)
CHLORIDE SERPL-SCNC: 107 MMOL/L (ref 98–112)
CO2 SERPL-SCNC: 23 MMOL/L (ref 21–32)
CREAT BLD-MCNC: 0.65 MG/DL (ref 0.55–1.02)
CREAT UR-SCNC: 40.6 MG/DL
DEPRECATED RDW RBC AUTO: 41 FL (ref 35.1–46.3)
EOSINOPHIL # BLD AUTO: 0.16 X10(3) UL (ref 0–0.7)
EOSINOPHIL NFR BLD AUTO: 1.1 %
ERYTHROCYTE [DISTWIDTH] IN BLOOD BY AUTOMATED COUNT: 13.5 % (ref 11–15)
GLOBULIN PLAS-MCNC: 3.7 G/DL (ref 2.8–4.4)
GLUCOSE BLD-MCNC: 88 MG/DL (ref 70–99)
HCT VFR BLD AUTO: 35.5 % (ref 35–48)
HGB BLD-MCNC: 11.7 G/DL (ref 12–16)
IMM GRANULOCYTES # BLD AUTO: 0.07 X10(3) UL (ref 0–1)
IMM GRANULOCYTES NFR BLD: 0.5 %
LYMPHOCYTES # BLD AUTO: 2.39 X10(3) UL (ref 1–4)
LYMPHOCYTES NFR BLD AUTO: 16.1 %
M PROTEIN MFR SERPL ELPH: 5.8 G/DL (ref 6.4–8.2)
MCH RBC QN AUTO: 27.7 PG (ref 26–34)
MCHC RBC AUTO-ENTMCNC: 33 G/DL (ref 31–37)
MCV RBC AUTO: 83.9 FL (ref 80–100)
MONOCYTES # BLD AUTO: 1.26 X10(3) UL (ref 0.1–1)
MONOCYTES NFR BLD AUTO: 8.5 %
NEUTROPHILS # BLD AUTO: 10.92 X10 (3) UL (ref 1.5–7.7)
NEUTROPHILS # BLD AUTO: 10.92 X10(3) UL (ref 1.5–7.7)
NEUTROPHILS NFR BLD AUTO: 73.6 %
OSMOLALITY SERPL CALC.SUM OF ELEC: 280 MOSM/KG (ref 275–295)
PLATELET # BLD AUTO: 137 10(3)UL (ref 150–450)
POTASSIUM SERPL-SCNC: 4.6 MMOL/L (ref 3.5–5.1)
PROT UR-MCNC: 534.5 MG/DL
PROT/CREAT UR-RTO: 13.17
RBC # BLD AUTO: 4.23 X10(6)UL (ref 3.8–5.3)
SODIUM SERPL-SCNC: 135 MMOL/L (ref 136–145)
WBC # BLD AUTO: 14.8 X10(3) UL (ref 4–11)

## 2020-08-10 PROCEDURE — 59514 CESAREAN DELIVERY ONLY: CPT | Performed by: OBSTETRICS & GYNECOLOGY

## 2020-08-10 PROCEDURE — 59025 FETAL NON-STRESS TEST: CPT | Performed by: OBSTETRICS & GYNECOLOGY

## 2020-08-10 PROCEDURE — 99231 SBSQ HOSP IP/OBS SF/LOW 25: CPT | Performed by: OBSTETRICS & GYNECOLOGY

## 2020-08-10 RX ORDER — HYDROCODONE BITARTRATE AND ACETAMINOPHEN 5; 325 MG/1; MG/1
1 TABLET ORAL EVERY 4 HOURS PRN
Status: DISCONTINUED | OUTPATIENT
Start: 2020-08-10 | End: 2020-08-16

## 2020-08-10 RX ORDER — HYDROMORPHONE HYDROCHLORIDE 1 MG/ML
0.4 INJECTION, SOLUTION INTRAMUSCULAR; INTRAVENOUS; SUBCUTANEOUS EVERY 2 HOUR PRN
Status: ACTIVE | OUTPATIENT
Start: 2020-08-10 | End: 2020-08-11

## 2020-08-10 RX ORDER — BUPIVACAINE HYDROCHLORIDE 7.5 MG/ML
INJECTION, SOLUTION INTRASPINAL AS NEEDED
Status: DISCONTINUED | OUTPATIENT
Start: 2020-08-10 | End: 2020-08-10 | Stop reason: SURG

## 2020-08-10 RX ORDER — IBUPROFEN 600 MG/1
600 TABLET ORAL EVERY 6 HOURS
Status: DISCONTINUED | OUTPATIENT
Start: 2020-08-11 | End: 2020-08-16

## 2020-08-10 RX ORDER — KETOROLAC TROMETHAMINE 30 MG/ML
INJECTION, SOLUTION INTRAMUSCULAR; INTRAVENOUS
Status: COMPLETED
Start: 2020-08-10 | End: 2020-08-10

## 2020-08-10 RX ORDER — MORPHINE SULFATE 0.5 MG/ML
INJECTION, SOLUTION EPIDURAL; INTRATHECAL; INTRAVENOUS AS NEEDED
Status: DISCONTINUED | OUTPATIENT
Start: 2020-08-10 | End: 2020-08-10 | Stop reason: SURG

## 2020-08-10 RX ORDER — KETOROLAC TROMETHAMINE 30 MG/ML
30 INJECTION, SOLUTION INTRAMUSCULAR; INTRAVENOUS EVERY 6 HOURS PRN
Status: ACTIVE | OUTPATIENT
Start: 2020-08-10 | End: 2020-08-11

## 2020-08-10 RX ORDER — CALCIUM GLUCONATE 94 MG/ML
1 INJECTION, SOLUTION INTRAVENOUS ONCE AS NEEDED
Status: DISCONTINUED | OUTPATIENT
Start: 2020-08-10 | End: 2020-08-16

## 2020-08-10 RX ORDER — METOCLOPRAMIDE HYDROCHLORIDE 5 MG/ML
10 INJECTION INTRAMUSCULAR; INTRAVENOUS EVERY 6 HOURS PRN
Status: DISCONTINUED | OUTPATIENT
Start: 2020-08-10 | End: 2020-08-16

## 2020-08-10 RX ORDER — MEPERIDINE HYDROCHLORIDE 25 MG/ML
12.5 INJECTION INTRAMUSCULAR; INTRAVENOUS; SUBCUTANEOUS ONCE AS NEEDED
Status: ACTIVE | OUTPATIENT
Start: 2020-08-10 | End: 2020-08-10

## 2020-08-10 RX ORDER — MORPHINE SULFATE 0.5 MG/ML
INJECTION, SOLUTION EPIDURAL; INTRATHECAL; INTRAVENOUS
Status: DISPENSED
Start: 2020-08-10 | End: 2020-08-11

## 2020-08-10 RX ORDER — HYDROMORPHONE HYDROCHLORIDE 1 MG/ML
0.4 INJECTION, SOLUTION INTRAMUSCULAR; INTRAVENOUS; SUBCUTANEOUS EVERY 5 MIN PRN
Status: DISCONTINUED | OUTPATIENT
Start: 2020-08-10 | End: 2020-08-11 | Stop reason: HOSPADM

## 2020-08-10 RX ORDER — NALOXONE HYDROCHLORIDE 0.4 MG/ML
0.08 INJECTION, SOLUTION INTRAMUSCULAR; INTRAVENOUS; SUBCUTANEOUS
Status: ACTIVE | OUTPATIENT
Start: 2020-08-10 | End: 2020-08-11

## 2020-08-10 RX ORDER — CEFAZOLIN SODIUM/WATER 2 G/20 ML
SYRINGE (ML) INTRAVENOUS
Status: DISPENSED
Start: 2020-08-10 | End: 2020-08-11

## 2020-08-10 RX ORDER — ONDANSETRON 2 MG/ML
4 INJECTION INTRAMUSCULAR; INTRAVENOUS EVERY 6 HOURS PRN
Status: DISCONTINUED | OUTPATIENT
Start: 2020-08-10 | End: 2020-08-16

## 2020-08-10 RX ORDER — HYDRALAZINE HYDROCHLORIDE 20 MG/ML
5 INJECTION INTRAMUSCULAR; INTRAVENOUS ONCE
Status: COMPLETED | OUTPATIENT
Start: 2020-08-10 | End: 2020-08-10

## 2020-08-10 RX ORDER — HYDROCODONE BITARTRATE AND ACETAMINOPHEN 10; 325 MG/1; MG/1
1 TABLET ORAL EVERY 4 HOURS PRN
Status: DISCONTINUED | OUTPATIENT
Start: 2020-08-10 | End: 2020-08-16

## 2020-08-10 RX ORDER — DIPHENHYDRAMINE HYDROCHLORIDE 50 MG/ML
12.5 INJECTION INTRAMUSCULAR; INTRAVENOUS EVERY 4 HOURS PRN
Status: DISCONTINUED | OUTPATIENT
Start: 2020-08-10 | End: 2020-08-16

## 2020-08-10 RX ORDER — HYDRALAZINE HYDROCHLORIDE 20 MG/ML
INJECTION INTRAMUSCULAR; INTRAVENOUS
Status: DISPENSED
Start: 2020-08-10 | End: 2020-08-11

## 2020-08-10 RX ORDER — SIMETHICONE 80 MG
80 TABLET,CHEWABLE ORAL 3 TIMES DAILY PRN
Status: DISCONTINUED | OUTPATIENT
Start: 2020-08-10 | End: 2020-08-16

## 2020-08-10 RX ORDER — SODIUM CHLORIDE, SODIUM LACTATE, POTASSIUM CHLORIDE, CALCIUM CHLORIDE 600; 310; 30; 20 MG/100ML; MG/100ML; MG/100ML; MG/100ML
INJECTION, SOLUTION INTRAVENOUS CONTINUOUS
Status: DISCONTINUED | OUTPATIENT
Start: 2020-08-10 | End: 2020-08-16

## 2020-08-10 RX ORDER — DOCUSATE SODIUM 100 MG/1
100 CAPSULE, LIQUID FILLED ORAL
Status: DISCONTINUED | OUTPATIENT
Start: 2020-08-11 | End: 2020-08-16

## 2020-08-10 RX ORDER — ONDANSETRON 2 MG/ML
4 INJECTION INTRAMUSCULAR; INTRAVENOUS EVERY 6 HOURS PRN
Status: DISCONTINUED | OUTPATIENT
Start: 2020-08-10 | End: 2020-08-10

## 2020-08-10 RX ORDER — DIPHENHYDRAMINE HCL 25 MG
25 CAPSULE ORAL EVERY 4 HOURS PRN
Status: DISCONTINUED | OUTPATIENT
Start: 2020-08-10 | End: 2020-08-16

## 2020-08-10 RX ORDER — TRISODIUM CITRATE DIHYDRATE AND CITRIC ACID MONOHYDRATE 500; 334 MG/5ML; MG/5ML
SOLUTION ORAL
Status: DISCONTINUED
Start: 2020-08-10 | End: 2020-08-10 | Stop reason: WASHOUT

## 2020-08-10 RX ORDER — ONDANSETRON 2 MG/ML
4 INJECTION INTRAMUSCULAR; INTRAVENOUS ONCE AS NEEDED
Status: ACTIVE | OUTPATIENT
Start: 2020-08-10 | End: 2020-08-10

## 2020-08-10 RX ORDER — CEFAZOLIN SODIUM/WATER 2 G/20 ML
2 SYRINGE (ML) INTRAVENOUS ONCE
Status: COMPLETED | OUTPATIENT
Start: 2020-08-10 | End: 2020-08-10

## 2020-08-10 RX ORDER — KETOROLAC TROMETHAMINE 30 MG/ML
30 INJECTION, SOLUTION INTRAMUSCULAR; INTRAVENOUS ONCE AS NEEDED
Status: COMPLETED | OUTPATIENT
Start: 2020-08-10 | End: 2020-08-10

## 2020-08-10 RX ORDER — TRISODIUM CITRATE DIHYDRATE AND CITRIC ACID MONOHYDRATE 500; 334 MG/5ML; MG/5ML
SOLUTION ORAL
Status: COMPLETED
Start: 2020-08-10 | End: 2020-08-10

## 2020-08-10 RX ORDER — BISACODYL 10 MG
10 SUPPOSITORY, RECTAL RECTAL
Status: DISCONTINUED | OUTPATIENT
Start: 2020-08-10 | End: 2020-08-16

## 2020-08-10 RX ORDER — EPHEDRINE SULFATE 50 MG/ML
INJECTION, SOLUTION INTRAVENOUS AS NEEDED
Status: DISCONTINUED | OUTPATIENT
Start: 2020-08-10 | End: 2020-08-10 | Stop reason: SURG

## 2020-08-10 RX ORDER — KETOROLAC TROMETHAMINE 30 MG/ML
30 INJECTION, SOLUTION INTRAMUSCULAR; INTRAVENOUS EVERY 6 HOURS
Status: DISPENSED | OUTPATIENT
Start: 2020-08-10 | End: 2020-08-11

## 2020-08-10 RX ORDER — PHENYLEPHRINE HCL 10 MG/ML
VIAL (ML) INJECTION AS NEEDED
Status: DISCONTINUED | OUTPATIENT
Start: 2020-08-10 | End: 2020-08-10 | Stop reason: SURG

## 2020-08-10 RX ORDER — MORPHINE SULFATE 0.5 MG/ML
0.15 INJECTION, SOLUTION EPIDURAL; INTRATHECAL; INTRAVENOUS ONCE
Status: DISCONTINUED | OUTPATIENT
Start: 2020-08-10 | End: 2020-08-16

## 2020-08-10 RX ORDER — DIPHENHYDRAMINE HYDROCHLORIDE 50 MG/ML
25 INJECTION INTRAMUSCULAR; INTRAVENOUS ONCE AS NEEDED
Status: ACTIVE | OUTPATIENT
Start: 2020-08-10 | End: 2020-08-10

## 2020-08-10 RX ORDER — ONDANSETRON 2 MG/ML
INJECTION INTRAMUSCULAR; INTRAVENOUS
Status: DISPENSED
Start: 2020-08-10 | End: 2020-08-11

## 2020-08-10 RX ORDER — METOCLOPRAMIDE HYDROCHLORIDE 5 MG/ML
INJECTION INTRAMUSCULAR; INTRAVENOUS AS NEEDED
Status: DISCONTINUED | OUTPATIENT
Start: 2020-08-10 | End: 2020-08-10 | Stop reason: SURG

## 2020-08-10 RX ADMIN — PHENYLEPHRINE HCL 100 MCG: 10 MG/ML VIAL (ML) INJECTION at 21:46:00

## 2020-08-10 RX ADMIN — PHENYLEPHRINE HCL 100 MCG: 10 MG/ML VIAL (ML) INJECTION at 21:38:00

## 2020-08-10 RX ADMIN — PHENYLEPHRINE HCL 100 MCG: 10 MG/ML VIAL (ML) INJECTION at 21:52:00

## 2020-08-10 RX ADMIN — METOCLOPRAMIDE HYDROCHLORIDE 10 MG: 5 INJECTION INTRAMUSCULAR; INTRAVENOUS at 21:24:00

## 2020-08-10 RX ADMIN — PHENYLEPHRINE HCL 100 MCG: 10 MG/ML VIAL (ML) INJECTION at 21:56:00

## 2020-08-10 RX ADMIN — PHENYLEPHRINE HCL 50 MCG: 10 MG/ML VIAL (ML) INJECTION at 21:48:00

## 2020-08-10 RX ADMIN — MORPHINE SULFATE 0.15 MG: 0.5 INJECTION, SOLUTION EPIDURAL; INTRATHECAL; INTRAVENOUS at 21:28:00

## 2020-08-10 RX ADMIN — PHENYLEPHRINE HCL 100 MCG: 10 MG/ML VIAL (ML) INJECTION at 22:09:00

## 2020-08-10 RX ADMIN — BUPIVACAINE HYDROCHLORIDE 1.6 ML: 7.5 INJECTION, SOLUTION INTRASPINAL at 21:28:00

## 2020-08-10 RX ADMIN — PHENYLEPHRINE HCL 50 MCG: 10 MG/ML VIAL (ML) INJECTION at 22:04:00

## 2020-08-10 RX ADMIN — PHENYLEPHRINE HCL 50 MCG: 10 MG/ML VIAL (ML) INJECTION at 22:07:00

## 2020-08-10 RX ADMIN — SODIUM CHLORIDE, SODIUM LACTATE, POTASSIUM CHLORIDE, CALCIUM CHLORIDE: 600; 310; 30; 20 INJECTION, SOLUTION INTRAVENOUS at 22:40:00

## 2020-08-10 RX ADMIN — EPHEDRINE SULFATE 10 MG: 50 INJECTION, SOLUTION INTRAVENOUS at 22:00:00

## 2020-08-10 RX ADMIN — EPHEDRINE SULFATE 10 MG: 50 INJECTION, SOLUTION INTRAVENOUS at 22:19:00

## 2020-08-10 RX ADMIN — CEFAZOLIN SODIUM/WATER 2 G: 2 G/20 ML SYRINGE (ML) INTRAVENOUS at 21:35:00

## 2020-08-10 RX ADMIN — PHENYLEPHRINE HCL 100 MCG: 10 MG/ML VIAL (ML) INJECTION at 21:57:00

## 2020-08-10 RX ADMIN — PHENYLEPHRINE HCL 100 MCG: 10 MG/ML VIAL (ML) INJECTION at 21:33:00

## 2020-08-10 RX ADMIN — PHENYLEPHRINE HCL 50 MCG: 10 MG/ML VIAL (ML) INJECTION at 21:29:00

## 2020-08-10 RX ADMIN — SODIUM CHLORIDE, SODIUM LACTATE, POTASSIUM CHLORIDE, CALCIUM CHLORIDE: 600; 310; 30; 20 INJECTION, SOLUTION INTRAVENOUS at 21:49:00

## 2020-08-10 NOTE — PROGRESS NOTES
[facility]  Obstetrics and Gynecology    OB/GYN: Antepartum Progress Note     SUBJECTIVE:  Patient is a 38year old  female at 30w1d admitted for [facility] with [address] [facility]. Patient reports feeling well. No HA or RUQ pain.           OBJECTIVE:  Patient

## 2020-08-10 NOTE — PAYOR COMM NOTE
--------------  CONTINUED STAY REVIEW    Payor: [facility]  Subscriber #:  [id number]  Authorization Number: [id number]    Admit date: 8/4/20  Admit time: 2123    Admitting Physician: Randa Guillen MD  Attending Physician:  Randa Guillen MD  Primary Care P

## 2020-08-10 NOTE — CONSULTS
[facility]    Maternal Fetal Medicine Progress Note    Miller Rene Patient Status:  Inpatient    1985 MRN [id number]   [facility] 1SW-J Attending Mraiah Roberts MD   Hosp Day # 6 PCP Trey Aguilera MD     SUBJECTIVE:  No symptoms

## 2020-08-11 LAB
ALBUMIN SERPL-MCNC: 1.9 G/DL (ref 3.4–5)
ALBUMIN/GLOB SERPL: 0.6 {RATIO} (ref 1–2)
ALP LIVER SERPL-CCNC: 85 U/L (ref 37–98)
ALT SERPL-CCNC: 261 U/L (ref 13–56)
ANION GAP SERPL CALC-SCNC: 6 MMOL/L (ref 0–18)
AST SERPL-CCNC: 232 U/L (ref 15–37)
BASOPHILS # BLD AUTO: 0.03 X10(3) UL (ref 0–0.2)
BASOPHILS NFR BLD AUTO: 0.2 %
BILIRUB SERPL-MCNC: 0.3 MG/DL (ref 0.1–2)
BUN BLD-MCNC: 15 MG/DL (ref 7–18)
BUN/CREAT SERPL: 18.1 (ref 10–20)
CALCIUM BLD-MCNC: 7.8 MG/DL (ref 8.5–10.1)
CHLORIDE SERPL-SCNC: 105 MMOL/L (ref 98–112)
CO2 SERPL-SCNC: 24 MMOL/L (ref 21–32)
CREAT BLD-MCNC: 0.83 MG/DL (ref 0.55–1.02)
DEPRECATED RDW RBC AUTO: 42.2 FL (ref 35.1–46.3)
EOSINOPHIL # BLD AUTO: 0.03 X10(3) UL (ref 0–0.7)
EOSINOPHIL NFR BLD AUTO: 0.2 %
ERYTHROCYTE [DISTWIDTH] IN BLOOD BY AUTOMATED COUNT: 13.7 % (ref 11–15)
GLOBULIN PLAS-MCNC: 3.2 G/DL (ref 2.8–4.4)
GLUCOSE BLD-MCNC: 102 MG/DL (ref 70–99)
HAV IGM SER QL: 6.2 MG/DL (ref 1.6–2.6)
HAV IGM SER QL: 7.1 MG/DL (ref 1.6–2.6)
HAV IGM SER QL: 7.7 MG/DL (ref 1.6–2.6)
HCT VFR BLD AUTO: 33 % (ref 35–48)
HGB BLD-MCNC: 10.7 G/DL (ref 12–16)
HIV 1+2 AB+HIV1 P24 AG SERPL QL IA: NONREACTIVE
IMM GRANULOCYTES # BLD AUTO: 0.1 X10(3) UL (ref 0–1)
IMM GRANULOCYTES NFR BLD: 0.6 %
LYMPHOCYTES # BLD AUTO: 2.13 X10(3) UL (ref 1–4)
LYMPHOCYTES NFR BLD AUTO: 13.1 %
M PROTEIN MFR SERPL ELPH: 5.1 G/DL (ref 6.4–8.2)
MCH RBC QN AUTO: 28 PG (ref 26–34)
MCHC RBC AUTO-ENTMCNC: 32.4 G/DL (ref 31–37)
MCV RBC AUTO: 86.4 FL (ref 80–100)
MONOCYTES # BLD AUTO: 1.24 X10(3) UL (ref 0.1–1)
MONOCYTES NFR BLD AUTO: 7.6 %
NEUTROPHILS # BLD AUTO: 12.69 X10 (3) UL (ref 1.5–7.7)
NEUTROPHILS # BLD AUTO: 12.69 X10(3) UL (ref 1.5–7.7)
NEUTROPHILS NFR BLD AUTO: 78.3 %
OSMOLALITY SERPL CALC.SUM OF ELEC: 281 MOSM/KG (ref 275–295)
PLATELET # BLD AUTO: 122 10(3)UL (ref 150–450)
POTASSIUM SERPL-SCNC: 4.8 MMOL/L (ref 3.5–5.1)
RBC # BLD AUTO: 3.82 X10(6)UL (ref 3.8–5.3)
SODIUM SERPL-SCNC: 135 MMOL/L (ref 136–145)
WBC # BLD AUTO: 16.2 X10(3) UL (ref 4–11)

## 2020-08-11 RX ORDER — LABETALOL 200 MG/1
200 TABLET, FILM COATED ORAL EVERY 12 HOURS SCHEDULED
Status: DISCONTINUED | OUTPATIENT
Start: 2020-08-11 | End: 2020-08-11

## 2020-08-11 NOTE — PROGRESS NOTES
[facility]    Demetra Vanessa Patient Status:  Inpatient   Age/Gender 38year old female MRN [id number]   Location  [facility] Attending Dulce Nunez MD   Hosp Day # 7 PCP Spring Morales MD      Anesthes

## 2020-08-11 NOTE — CM/SW NOTE
Team rounds done on patient. Team reviewed patient plan of care, patient orders, and possible discharge needs. Rounding team members:ARTURO Sullivan, Behavioral Health; Arnold Patel RN Case Manager; RN caring for patient.  Patient delivered 28 week old, who

## 2020-08-11 NOTE — ANESTHESIA PROCEDURE NOTES
Spinal Block  Performed by: Dori Hernandez MD  Authorized by: Dori Hernandez MD       General Information and Staff    Start Time:  8/10/2020 9:24 PM  End Time:  8/10/2020 9:28 PM  Anesthesiologist:  Dori Hernandez MD  Performed by:   Anesthesiologist  Patient

## 2020-08-11 NOTE — CONSULTS
[facility]    Maternal Fetal Medicine Progress Note    [place] Settler Patient Status:  Inpatient    1985 MRN [id number]   Location [address] [facility] Attending Madi Mead MD   Hosp Day # 7 PCP Jeevan Yu MD     SUBJECTIVE: LFT  · pre-eclampsia    RECOMENDATIONS:   · Continue magnesium sulfate until she is 24 hours of delivery  · Agree with antihypertensive therapy at this time  · Routine postpartum/postop management     Questions/concerns were discussed with patient and/or f

## 2020-08-11 NOTE — ANESTHESIA PREPROCEDURE EVALUATION
PRE-OP EVALUATION    Patient Name: Patrick Main    Pre-op Diagnosis: [address]    Procedure(s):     Section    Surgeon(s) and Role:     * Rhiannon White MD - Primary     * Janie Pizano MD - Assisting Surgeon    Pre-op vitals reviewed.   Temp: 99 °F (37.2 Application, 1 Application, Topical, TID PRN  hydrocortisone 1 % cream, , Topical, TID PRN  [COMPLETED] hydrALAzine HCl (APRESOLINE) injection 5 mg, 5 mg, Intravenous, Once  [] hydrALAzine HCl (APRESOLINE) injection 5 mg, 5 mg, Intravenous, Once PRN Patient has no known allergies. Anesthesia Evaluation    Patient summary reviewed.     Anesthetic Complications  (-) history of anesthetic complications         GI/Hepatic/Renal                (+) liver disease (Rapidly elevating liver enzymes) regular  Rate: normal     Dental    No notable dental history. Pulmonary    Pulmonary exam normal.  Breath sounds clear to auscultation bilaterally.                Other findings            ASA: 3   Plan: spinal           Comment: Risks related to c

## 2020-08-11 NOTE — PROGRESS NOTES
Late entry patient seen this morning     [facility]  Obstetrics and Gynecology    OB/GYN: Postpartum Progress Note     SUBJECTIVE:  Patient is a 38year old  female who is s/p PCS. She is POD# 1. Doing well.   Denies fever, chills, N, V, Continue routine postpartum care  Vitals per routine   Encourage ambulation and IS use   HELLP Syndrome, continue magnesium sulfate for 24 hours postpartum. Continue to monitor for signs/symptoms of magnesium toxicity and pre eclampsia/HELLP.  Continue bl

## 2020-08-11 NOTE — OPERATIVE REPORT
[facility]  Obstetrics and Gynecology   Section - Operative Note    Alphonse Settler Patient Status:  Inpatient    1985 MRN [id number]   Location [address] [facility] Attending Madi Mead MD   [address] Day # 6 PCP Jeevan Yu, neonatology who was present for delivery. The placenta was delivered intact with a 3 vessel cord. The uterine cavity was swept clean using a wet lap. The hysterotomy was closed in 3 layers using 0-Vicryl sutures x 2, then 3-0 vicryl for serosa.  Good hemos

## 2020-08-11 NOTE — CM/SW NOTE
met with father of infant girl in NICU.  reviewed insurance and PCP for infant. Father, Domingo Castanon, stated that infant will start on mothers insurance that infant delivered under, [facility] PPO.  Chad stated that he just started with [place]

## 2020-08-11 NOTE — ANESTHESIA POSTPROCEDURE EVALUATION
[facility] Patient Status:  Inpatient   Age/Gender 38year old female MRN [id number]   Location [facility] Attending Diane Pederson, [address] [facility] [address] Day # 6 PCP Milton Dudley MD       Anesthesia Post-op Note    Procedur

## 2020-08-11 NOTE — PROGRESS NOTES
I got called by RN regarding elevated BP in severe range. First dose of IV hydralazine was given, with some response, however, not down to mild range. Another dose was given after discussion with MFM  - went down to mild range with second dose.    Pt fe 87 —   08/10/20 1854 (!) 174/107 — — — —   08/10/20 1800 (!) 191/104 — — 83 —   08/10/20 1736 (!) 163/95 — — 83 —   08/10/20 1715 (!) 165/100 — — 87 —   08/10/20 1518 (!) 146/91 — — 88 —   08/10/20 1429 (!) 180/108 — — 91 —   08/10/20 1407 (!) 168/88 98.4

## 2020-08-12 LAB
ALBUMIN SERPL-MCNC: 1.9 G/DL (ref 3.4–5)
ALBUMIN/GLOB SERPL: 0.5 {RATIO} (ref 1–2)
ALP LIVER SERPL-CCNC: 86 U/L (ref 37–98)
ALT SERPL-CCNC: 163 U/L (ref 13–56)
ANION GAP SERPL CALC-SCNC: 5 MMOL/L (ref 0–18)
AST SERPL-CCNC: 72 U/L (ref 15–37)
BASOPHILS # BLD AUTO: 0.02 X10(3) UL (ref 0–0.2)
BASOPHILS NFR BLD AUTO: 0.2 %
BILIRUB SERPL-MCNC: 0.3 MG/DL (ref 0.1–2)
BUN BLD-MCNC: 16 MG/DL (ref 7–18)
BUN/CREAT SERPL: 15.7 (ref 10–20)
CALCIUM BLD-MCNC: 7 MG/DL (ref 8.5–10.1)
CHLORIDE SERPL-SCNC: 106 MMOL/L (ref 98–112)
CO2 SERPL-SCNC: 23 MMOL/L (ref 21–32)
CREAT BLD-MCNC: 1.02 MG/DL (ref 0.55–1.02)
CREATININE, URINE - PER VOLUME: 50 MG/DL
DEPRECATED RDW RBC AUTO: 44.2 FL (ref 35.1–46.3)
EOSINOPHIL # BLD AUTO: 0.17 X10(3) UL (ref 0–0.7)
EOSINOPHIL NFR BLD AUTO: 1.3 %
ERYTHROCYTE [DISTWIDTH] IN BLOOD BY AUTOMATED COUNT: 14.1 % (ref 11–15)
GLOBULIN PLAS-MCNC: 3.6 G/DL (ref 2.8–4.4)
GLUCOSE BLD-MCNC: 113 MG/DL (ref 70–99)
HCT VFR BLD AUTO: 31.5 % (ref 35–48)
HGB BLD-MCNC: 10.1 G/DL (ref 12–16)
IMM GRANULOCYTES # BLD AUTO: 0.07 X10(3) UL (ref 0–1)
IMM GRANULOCYTES NFR BLD: 0.5 %
LYMPHOCYTES # BLD AUTO: 2.01 X10(3) UL (ref 1–4)
LYMPHOCYTES NFR BLD AUTO: 15.1 %
M PROTEIN MFR SERPL ELPH: 5.5 G/DL (ref 6.4–8.2)
MCH RBC QN AUTO: 28.1 PG (ref 26–34)
MCHC RBC AUTO-ENTMCNC: 32.1 G/DL (ref 31–37)
MCV RBC AUTO: 87.5 FL (ref 80–100)
METANEPHRINE, UR- RATIO TO CRT: 180 UG/G CRT
METANEPHRINE, URINE-PER VOLUME: 90 UG/L
MONOCYTES # BLD AUTO: 0.46 X10(3) UL (ref 0.1–1)
MONOCYTES NFR BLD AUTO: 3.5 %
NEUTROPHILS # BLD AUTO: 10.59 X10 (3) UL (ref 1.5–7.7)
NEUTROPHILS # BLD AUTO: 10.59 X10(3) UL (ref 1.5–7.7)
NEUTROPHILS NFR BLD AUTO: 79.4 %
NORMETANEPHRINE, UR-PER VOLUME: 116 UG/L
NORMETANEPHRINE, URINE - RATIO: 232 UG/G CRT
OSMOLALITY SERPL CALC.SUM OF ELEC: 280 MOSM/KG (ref 275–295)
PLATELET # BLD AUTO: 114 10(3)UL (ref 150–450)
POTASSIUM SERPL-SCNC: 3.8 MMOL/L (ref 3.5–5.1)
RBC # BLD AUTO: 3.6 X10(6)UL (ref 3.8–5.3)
SODIUM SERPL-SCNC: 134 MMOL/L (ref 136–145)
WBC # BLD AUTO: 13.3 X10(3) UL (ref 4–11)

## 2020-08-12 NOTE — PLAN OF CARE
Problem: POSTPARTUM  Goal: Optimize infant feeding at the breast  Description  INTERVENTIONS:  - Initiate breast feeding within first hour after birth. - Monitor effectiveness of current breast feeding efforts.   - Assess support systems available to mo interactions. - Assess caregiver's emotional status and coping mechanisms. - Encourage caregiver to participate in  daily care. - Assess support systems available to mother/family.  - Provide /case management support as needed.   Taniya Tate

## 2020-08-12 NOTE — PROGRESS NOTES
Assisted up to bathroom with steady gait. Voiding with out difficulty, assisted with crystal care and back to be. hayley well.

## 2020-08-12 NOTE — PROGRESS NOTES
Pt admit to mother/baby unit,  Instructed how to use call light, bed controls, lights, tv phone. Also instructed to call for assistance when needs to get up. Verb understanding of all instructions, call light in reach.

## 2020-08-12 NOTE — CM/SW NOTE
met with Mrs Sera Burton  To review information that  went over with father in the NICU. Mother confirmed all of the information that Mr Sera Burton had given.  Mrs Sera Burton stated that she would like to use [employer] since Mrs Sera Burton

## 2020-08-13 RX ORDER — NIFEDIPINE 10 MG/1
10 CAPSULE ORAL ONCE AS NEEDED
Status: COMPLETED | OUTPATIENT
Start: 2020-08-13 | End: 2020-08-13

## 2020-08-13 NOTE — PROGRESS NOTES
PT STATES WAS FEELING LIGHT HEADED; DENIES HEADACHE, VISUAL CHANGES, VS OBTAINED AND WNL, WILL CONTINUE TO MONITOR

## 2020-08-14 LAB
ALBUMIN SERPL-MCNC: 1.8 G/DL (ref 3.4–5)
ALBUMIN/GLOB SERPL: 0.5 {RATIO} (ref 1–2)
ALP LIVER SERPL-CCNC: 143 U/L (ref 37–98)
ALT SERPL-CCNC: 130 U/L (ref 13–56)
ANION GAP SERPL CALC-SCNC: 5 MMOL/L (ref 0–18)
AST SERPL-CCNC: 64 U/L (ref 15–37)
BASOPHILS # BLD AUTO: 0.05 X10(3) UL (ref 0–0.2)
BASOPHILS NFR BLD AUTO: 0.4 %
BILIRUB SERPL-MCNC: 0.2 MG/DL (ref 0.1–2)
BUN BLD-MCNC: 11 MG/DL (ref 7–18)
BUN/CREAT SERPL: 16.2 (ref 10–20)
CALCIUM BLD-MCNC: 8.5 MG/DL (ref 8.5–10.1)
CHLORIDE SERPL-SCNC: 109 MMOL/L (ref 98–112)
CO2 SERPL-SCNC: 25 MMOL/L (ref 21–32)
CREAT BLD-MCNC: 0.68 MG/DL (ref 0.55–1.02)
DEPRECATED RDW RBC AUTO: 46.6 FL (ref 35.1–46.3)
EOSINOPHIL # BLD AUTO: 0.43 X10(3) UL (ref 0–0.7)
EOSINOPHIL NFR BLD AUTO: 3.5 %
ERYTHROCYTE [DISTWIDTH] IN BLOOD BY AUTOMATED COUNT: 14.3 % (ref 11–15)
GLOBULIN PLAS-MCNC: 3.8 G/DL (ref 2.8–4.4)
GLUCOSE BLD-MCNC: 114 MG/DL (ref 70–99)
HCT VFR BLD AUTO: 26.6 % (ref 35–48)
HGB BLD-MCNC: 8.5 G/DL (ref 12–16)
IMM GRANULOCYTES # BLD AUTO: 0.18 X10(3) UL (ref 0–1)
IMM GRANULOCYTES NFR BLD: 1.4 %
LYMPHOCYTES # BLD AUTO: 2.42 X10(3) UL (ref 1–4)
LYMPHOCYTES NFR BLD AUTO: 19.5 %
M PROTEIN MFR SERPL ELPH: 5.6 G/DL (ref 6.4–8.2)
MCH RBC QN AUTO: 28.9 PG (ref 26–34)
MCHC RBC AUTO-ENTMCNC: 32 G/DL (ref 31–37)
MCV RBC AUTO: 90.5 FL (ref 80–100)
MONOCYTES # BLD AUTO: 1.28 X10(3) UL (ref 0.1–1)
MONOCYTES NFR BLD AUTO: 10.3 %
NEUTROPHILS # BLD AUTO: 8.07 X10 (3) UL (ref 1.5–7.7)
NEUTROPHILS # BLD AUTO: 8.07 X10(3) UL (ref 1.5–7.7)
NEUTROPHILS NFR BLD AUTO: 64.9 %
OSMOLALITY SERPL CALC.SUM OF ELEC: 288 MOSM/KG (ref 275–295)
PLATELET # BLD AUTO: 136 10(3)UL (ref 150–450)
POTASSIUM SERPL-SCNC: 3.9 MMOL/L (ref 3.5–5.1)
RBC # BLD AUTO: 2.94 X10(6)UL (ref 3.8–5.3)
SODIUM SERPL-SCNC: 139 MMOL/L (ref 136–145)
WBC # BLD AUTO: 12.4 X10(3) UL (ref 4–11)

## 2020-08-14 RX ORDER — LABETALOL 200 MG/1
400 TABLET, FILM COATED ORAL EVERY 12 HOURS SCHEDULED
Status: DISCONTINUED | OUTPATIENT
Start: 2020-08-14 | End: 2020-08-14

## 2020-08-14 RX ORDER — NIFEDIPINE 60 MG/1
60 TABLET, EXTENDED RELEASE ORAL DAILY
Status: DISCONTINUED | OUTPATIENT
Start: 2020-08-15 | End: 2020-08-15

## 2020-08-14 RX ORDER — HYDRALAZINE HYDROCHLORIDE 20 MG/ML
10 INJECTION INTRAMUSCULAR; INTRAVENOUS ONCE AS NEEDED
Status: COMPLETED | OUTPATIENT
Start: 2020-08-14 | End: 2020-08-14

## 2020-08-14 RX ORDER — NIFEDIPINE 10 MG/1
10 CAPSULE ORAL ONCE
Status: DISCONTINUED | OUTPATIENT
Start: 2020-08-14 | End: 2020-08-15

## 2020-08-14 RX ORDER — NIFEDIPINE 30 MG/1
30 TABLET, EXTENDED RELEASE ORAL ONCE
Status: COMPLETED | OUTPATIENT
Start: 2020-08-14 | End: 2020-08-14

## 2020-08-14 RX ORDER — HYDRALAZINE HYDROCHLORIDE 20 MG/ML
INJECTION INTRAMUSCULAR; INTRAVENOUS
Status: COMPLETED
Start: 2020-08-14 | End: 2020-08-14

## 2020-08-14 RX ORDER — NIFEDIPINE 60 MG/1
60 TABLET, EXTENDED RELEASE ORAL DAILY
Status: DISCONTINUED | OUTPATIENT
Start: 2020-08-15 | End: 2020-08-14

## 2020-08-14 RX ORDER — HYDRALAZINE HYDROCHLORIDE 20 MG/ML
5 INJECTION INTRAMUSCULAR; INTRAVENOUS ONCE AS NEEDED
Status: COMPLETED | OUTPATIENT
Start: 2020-08-14 | End: 2020-08-14

## 2020-08-14 RX ORDER — NIFEDIPINE 30 MG/1
30 TABLET, EXTENDED RELEASE ORAL DAILY
Status: DISCONTINUED | OUTPATIENT
Start: 2020-08-14 | End: 2020-08-14

## 2020-08-14 NOTE — CM/SW NOTE
SW attempted to meet with pt. RN requested that SW will meet with pt on another day due to pt's elevated blood pressure. Social work to remain available for support or any discharge planning needs due to NICU admission of baby girl, Shilpa Lowe.     Aleja Kearney

## 2020-08-14 NOTE — PROGRESS NOTES
B/p at 2027 = 153/106  B/P at 2047 = 171/111 prescribed 300mg labetalol given at this time.  Will recheck blood pressure in 1 hour and contact physician if still in the high range x2 at that time

## 2020-08-14 NOTE — PLAN OF CARE
Problem: POSTPARTUM  Goal: Long Term Goal:Experiences normal postpartum course  Description  INTERVENTIONS:  - Assess and monitor vital signs and lab values. - Assess fundus and lochia. - Provide ice/sitz baths for perineum discomfort.   - Monitor heali pain/trauma. - Instruct and provide assistance with proper latch. - Review techniques for milk expression (breast pumping) and storage of breast milk. Provide pumping equipment/supplies, instructions and assistance, as needed.   - Encourage rooming-in and needed  - Evaluate psychosocial factors contributing to over-consumption  Outcome: Completed

## 2020-08-14 NOTE — PROGRESS NOTES
[facility]  Post-Partum Caesarean Section Progress Note    Jeannette Margaretarabella Patient Status:  Inpatient    1985 MRN [id number]   [facility] 1SW-J Attending Wlimer Banuelos MD   [address] Day # 8 PCP Jennie Christopher MD     SUBJECTIVE:    Post plan to continue with labetalol 300 mg PO TID and add tonight at 17:00 Procardia 30 mg XL q day. Continue to observe until at least tomorrow.      Nursing communication order placed to page with any BP >160/110 so we can further closely monitor BPs    Davis

## 2020-08-15 LAB
ALBUMIN SERPL-MCNC: 2.1 G/DL
ALBUMIN SERPL-MCNC: 2.1 G/DL (ref 3.4–5)
ALBUMIN/GLOB SERPL: 0.5 {RATIO}
ALBUMIN/GLOB SERPL: 0.5 {RATIO} (ref 1–2)
ALP LIVER SERPL-CCNC: 149 U/L (ref 37–98)
ALP SERPL-CCNC: 149 U/L
ALT SERPL-CCNC: 107 U/L (ref 13–56)
ALT SERPL-CCNC: 107 UNITS/L
ANION GAP SERPL CALC-SCNC: 7 MMOL/L
ANION GAP SERPL CALC-SCNC: 7 MMOL/L (ref 0–18)
AST SERPL-CCNC: 35 U/L (ref 15–37)
AST SERPL-CCNC: 35 UNITS/L
BASOPHILS # BLD AUTO: 0.08 X10(3) UL (ref 0–0.2)
BASOPHILS NFR BLD AUTO: 0.6 %
BILIRUB SERPL-MCNC: 0.2 MG/DL
BILIRUB SERPL-MCNC: 0.2 MG/DL (ref 0.1–2)
BUN BLD-MCNC: 9 MG/DL (ref 7–18)
BUN SERPL-MCNC: 9 MG/DL
BUN/CREAT SERPL: 12.3
BUN/CREAT SERPL: 12.3 (ref 10–20)
CALCIUM BLD-MCNC: 9.3 MG/DL (ref 8.5–10.1)
CALCIUM SERPL-MCNC: 9.3 MG/DL
CHLORIDE SERPL-SCNC: 107 MMOL/L
CHLORIDE SERPL-SCNC: 107 MMOL/L (ref 98–112)
CO2 SERPL-SCNC: 25 MMOL/L
CO2 SERPL-SCNC: 25 MMOL/L (ref 21–32)
CREAT BLD-MCNC: 0.73 MG/DL (ref 0.55–1.02)
CREAT SERPL-MCNC: 0.73 MG/DL
DEPRECATED RDW RBC AUTO: 46.5 FL (ref 35.1–46.3)
EOSINOPHIL # BLD AUTO: 0.64 X10(3) UL (ref 0–0.7)
EOSINOPHIL NFR BLD AUTO: 4.5 %
ERYTHROCYTE [DISTWIDTH] IN BLOOD BY AUTOMATED COUNT: 14.2 % (ref 11–15)
GLOBULIN PLAS-MCNC: 4.4 G/DL (ref 2.8–4.4)
GLOBULIN SER-MCNC: 4.4 G/DL
GLUCOSE BLD-MCNC: 134 MG/DL (ref 70–99)
GLUCOSE SERPL-MCNC: 134 MG/DL
HCT VFR BLD AUTO: 31.6 % (ref 35–48)
HCT VFR BLD CALC: 31.6 %
HGB BLD-MCNC: 10 G/DL
HGB BLD-MCNC: 10 G/DL (ref 12–16)
IMM GRANULOCYTES # BLD AUTO: 0.45 X10(3) UL (ref 0–1)
IMM GRANULOCYTES NFR BLD: 3.2 %
LYMPHOCYTES # BLD AUTO: 3.59 X10(3) UL (ref 1–4)
LYMPHOCYTES NFR BLD AUTO: 25.3 %
M PROTEIN MFR SERPL ELPH: 6.5 G/DL (ref 6.4–8.2)
MCH RBC QN AUTO: 28.7 PG (ref 26–34)
MCHC RBC AUTO-ENTMCNC: 31.6 G/DL (ref 31–37)
MCV RBC AUTO: 90.5 FL (ref 80–100)
MONOCYTES # BLD AUTO: 0.88 X10(3) UL (ref 0.1–1)
MONOCYTES NFR BLD AUTO: 6.2 %
NEUTROPHILS # BLD AUTO: 8.53 X10 (3) UL (ref 1.5–7.7)
NEUTROPHILS # BLD AUTO: 8.53 X10(3) UL (ref 1.5–7.7)
NEUTROPHILS NFR BLD AUTO: 60.2 %
OSMOLALITY SERPL CALC.SUM OF ELEC: 289 MOSM/KG (ref 275–295)
PLATELET # BLD AUTO: 216 10(3)UL (ref 150–450)
PLATELET # BLD: 216 K/MCL
POTASSIUM SERPL-SCNC: 3.6 MMOL/L
POTASSIUM SERPL-SCNC: 3.6 MMOL/L (ref 3.5–5.1)
PROT SERPL-MCNC: 6.5 G/DL
RBC # BLD AUTO: 3.49 X10(6)UL (ref 3.8–5.3)
RBC # BLD: 3.49 10*6/UL
SODIUM SERPL-SCNC: 139 MMOL/L
SODIUM SERPL-SCNC: 139 MMOL/L (ref 136–145)
WBC # BLD AUTO: 14.2 X10(3) UL (ref 4–11)
WBC # BLD: 14.2 K/MCL

## 2020-08-15 PROCEDURE — 99255 IP/OBS CONSLTJ NEW/EST HI 80: CPT | Performed by: INTERNAL MEDICINE

## 2020-08-15 RX ORDER — NIFEDIPINE 10 MG/1
10 CAPSULE ORAL ONCE
Status: COMPLETED | OUTPATIENT
Start: 2020-08-15 | End: 2020-08-15

## 2020-08-15 RX ORDER — NIFEDIPINE 30 MG/1
30 TABLET, EXTENDED RELEASE ORAL DAILY
Status: COMPLETED | OUTPATIENT
Start: 2020-08-15 | End: 2020-08-15

## 2020-08-15 RX ORDER — NIFEDIPINE 10 MG/1
10 CAPSULE ORAL EVERY 4 HOURS PRN
Status: DISCONTINUED | OUTPATIENT
Start: 2020-08-15 | End: 2020-08-16

## 2020-08-15 NOTE — PROGRESS NOTES
[facility]  Obstetrics and Gynecology    OB/GYN: Postpartum Progress Note     SUBJECTIVE:  Patient is a 38year old  female who is s/p RCS. She is POD# 4. Doing well. Denies fever, chills, N, V, chest pain and SOB.  Bleeding has been stab AM  - scheduled for Nifedipine 90 mg ER PO daily, first dose at 9 pm tonight  Plan for discharge to home tomorrow due to blood pressure management   Follow up in 3-4 days for blood pressure check after discharge       Corinne Miller MD   [facility] - OBGYN

## 2020-08-15 NOTE — PROGRESS NOTES
D/w patient elevated blood pressures despite Labetalol 300 mg PO TID and Nifedipine 90 mg ER PO daily (total 90 mg over 24 hours). She was noted to have severely elevated /104 with repeat 157/111. The patient did not have IV access.  Therefore, Nifedi

## 2020-08-15 NOTE — CONSULTS
[facility]  Report of Consultation    Micaela Madison Patient Status:  Inpatient    1985 MRN [id number]   [facility] 1SW-J Attending Destinee Weller MD   [facility] Day # 6 PCP Mary Jo Oswald MD     Reason for Consultation:  Bridget Sims OTHER SURGICAL HISTORY  3/19 & 5/19    partial septum removed x 2   • WISDOM TEETH REMOVED  2011     Family History   Problem Relation Age of Onset   • High Blood Pressure Father    • Other (Other) Maternal Grandmother         osteoporosis   • High Blood P mg, 100 mg, Oral, [email or web address]  •  bisacodyl (DULCOLAX) rectal suppository 10 mg, 10 mg, Rectal, Daily PRN  •  witch hazel-glycerin ( WITCH HAZEL) pad, , Topical, PRN  •  Phenylephrine-Mineral Oil-Pet (FORMULATION R) rectal ointment, , Rectal, Daily PRN wheezes, rales, rhonchi or dullness. Abdomen: Soft, non-tender. Extremities: Without clubbing, cyanosis. Mild bilateral lower extremity edema. Peripheral pulses are 2+, with no lower extremity delay. Neurologic: Alert and oriented, normal affect.   Luis Armando Matt echocardiogram on her in the morning. Further recommendations pending clinical course and results of the above investigations. Discussed in detail with the patient and her . Thank you for asking me to participate in her care.     Linda Young  8/

## 2020-08-16 ENCOUNTER — APPOINTMENT (OUTPATIENT)
Dept: CV DIAGNOSTICS | Facility: HOSPITAL | Age: 35
End: 2020-08-16
Attending: INTERNAL MEDICINE
Payer: COMMERCIAL

## 2020-08-16 VITALS
SYSTOLIC BLOOD PRESSURE: 150 MMHG | HEART RATE: 86 BPM | RESPIRATION RATE: 20 BRPM | HEIGHT: 64.02 IN | OXYGEN SATURATION: 99 % | WEIGHT: 174 LBS | TEMPERATURE: 99 F | DIASTOLIC BLOOD PRESSURE: 101 MMHG | BODY MASS INDEX: 29.71 KG/M2

## 2020-08-16 LAB
ATRIAL RATE: 105 BPM
P AXIS: 49 DEGREES
P-R INTERVAL: 120 MS
Q-T INTERVAL: 330 MS
QRS DURATION: 68 MS
QTC CALCULATION (BEZET): 436 MS
R AXIS: 82 DEGREES
T AXIS: 13 DEGREES
VENTRICULAR RATE: 105 BPM

## 2020-08-16 PROCEDURE — 93306 TTE W/DOPPLER COMPLETE: CPT | Performed by: INTERNAL MEDICINE

## 2020-08-16 PROCEDURE — 99232 SBSQ HOSP IP/OBS MODERATE 35: CPT | Performed by: INTERNAL MEDICINE

## 2020-08-16 RX ORDER — IBUPROFEN 600 MG/1
600 TABLET ORAL EVERY 6 HOURS PRN
Qty: 40 TABLET | Refills: 0 | Status: SHIPPED | OUTPATIENT
Start: 2020-08-16 | End: 2020-08-20

## 2020-08-16 RX ORDER — NIFEDIPINE 90 MG/1
90 TABLET, FILM COATED, EXTENDED RELEASE ORAL DAILY
Qty: 30 TABLET | Refills: 1 | Status: SHIPPED | OUTPATIENT
Start: 2020-08-16 | End: 2020-09-09

## 2020-08-16 RX ORDER — HYDROCODONE BITARTRATE AND ACETAMINOPHEN 5; 325 MG/1; MG/1
1 TABLET ORAL EVERY 6 HOURS PRN
Qty: 24 TABLET | Refills: 0 | Status: SHIPPED | OUTPATIENT
Start: 2020-08-16 | End: 2020-08-20

## 2020-08-16 RX ORDER — LABETALOL 200 MG/1
600 TABLET, FILM COATED ORAL EVERY 12 HOURS SCHEDULED
Status: DISCONTINUED | OUTPATIENT
Start: 2020-08-16 | End: 2020-08-16

## 2020-08-16 RX ORDER — LABETALOL 300 MG/1
600 TABLET, FILM COATED ORAL EVERY 12 HOURS SCHEDULED
Qty: 120 TABLET | Refills: 1 | Status: SHIPPED | OUTPATIENT
Start: 2020-08-16 | End: 2020-09-09 | Stop reason: ALTCHOICE

## 2020-08-16 NOTE — PROGRESS NOTES
[facility]  Obstetrics and Gynecology    OB/GYN: Postpartum Progress Note     SUBJECTIVE:  Patient is a 38year old  female who is s/p RCS. She is POD# 5. Doing well. Denies fever, chills, N, V, chest pain and SOB.  Bleeding has been stab pressure management. Precautions provided.    - pt to receive Labetalol 300 mg PO x 1 dose now   - new regimen: Labetalol 600 mg PO BID and Nifedipine 90 mg PO ER daily   Plan for discharge to home today  Follow up in 3-4 days for blood pressure check

## 2020-08-16 NOTE — PROGRESS NOTES
Discharge instructions discussed and all questions answered. Discharged home in stable condition. Baby to remain in NICU at this time.

## 2020-08-16 NOTE — DISCHARGE SUMMARY
[facility]  Discharge Summary    Demetra Mendez Patient Status:  inpatient    1985 MRN [id number]   Location 1114/1114-A Attending EMG [address] 15 PCP Spring Morales MD     Date of Admission: 2020    Date of Discharge: 20 due to noted severe range blood pressures. She was initiated on oral nifedipine. POD#4, the patient continued to do well and was meeting post operative expectations. Her blood pressures were noted for severely elevated values.  Her two oral medications were

## 2020-08-16 NOTE — PROGRESS NOTES
Cardiology. No problems over night. BP trending down, 150/90 now. Exam without change. EKG: normal.  Echo: Merge reporting system is down. Normal study. A/P- Meka-partum HTN with pre-eclampsia and HELLP syndrome. BP trend is improving.   OK to

## 2020-08-16 NOTE — PROGRESS NOTES
Spoke with Cardiographics department. They will be up shortly to perform EKG and will get in touch with technician to perform echo.

## 2020-08-17 ENCOUNTER — TELEPHONE (OUTPATIENT)
Dept: CARDIOLOGY | Age: 35
End: 2020-08-17

## 2020-08-18 ENCOUNTER — TELEPHONE (OUTPATIENT)
Dept: OBGYN CLINIC | Facility: CLINIC | Age: 35
End: 2020-08-18

## 2020-08-18 ENCOUNTER — TELEPHONE (OUTPATIENT)
Dept: OBGYN UNIT | Facility: HOSPITAL | Age: 35
End: 2020-08-18

## 2020-08-18 NOTE — PROGRESS NOTES
Carolynn Quick Call completed: Mom reports that she and infant are doing well. Infant remains in NICU at 26.7wks   Has an appt w/ OB and cardiologist tomorrow for follow up. No complaints of PPD. Reviewed basic self  care.    Encouraged to follow u

## 2020-08-20 ENCOUNTER — POSTPARTUM (OUTPATIENT)
Dept: OBGYN CLINIC | Facility: CLINIC | Age: 35
End: 2020-08-20

## 2020-08-20 ENCOUNTER — TELEPHONE (OUTPATIENT)
Dept: OBGYN CLINIC | Facility: CLINIC | Age: 35
End: 2020-08-20

## 2020-08-20 VITALS
DIASTOLIC BLOOD PRESSURE: 100 MMHG | HEIGHT: 64 IN | SYSTOLIC BLOOD PRESSURE: 154 MMHG | BODY MASS INDEX: 28.34 KG/M2 | WEIGHT: 166 LBS

## 2020-08-20 DIAGNOSIS — I10 WHITE COAT SYNDROME WITH DIAGNOSIS OF HYPERTENSION: ICD-10-CM

## 2020-08-20 DIAGNOSIS — O11.9 PREECLAMPSIA COMPLICATING HYPERTENSION: ICD-10-CM

## 2020-08-20 PROBLEM — O26.20 HABITUAL ABORTER, ANTEPARTUM: Status: RESOLVED | Noted: 2020-04-07 | Resolved: 2020-08-10

## 2020-08-20 PROBLEM — Z98.891 STATUS POST HYSTEROSCOPIC RESECTION OF UTERINE SEPTUM: Status: RESOLVED | Noted: 2020-05-05 | Resolved: 2020-08-10

## 2020-08-20 PROBLEM — R03.0 WHITE COAT SYNDROME WITHOUT DIAGNOSIS OF HYPERTENSION: Status: RESOLVED | Noted: 2020-05-05 | Resolved: 2020-08-10

## 2020-08-20 PROBLEM — Q52.129 LONGITUDINAL VAGINAL SEPTUM: Status: RESOLVED | Noted: 2019-01-13 | Resolved: 2020-08-10

## 2020-08-20 PROBLEM — O26.20 HABITUAL ABORTER, ANTEPARTUM (HCC): Status: RESOLVED | Noted: 2020-04-07 | Resolved: 2020-08-10

## 2020-08-20 PROBLEM — O09.529 ANTEPARTUM MULTIGRAVIDA OF ADVANCED MATERNAL AGE (HCC): Status: RESOLVED | Noted: 2018-12-26 | Resolved: 2020-08-10

## 2020-08-20 PROBLEM — O09.292 PRIOR PERINATAL LOSS IN SECOND TRIMESTER, ANTEPARTUM (HCC): Status: RESOLVED | Noted: 2020-05-05 | Resolved: 2020-08-10

## 2020-08-20 PROBLEM — D39.11 OVARIAN TUMOR OF BORDERLINE MALIGNANCY, RIGHT: Status: RESOLVED | Noted: 2017-08-18 | Resolved: 2020-08-10

## 2020-08-20 PROBLEM — O09.292 PRIOR PERINATAL LOSS IN SECOND TRIMESTER, ANTEPARTUM: Status: RESOLVED | Noted: 2020-05-05 | Resolved: 2020-08-10

## 2020-08-20 PROBLEM — O09.529 ANTEPARTUM MULTIGRAVIDA OF ADVANCED MATERNAL AGE: Status: RESOLVED | Noted: 2018-12-26 | Resolved: 2020-08-10

## 2020-08-20 PROCEDURE — 1111F DSCHRG MED/CURRENT MED MERGE: CPT | Performed by: OBSTETRICS & GYNECOLOGY

## 2020-08-20 PROCEDURE — 3008F BODY MASS INDEX DOCD: CPT | Performed by: OBSTETRICS & GYNECOLOGY

## 2020-08-20 PROCEDURE — 3077F SYST BP >= 140 MM HG: CPT | Performed by: OBSTETRICS & GYNECOLOGY

## 2020-08-20 PROCEDURE — 3080F DIAST BP >= 90 MM HG: CPT | Performed by: OBSTETRICS & GYNECOLOGY

## 2020-08-20 NOTE — PROGRESS NOTES
[facility]  Obstetrics and Gynecology   Postpartum Progress Note    Subjective:     Elayne Rousseau is a 38year old  female who is s/p CCS at 26 weeks due to HELLP. Antepartum issues: CHTN with IMNOO.        Discharge on 600 mg Labetalol BID

## 2020-08-20 NOTE — TELEPHONE ENCOUNTER
Received FMLA. Pt pd $25.00. please call patient when complete so she can pick them up.  #[phone]  Placed in nurse bin in [place] for completion

## 2020-08-24 ENCOUNTER — MED REC SCAN ONLY (OUTPATIENT)
Dept: OBGYN CLINIC | Facility: CLINIC | Age: 35
End: 2020-08-24

## 2020-08-24 NOTE — TELEPHONE ENCOUNTER
Signed forms received    Copy to scan  Copy to file in [place]    Patient contacted and notified of completion. Desires copy at appt tomorrow.     Copy to desk in [place] for   Note to appt

## 2020-08-25 ENCOUNTER — POSTPARTUM (OUTPATIENT)
Dept: OBGYN CLINIC | Facility: CLINIC | Age: 35
End: 2020-08-25
Payer: COMMERCIAL

## 2020-08-25 VITALS
HEIGHT: 64 IN | SYSTOLIC BLOOD PRESSURE: 118 MMHG | BODY MASS INDEX: 28.06 KG/M2 | HEART RATE: 92 BPM | WEIGHT: 164.38 LBS | DIASTOLIC BLOOD PRESSURE: 70 MMHG

## 2020-08-25 PROCEDURE — 3078F DIAST BP <80 MM HG: CPT | Performed by: OBSTETRICS & GYNECOLOGY

## 2020-08-25 PROCEDURE — 3074F SYST BP LT 130 MM HG: CPT | Performed by: OBSTETRICS & GYNECOLOGY

## 2020-08-25 PROCEDURE — 3008F BODY MASS INDEX DOCD: CPT | Performed by: OBSTETRICS & GYNECOLOGY

## 2020-08-25 NOTE — PROGRESS NOTES
Here for BP check  meds at home:   300 mg PO Labetalol in AM  90 mg Procardia XL at 3 pm   600 mg PO Labetalol in PM     Doing well. Feels light headed after Procardia dose.  Does not feel that Labetalol brings down her BP  She made an adjustment to the john

## 2020-09-03 ENCOUNTER — OFFICE VISIT (OUTPATIENT)
Dept: CARDIOLOGY | Age: 35
End: 2020-09-03

## 2020-09-03 VITALS
SYSTOLIC BLOOD PRESSURE: 118 MMHG | WEIGHT: 160.44 LBS | BODY MASS INDEX: 27.39 KG/M2 | DIASTOLIC BLOOD PRESSURE: 80 MMHG | HEIGHT: 64 IN | HEART RATE: 90 BPM

## 2020-09-03 DIAGNOSIS — I10 BENIGN ESSENTIAL HTN: ICD-10-CM

## 2020-09-03 DIAGNOSIS — Z09 FOLLOW UP: ICD-10-CM

## 2020-09-03 PROCEDURE — 99204 OFFICE O/P NEW MOD 45 MIN: CPT | Performed by: NURSE PRACTITIONER

## 2020-09-03 PROCEDURE — 3074F SYST BP LT 130 MM HG: CPT | Performed by: NURSE PRACTITIONER

## 2020-09-03 PROCEDURE — 3079F DIAST BP 80-89 MM HG: CPT | Performed by: NURSE PRACTITIONER

## 2020-09-03 RX ORDER — LABETALOL 100 MG/1
200 TABLET, FILM COATED ORAL 2 TIMES DAILY
Qty: 120 TABLET | Refills: 3 | Status: SHIPPED | OUTPATIENT
Start: 2020-09-03 | End: 2020-12-16 | Stop reason: SDUPTHER

## 2020-09-03 RX ORDER — LORATADINE 10 MG/1
TABLET ORAL DAILY
COMMUNITY
Start: 2020-04-30

## 2020-09-03 RX ORDER — NIFEDIPINE 90 MG/1
90 TABLET, FILM COATED, EXTENDED RELEASE ORAL DAILY
COMMUNITY
Start: 2020-08-16 | End: 2020-12-16 | Stop reason: ALTCHOICE

## 2020-09-03 RX ORDER — LABETALOL 300 MG/1
300 TABLET, FILM COATED ORAL 2 TIMES DAILY
COMMUNITY
Start: 2020-06-24 | End: 2020-09-03 | Stop reason: SDUPTHER

## 2020-09-03 SDOH — HEALTH STABILITY: PHYSICAL HEALTH: ON AVERAGE, HOW MANY MINUTES DO YOU ENGAGE IN EXERCISE AT THIS LEVEL?: 20 MIN

## 2020-09-03 SDOH — HEALTH STABILITY: PHYSICAL HEALTH: ON AVERAGE, HOW MANY DAYS PER WEEK DO YOU ENGAGE IN MODERATE TO STRENUOUS EXERCISE (LIKE A BRISK WALK)?: 7 DAYS

## 2020-09-03 ASSESSMENT — ENCOUNTER SYMPTOMS
ORTHOPNEA: 0
SNORING: 0
BLURRED VISION: 0
HEMATOCHEZIA: 0
BLOATING: 0
SLEEP DISTURBANCES DUE TO BREATHING: 0
LOSS OF BALANCE: 0
SORE THROAT: 0
NEAR-SYNCOPE: 0
CHILLS: 0
POOR WOUND HEALING: 0
DECREASED APPETITE: 0
HEMOPTYSIS: 0
WEIGHT LOSS: 1
SPUTUM PRODUCTION: 0
FEVER: 0
NAIL CHANGES: 0
DIAPHORESIS: 0
DIFFICULTY WITH CONCENTRATION: 0
SYNCOPE: 0
DIZZINESS: 0
HEADACHES: 0
ABDOMINAL PAIN: 0
PARESTHESIAS: 0
ODYNOPHAGIA: 0
ENDOCRINE NEGATIVE: 1
LIGHT-HEADEDNESS: 0
NIGHT SWEATS: 0
BRUISES/BLEEDS EASILY: 0
HOARSE VOICE: 0
WEAKNESS: 0
ALLERGIC/IMMUNOLOGIC NEGATIVE: 1
CHANGE IN BOWEL HABIT: 0
COUGH: 0
HEARTBURN: 0
SHORTNESS OF BREATH: 0
SEIZURES: 0
EXCESSIVE DAYTIME SLEEPINESS: 0
NUMBNESS: 0

## 2020-09-03 ASSESSMENT — PATIENT HEALTH QUESTIONNAIRE - PHQ9
CLINICAL INTERPRETATION OF PHQ9 SCORE: NO FURTHER SCREENING NEEDED
2. FEELING DOWN, DEPRESSED OR HOPELESS: NOT AT ALL
1. LITTLE INTEREST OR PLEASURE IN DOING THINGS: NOT AT ALL
SUM OF ALL RESPONSES TO PHQ9 QUESTIONS 1 AND 2: 0
SUM OF ALL RESPONSES TO PHQ9 QUESTIONS 1 AND 2: 0
CLINICAL INTERPRETATION OF PHQ2 SCORE: NO FURTHER SCREENING NEEDED

## 2020-09-03 ASSESSMENT — LIFESTYLE VARIABLES: SUBSTANCE_ABUSE: 0

## 2020-09-04 NOTE — TELEPHONE ENCOUNTER
S/p C section 8/10/20. Pt seen in office for incision check on 8/25/20. Next appt 9/24/20. Pt delivered at 26wks due to HELLP syndrome; baby in NICU. Paperwork completed for 8 week leave of absence for C Section.   Pt wanted paperwork to reflect her 1

## 2020-09-04 NOTE — TELEPHONE ENCOUNTER
PT dropped off her FMLA forms that she needs updated to include more weeks of maternity leave    Please call PT to discuss    Forms in clinical in box in [facility] office for review

## 2020-09-09 ENCOUNTER — OFFICE VISIT (OUTPATIENT)
Dept: INTERNAL MEDICINE CLINIC | Facility: CLINIC | Age: 35
End: 2020-09-09
Payer: COMMERCIAL

## 2020-09-09 VITALS
TEMPERATURE: 99 F | SYSTOLIC BLOOD PRESSURE: 136 MMHG | DIASTOLIC BLOOD PRESSURE: 92 MMHG | RESPIRATION RATE: 16 BRPM | OXYGEN SATURATION: 99 % | HEART RATE: 83 BPM

## 2020-09-09 DIAGNOSIS — O14.20 HEMOLYSIS, ELEVATED LIVER ENZYMES, AND LOW PLATELET (HELLP) SYNDROME DURING PREGNANCY, ANTEPARTUM: Primary | ICD-10-CM

## 2020-09-09 DIAGNOSIS — O11.9 CHRONIC HYPERTENSION WITH SUPERIMPOSED PRE-ECLAMPSIA: ICD-10-CM

## 2020-09-09 DIAGNOSIS — D50.9 MICROCYTIC ANEMIA: ICD-10-CM

## 2020-09-09 DIAGNOSIS — D69.6 THROMBOCYTOPENIA (HCC): ICD-10-CM

## 2020-09-09 DIAGNOSIS — I10 HYPERTENSION, UNSPECIFIED TYPE: ICD-10-CM

## 2020-09-09 DIAGNOSIS — E53.8 VITAMIN B12 DEFICIENCY: ICD-10-CM

## 2020-09-09 PROCEDURE — 3075F SYST BP GE 130 - 139MM HG: CPT | Performed by: INTERNAL MEDICINE

## 2020-09-09 PROCEDURE — 3080F DIAST BP >= 90 MM HG: CPT | Performed by: INTERNAL MEDICINE

## 2020-09-09 RX ORDER — NIFEDIPINE 30 MG/1
TABLET, FILM COATED, EXTENDED RELEASE ORAL
Qty: 270 TABLET | Refills: 3 | Status: SHIPPED | OUTPATIENT
Start: 2020-09-09 | End: 2021-12-08 | Stop reason: ALTCHOICE

## 2020-09-09 RX ORDER — LABETALOL 200 MG/1
200 TABLET, FILM COATED ORAL 2 TIMES DAILY
COMMUNITY
End: 2021-12-08 | Stop reason: ALTCHOICE

## 2020-09-09 NOTE — PROGRESS NOTES
eDvi Hale is a 38year old female. Patient presents with: Follow - Up    HPI:     Delivered baby girl at 32 5/7 weeks due to HELLP syndrome. Still titrating BP meds.   Discharged from [facility] last month on labetalol 600mg BID and procardia XL 90mg/ (75.3 kg)  08/07/20 : 174 lb (78.9 kg)  08/04/20 : 173 lb (78.5 kg)  07/27/20 : 169 lb (76.7 kg)    There is no height or weight on file to calculate BMI.       EXAM:   BP (!) 136/92   Pulse 83   Temp 98.7 °F (37.1 °C) (Oral)   Resp 16   LMP 02/09/2020   Sp

## 2020-09-14 ENCOUNTER — LAB ENCOUNTER (OUTPATIENT)
Dept: LAB | Facility: HOSPITAL | Age: 35
End: 2020-09-14
Attending: INTERNAL MEDICINE
Payer: COMMERCIAL

## 2020-09-14 DIAGNOSIS — O14.20 HEMOLYSIS, ELEVATED LIVER ENZYMES, AND LOW PLATELET (HELLP) SYNDROME DURING PREGNANCY, ANTEPARTUM: ICD-10-CM

## 2020-09-14 DIAGNOSIS — D69.6 THROMBOCYTOPENIA (HCC): ICD-10-CM

## 2020-09-14 DIAGNOSIS — E53.8 VITAMIN B12 DEFICIENCY: ICD-10-CM

## 2020-09-14 DIAGNOSIS — I10 HYPERTENSION, UNSPECIFIED TYPE: ICD-10-CM

## 2020-09-14 DIAGNOSIS — D50.9 MICROCYTIC ANEMIA: ICD-10-CM

## 2020-09-14 LAB
ALBUMIN SERPL-MCNC: 3.3 G/DL (ref 3.4–5)
ALBUMIN/GLOB SERPL: 0.8 {RATIO} (ref 1–2)
ALP LIVER SERPL-CCNC: 76 U/L (ref 37–98)
ALT SERPL-CCNC: 37 U/L (ref 13–56)
ALT SERPL-CCNC: 37 UNITS/L
ANION GAP SERPL CALC-SCNC: 3 MMOL/L (ref 0–18)
AST SERPL-CCNC: 23 U/L (ref 15–37)
AST SERPL-CCNC: 23 UNITS/L
BASOPHILS # BLD AUTO: 0.04 X10(3) UL (ref 0–0.2)
BASOPHILS NFR BLD AUTO: 0.5 %
BILIRUB SERPL-MCNC: 0.3 MG/DL (ref 0.1–2)
BUN BLD-MCNC: 11 MG/DL (ref 7–18)
BUN SERPL-MCNC: 11 MG/DL
BUN/CREAT SERPL: 12.8
BUN/CREAT SERPL: 12.8 (ref 10–20)
CALCIUM BLD-MCNC: 9.3 MG/DL (ref 8.5–10.1)
CALCIUM SERPL-MCNC: 9.3 MG/DL
CHLORIDE SERPL-SCNC: 110 MMOL/L
CHLORIDE SERPL-SCNC: 110 MMOL/L (ref 98–112)
CO2 SERPL-SCNC: 27 MMOL/L
CO2 SERPL-SCNC: 27 MMOL/L (ref 21–32)
CREAT BLD-MCNC: 0.86 MG/DL (ref 0.55–1.02)
CREAT SERPL-MCNC: 0.86 MG/DL
DEPRECATED HBV CORE AB SER IA-ACNC: 22.4 NG/ML (ref 12–160)
DEPRECATED RDW RBC AUTO: 37.8 FL (ref 35.1–46.3)
EOSINOPHIL # BLD AUTO: 0.23 X10(3) UL (ref 0–0.7)
EOSINOPHIL NFR BLD AUTO: 3.1 %
ERYTHROCYTE [DISTWIDTH] IN BLOOD BY AUTOMATED COUNT: 11.9 % (ref 11–15)
GLOBULIN PLAS-MCNC: 4.4 G/DL (ref 2.8–4.4)
GLUCOSE BLD-MCNC: 93 MG/DL (ref 70–99)
GLUCOSE SERPL-MCNC: 93 MG/DL
HAV IGM SER QL: 2.2 MG/DL (ref 1.6–2.6)
HCT VFR BLD AUTO: 37.3 % (ref 35–48)
HCT VFR BLD CALC: 37.3 %
HGB BLD-MCNC: 11.7 G/DL
HGB BLD-MCNC: 11.7 G/DL (ref 12–16)
IMM GRANULOCYTES # BLD AUTO: 0.01 X10(3) UL (ref 0–1)
IMM GRANULOCYTES NFR BLD: 0.1 %
IRON SATURATION: 11 % (ref 15–50)
IRON SERPL-MCNC: 45 UG/DL (ref 50–170)
LYMPHOCYTES # BLD AUTO: 2.94 X10(3) UL (ref 1–4)
LYMPHOCYTES NFR BLD AUTO: 39.2 %
M PROTEIN MFR SERPL ELPH: 7.7 G/DL (ref 6.4–8.2)
MCH RBC QN AUTO: 27.3 PG (ref 26–34)
MCHC RBC AUTO-ENTMCNC: 31.4 G/DL (ref 31–37)
MCV RBC AUTO: 87.1 FL (ref 80–100)
MONOCYTES # BLD AUTO: 0.6 X10(3) UL (ref 0.1–1)
MONOCYTES NFR BLD AUTO: 8 %
NEUTROPHILS # BLD AUTO: 3.68 X10 (3) UL (ref 1.5–7.7)
NEUTROPHILS # BLD AUTO: 3.68 X10(3) UL (ref 1.5–7.7)
NEUTROPHILS NFR BLD AUTO: 49.1 %
OSMOLALITY SERPL CALC.SUM OF ELEC: 289 MOSM/KG (ref 275–295)
PATIENT FASTING Y/N/NP: NO
PLATELET # BLD AUTO: 295 10(3)UL (ref 150–450)
PLATELET # BLD: 295 K/MCL
POTASSIUM SERPL-SCNC: 3.8 MMOL/L
POTASSIUM SERPL-SCNC: 3.8 MMOL/L (ref 3.5–5.1)
RBC # BLD AUTO: 4.28 X10(6)UL (ref 3.8–5.3)
RBC # BLD: 4.28 10*6/UL
SODIUM SERPL-SCNC: 140 MMOL/L
SODIUM SERPL-SCNC: 140 MMOL/L (ref 136–145)
TOTAL IRON BINDING CAPACITY: 413 UG/DL (ref 240–450)
TRANSFERRIN SERPL-MCNC: 277 MG/DL (ref 200–360)
TSI SER-ACNC: 0.39 MIU/ML (ref 0.36–3.74)
VIT B12 SERPL-MCNC: 657 PG/ML (ref 193–986)
WBC # BLD AUTO: 7.5 X10(3) UL (ref 4–11)
WBC # BLD: 7.5 K/MCL

## 2020-09-14 PROCEDURE — 85025 COMPLETE CBC W/AUTO DIFF WBC: CPT

## 2020-09-14 PROCEDURE — 82728 ASSAY OF FERRITIN: CPT

## 2020-09-14 PROCEDURE — 84443 ASSAY THYROID STIM HORMONE: CPT | Performed by: INTERNAL MEDICINE

## 2020-09-14 PROCEDURE — 83550 IRON BINDING TEST: CPT

## 2020-09-14 PROCEDURE — 36415 COLL VENOUS BLD VENIPUNCTURE: CPT | Performed by: INTERNAL MEDICINE

## 2020-09-14 PROCEDURE — 82088 ASSAY OF ALDOSTERONE: CPT

## 2020-09-14 PROCEDURE — 83735 ASSAY OF MAGNESIUM: CPT

## 2020-09-14 PROCEDURE — 84244 ASSAY OF RENIN: CPT

## 2020-09-14 PROCEDURE — 83540 ASSAY OF IRON: CPT

## 2020-09-14 PROCEDURE — 80053 COMPREHEN METABOLIC PANEL: CPT

## 2020-09-14 PROCEDURE — 82607 VITAMIN B-12: CPT

## 2020-09-15 ENCOUNTER — OFFICE VISIT (OUTPATIENT)
Dept: CARDIOLOGY | Age: 35
End: 2020-09-15

## 2020-09-15 VITALS
HEART RATE: 93 BPM | OXYGEN SATURATION: 99 % | RESPIRATION RATE: 18 BRPM | SYSTOLIC BLOOD PRESSURE: 130 MMHG | BODY MASS INDEX: 27.66 KG/M2 | DIASTOLIC BLOOD PRESSURE: 88 MMHG | HEIGHT: 64 IN | WEIGHT: 162 LBS

## 2020-09-15 PROBLEM — O16.9 HTN COMPLICATING PERIPREGNANCY, ANTEPARTUM: Status: ACTIVE | Noted: 2020-09-15

## 2020-09-15 PROBLEM — O14.20 HELLP SYNDROME: Status: ACTIVE | Noted: 2020-09-15

## 2020-09-15 PROBLEM — O14.10 SEVERE PREECLAMPSIA: Status: ACTIVE | Noted: 2020-09-15

## 2020-09-15 PROBLEM — O14.10 SEVERE PREECLAMPSIA: Status: RESOLVED | Noted: 2020-09-15 | Resolved: 2020-09-15

## 2020-09-15 PROCEDURE — 99215 OFFICE O/P EST HI 40 MIN: CPT | Performed by: INTERNAL MEDICINE

## 2020-09-15 PROCEDURE — 3075F SYST BP GE 130 - 139MM HG: CPT | Performed by: INTERNAL MEDICINE

## 2020-09-15 PROCEDURE — 3079F DIAST BP 80-89 MM HG: CPT | Performed by: INTERNAL MEDICINE

## 2020-09-15 ASSESSMENT — PATIENT HEALTH QUESTIONNAIRE - PHQ9
CLINICAL INTERPRETATION OF PHQ9 SCORE: NO FURTHER SCREENING NEEDED
2. FEELING DOWN, DEPRESSED OR HOPELESS: NOT AT ALL
SUM OF ALL RESPONSES TO PHQ9 QUESTIONS 1 AND 2: 0
1. LITTLE INTEREST OR PLEASURE IN DOING THINGS: NOT AT ALL
SUM OF ALL RESPONSES TO PHQ9 QUESTIONS 1 AND 2: 0
CLINICAL INTERPRETATION OF PHQ2 SCORE: NO FURTHER SCREENING NEEDED

## 2020-09-17 LAB — ALDOSTERONE: 20 NG/DL

## 2020-09-18 LAB — RENIN ACTIVITY: 1.2 NG/ML/HR

## 2020-09-24 ENCOUNTER — OFFICE VISIT (OUTPATIENT)
Dept: OBGYN CLINIC | Facility: CLINIC | Age: 35
End: 2020-09-24
Payer: COMMERCIAL

## 2020-09-24 VITALS — SYSTOLIC BLOOD PRESSURE: 120 MMHG | WEIGHT: 164 LBS | DIASTOLIC BLOOD PRESSURE: 82 MMHG | BODY MASS INDEX: 28 KG/M2

## 2020-09-24 DIAGNOSIS — I10 CHRONIC HYPERTENSION: ICD-10-CM

## 2020-09-24 PROCEDURE — 3074F SYST BP LT 130 MM HG: CPT | Performed by: OBSTETRICS & GYNECOLOGY

## 2020-09-24 PROCEDURE — 3079F DIAST BP 80-89 MM HG: CPT | Performed by: OBSTETRICS & GYNECOLOGY

## 2020-09-24 NOTE — PROGRESS NOTES
[facility]  Obstetrics and Gynecology   Postpartum Progress Note    Subjective:     Jim To is a 38year old  female who is s/p classical CS. Antepartum issues: HELLP, CHTN with MINOO at 26 weeks. She reports doing well.  Baby discussed with the patient. She notes understanding and agrees with the plan of care. All questions were answered to the best of my ability at this time.     MD Stephan   [address]

## 2020-12-10 ENCOUNTER — APPOINTMENT (OUTPATIENT)
Dept: CARDIOLOGY | Age: 35
End: 2020-12-10
Attending: INTERNAL MEDICINE

## 2020-12-16 ENCOUNTER — ANCILLARY PROCEDURE (OUTPATIENT)
Dept: CARDIOLOGY | Age: 35
End: 2020-12-16
Attending: INTERNAL MEDICINE

## 2020-12-16 ENCOUNTER — APPOINTMENT (OUTPATIENT)
Dept: CARDIOLOGY | Age: 35
End: 2020-12-16
Attending: INTERNAL MEDICINE

## 2020-12-16 ENCOUNTER — OFFICE VISIT (OUTPATIENT)
Dept: CARDIOLOGY | Age: 35
End: 2020-12-16

## 2020-12-16 VITALS
DIASTOLIC BLOOD PRESSURE: 76 MMHG | BODY MASS INDEX: 28.67 KG/M2 | OXYGEN SATURATION: 98 % | SYSTOLIC BLOOD PRESSURE: 130 MMHG | HEART RATE: 92 BPM | WEIGHT: 167 LBS

## 2020-12-16 DIAGNOSIS — I10 ESSENTIAL HYPERTENSION: Primary | ICD-10-CM

## 2020-12-16 PROCEDURE — 93356 MYOCRD STRAIN IMG SPCKL TRCK: CPT | Performed by: INTERNAL MEDICINE

## 2020-12-16 PROCEDURE — 93321 DOPPLER ECHO F-UP/LMTD STD: CPT | Performed by: INTERNAL MEDICINE

## 2020-12-16 PROCEDURE — 3078F DIAST BP <80 MM HG: CPT | Performed by: INTERNAL MEDICINE

## 2020-12-16 PROCEDURE — 3075F SYST BP GE 130 - 139MM HG: CPT | Performed by: INTERNAL MEDICINE

## 2020-12-16 PROCEDURE — 99213 OFFICE O/P EST LOW 20 MIN: CPT | Performed by: INTERNAL MEDICINE

## 2020-12-16 PROCEDURE — 93325 DOPPLER ECHO COLOR FLOW MAPG: CPT | Performed by: INTERNAL MEDICINE

## 2020-12-16 PROCEDURE — 93308 TTE F-UP OR LMTD: CPT | Performed by: INTERNAL MEDICINE

## 2020-12-16 RX ORDER — LABETALOL 100 MG/1
200 TABLET, FILM COATED ORAL 2 TIMES DAILY
Qty: 120 TABLET | Refills: 11 | Status: SHIPPED | OUTPATIENT
Start: 2020-12-16 | End: 2021-09-15

## 2020-12-16 ASSESSMENT — PATIENT HEALTH QUESTIONNAIRE - PHQ9
CLINICAL INTERPRETATION OF PHQ9 SCORE: NO FURTHER SCREENING NEEDED
1. LITTLE INTEREST OR PLEASURE IN DOING THINGS: NOT AT ALL
SUM OF ALL RESPONSES TO PHQ9 QUESTIONS 1 AND 2: 0
2. FEELING DOWN, DEPRESSED OR HOPELESS: NOT AT ALL
CLINICAL INTERPRETATION OF PHQ2 SCORE: NO FURTHER SCREENING NEEDED
SUM OF ALL RESPONSES TO PHQ9 QUESTIONS 1 AND 2: 0

## 2020-12-19 ENCOUNTER — IMMUNIZATION (OUTPATIENT)
Dept: LAB | Facility: HOSPITAL | Age: 35
End: 2020-12-19
Attending: PREVENTIVE MEDICINE
Payer: COMMERCIAL

## 2020-12-19 DIAGNOSIS — Z23 NEED FOR VACCINATION: ICD-10-CM

## 2020-12-19 PROCEDURE — 0001A PFIZER-BIONTECH COVID-19 VACCINE: CPT

## 2020-12-31 ENCOUNTER — LAB ENCOUNTER (OUTPATIENT)
Dept: LAB | Facility: HOSPITAL | Age: 35
End: 2020-12-31
Attending: INTERNAL MEDICINE
Payer: COMMERCIAL

## 2020-12-31 DIAGNOSIS — I10 ESSENTIAL HYPERTENSION, MALIGNANT: Primary | ICD-10-CM

## 2020-12-31 PROCEDURE — 84244 ASSAY OF RENIN: CPT

## 2020-12-31 PROCEDURE — 36415 COLL VENOUS BLD VENIPUNCTURE: CPT

## 2020-12-31 PROCEDURE — 82088 ASSAY OF ALDOSTERONE: CPT

## 2021-01-01 ENCOUNTER — EXTERNAL RECORD (OUTPATIENT)
Dept: OTHER | Age: 36
End: 2021-01-01

## 2021-01-02 LAB — ALDOSTERONE: 15.9 NG/DL

## 2021-01-05 LAB — RENIN ACTIVITY: 0.4 NG/ML/HR

## 2021-01-09 ENCOUNTER — IMMUNIZATION (OUTPATIENT)
Dept: LAB | Facility: HOSPITAL | Age: 36
End: 2021-01-09
Attending: PREVENTIVE MEDICINE
Payer: COMMERCIAL

## 2021-01-09 DIAGNOSIS — Z23 NEED FOR VACCINATION: ICD-10-CM

## 2021-01-09 PROCEDURE — 0002A SARSCOV2 VAC 30MCG/0.3ML IM: CPT

## 2021-01-22 NOTE — PROGRESS NOTES
I was the attending physician on duty at the time the patient visited the emergency department  The patient was evaluated and dispositioned by the APC  I was personally available for consultation  I am administratively signing the chart after the fact      Jonas Fine MD [facility]  Obstetrics and Gynecology    OB/GYN: Antepartum Progress Note     SUBJECTIVE:  Patient is a 38year old  female at 25w6d admitted for [facility] with [address] [facility]. Patient reports feeling well. No HA or RUQ pain. Nervous about outcome.

## 2021-03-16 NOTE — TELEPHONE ENCOUNTER
Msg to ROSHNI to review and advise. Msg also placed on his desk. Abdomen soft, non-tender, no guarding.

## 2021-09-15 ENCOUNTER — OFFICE VISIT (OUTPATIENT)
Dept: CARDIOLOGY | Age: 36
End: 2021-09-15

## 2021-09-15 VITALS
DIASTOLIC BLOOD PRESSURE: 80 MMHG | HEART RATE: 72 BPM | SYSTOLIC BLOOD PRESSURE: 128 MMHG | HEIGHT: 64 IN | BODY MASS INDEX: 24.75 KG/M2 | WEIGHT: 145 LBS

## 2021-09-15 DIAGNOSIS — I10 ESSENTIAL HYPERTENSION: Primary | ICD-10-CM

## 2021-09-15 PROCEDURE — X1094 NO CHARGE VISIT: HCPCS | Performed by: INTERNAL MEDICINE

## 2021-09-15 RX ORDER — LOSARTAN POTASSIUM 25 MG/1
25 TABLET ORAL DAILY
Qty: 30 TABLET | Refills: 11 | Status: SHIPPED | OUTPATIENT
Start: 2021-09-15

## 2021-09-15 RX ORDER — NIFEDIPINE 30 MG/1
30 TABLET, EXTENDED RELEASE ORAL DAILY
COMMUNITY

## 2021-09-15 SDOH — HEALTH STABILITY: MENTAL HEALTH: DEPRESSION SCREENING SCORE: 0

## 2021-09-15 SDOH — HEALTH STABILITY: MENTAL HEALTH: LITTLE INTEREST OR PLEASURE IN ACTIVITY?: NOT AT ALL

## 2021-09-15 SDOH — HEALTH STABILITY: MENTAL HEALTH: PHQ2 INTERPRETATION: NO FURTHER SCREENING NEEDED

## 2021-09-15 SDOH — HEALTH STABILITY: MENTAL HEALTH: FEELING DOWN, DEPRESSED OR HOPELESS?: NOT AT ALL

## 2021-09-15 SDOH — HEALTH STABILITY: PHYSICAL HEALTH

## 2021-09-15 ASSESSMENT — PATIENT HEALTH QUESTIONNAIRE - PHQ9
CLINICAL INTERPRETATION OF PHQ9 SCORE: NO FURTHER SCREENING NEEDED
SUM OF ALL RESPONSES TO PHQ9 QUESTIONS 1 AND 2: 0

## 2021-10-26 ENCOUNTER — TELEPHONE (OUTPATIENT)
Dept: INTERNAL MEDICINE CLINIC | Facility: CLINIC | Age: 36
End: 2021-10-26

## 2021-10-26 NOTE — TELEPHONE ENCOUNTER
Pt is calling to request medical records faxed to Dr. Malika Herman. Reports she was attempting to schedule follow up exam and was advised records need to be sent first for update to MD. GRAHAM per Dr. Yoandy Bolden. Records faxed; confirmation received.      Pt noti

## 2021-11-03 PROCEDURE — 88175 CYTOPATH C/V AUTO FLUID REDO: CPT | Performed by: CLINICAL MEDICAL LABORATORY

## 2021-11-03 PROCEDURE — 87624 HPV HI-RISK TYP POOLED RSLT: CPT | Performed by: CLINICAL MEDICAL LABORATORY

## 2021-11-04 ENCOUNTER — LAB REQUISITION (OUTPATIENT)
Dept: LAB | Age: 36
End: 2021-11-04

## 2021-11-04 DIAGNOSIS — Z12.4 ENCOUNTER FOR SCREENING FOR MALIGNANT NEOPLASM OF CERVIX: ICD-10-CM

## 2021-11-04 DIAGNOSIS — C56.1 MALIGNANT NEOPLASM OF RIGHT OVARY (CMD): ICD-10-CM

## 2021-11-11 LAB
CASE RPRT: NORMAL
CYTOLOGY CVX/VAG STUDY: NORMAL
HPV16+18+45 E6+E7MRNA CVX NAA+PROBE: NEGATIVE
Lab: NORMAL
PAP EDUCATIONAL NOTE: NORMAL
SPECIMEN ADEQUACY: NORMAL

## 2021-11-16 ENCOUNTER — LAB ENCOUNTER (OUTPATIENT)
Dept: LAB | Age: 36
End: 2021-11-16
Attending: INTERNAL MEDICINE
Payer: COMMERCIAL

## 2021-11-16 DIAGNOSIS — I10 ESSENTIAL HYPERTENSION, MALIGNANT: Primary | ICD-10-CM

## 2021-11-16 LAB
ABSOLUTE IMMATURE GRANULOCYTES (OFFPRE24): 0.01 X10(3) UL (ref 0–1)
ALBUMIN SERPL-MCNC: 3.7 G/DL (ref 3.4–5)
ALBUMIN/GLOB SERPL: 1 {RATIO} (ref 1–2)
ALP SERPL-CCNC: 50 U/L (ref 37–98)
ALT SERPL-CCNC: 21 U/L (ref 13–56)
ANION GAP SERPL CALC-SCNC: 5 MMOL/L (ref 0–18)
AST SERPL-CCNC: 13 U/L (ref 15–37)
BASOPHILS # BLD: 0.06 X10(3) UL (ref 0–0.2)
BASOPHILS NFR BLD: 1 %
BILIRUB SERPL-MCNC: 0.2 MG/DL (ref 0.1–2)
BUN SERPL-MCNC: 20 MG/DL (ref 7–18)
CALCIUM SERPL-MCNC: 9.3 MG/DL (ref 8.5–10.1)
CHLORIDE SERPL-SCNC: 109 MMOL/L (ref 98–112)
CHOLEST SERPL-MCNC: 158 MG/DL
CO2 SERPL-SCNC: 25 MMOL/L (ref 21–32)
CREAT SERPL-MCNC: 0.8 MG/DL (ref 0.55–1.02)
EOSINOPHIL # BLD: 0.11 X10(3) UL (ref 0–0.7)
EOSINOPHIL NFR BLD: 1.8 %
ERYTHROCYTE [DISTWIDTH] IN BLOOD: 13.7 %
GLOBULIN SER-MCNC: 3.6 G/DL (ref 2.8–4.4)
GLUCOSE SERPL-MCNC: 81 MG/DL (ref 70–99)
HCT VFR BLD CALC: 37 % (ref 35–48)
HDLC SERPL-MCNC: 61 MG/DL (ref 40–59)
HGB BLD-MCNC: 11.3 G/DL (ref 12–16)
IMMATURE GRANULOCYTES (OFFPRE25): 0.2 %
LDLC SERPL CALC-MCNC: 88 MG/DL
LENGTH OF FAST TIME PATIENT: YES H
LENGTH OF FAST TIME PATIENT: YES H
LYMPHOCYTES # BLD: 2.34 X10(3) UL (ref 1–4)
LYMPHOCYTES NFR BLD: 38.1 %
MCH RBC QN AUTO: 24.8 PG (ref 25–34)
MCHC RBC AUTO-ENTMCNC: 30.5 G/DL (ref 31–37)
MCV RBC AUTO: 81.1 FL (ref 80–100)
MONOCYTES # BLD: 0.42 X10(3) UL (ref 0.1–1)
MONOCYTES NFR BLD: 6.8 %
NEUTROPHILS # BLD: 3.2 X10(3) UL (ref 1.5–7.7)
NEUTROPHILS NFR BLD: 52.1 %
NONHDLC SERPL-MCNC: 97 MG/DL
PLATELET # BLD: 317 10(3)UL (ref 150–450)
POTASSIUM SERPL-SCNC: 4.7 MMOL/L (ref 3.5–5.1)
PROT SERPL-MCNC: 7.3 G/DL (ref 6.4–8.2)
RBC # BLD: 4.56 X10(6)UL (ref 3.8–5.3)
SODIUM SERPL-SCNC: 139 MMOL/L (ref 136–145)
TRIGL SERPL-MCNC: 42 MG/DL (ref 30–149)
TSH SERPL-ACNC: 0.39 MIU/ML (ref 0.36–3.74)
VLDLC SERPL CALC-MCNC: 7 MG/DL (ref 0–30)
WBC # BLD: 6.1 X10(3) UL (ref 4–11)

## 2021-11-16 PROCEDURE — 84244 ASSAY OF RENIN: CPT

## 2021-11-16 PROCEDURE — 80053 COMPREHEN METABOLIC PANEL: CPT

## 2021-11-16 PROCEDURE — 82088 ASSAY OF ALDOSTERONE: CPT

## 2021-11-16 PROCEDURE — 80061 LIPID PANEL: CPT

## 2021-11-16 PROCEDURE — 36415 COLL VENOUS BLD VENIPUNCTURE: CPT

## 2021-11-16 PROCEDURE — 84443 ASSAY THYROID STIM HORMONE: CPT

## 2021-11-16 PROCEDURE — 85025 COMPLETE CBC W/AUTO DIFF WBC: CPT

## 2021-11-17 ENCOUNTER — TELEPHONE (OUTPATIENT)
Dept: CARDIOLOGY | Age: 36
End: 2021-11-17

## 2021-11-18 LAB — ALDOST SERPL-MCNC: 28.3 NG/DL

## 2021-11-19 LAB — RENIN PLAS-CCNC: 0.8 NG/ML/HR

## 2021-12-02 ENCOUNTER — HOSPITAL ENCOUNTER (OUTPATIENT)
Dept: ULTRASOUND IMAGING | Facility: HOSPITAL | Age: 36
Discharge: HOME | End: 2021-12-02
Attending: OBSTETRICS & GYNECOLOGY
Payer: COMMERCIAL

## 2021-12-02 DIAGNOSIS — C56.1 OVARIAN CA, RIGHT (HCC): ICD-10-CM

## 2021-12-02 PROCEDURE — 76830 TRANSVAGINAL US NON-OB: CPT | Performed by: OBSTETRICS & GYNECOLOGY

## 2021-12-02 PROCEDURE — 76856 US EXAM PELVIC COMPLETE: CPT | Performed by: OBSTETRICS & GYNECOLOGY

## 2021-12-08 ENCOUNTER — OFFICE VISIT (OUTPATIENT)
Dept: INTERNAL MEDICINE CLINIC | Facility: CLINIC | Age: 36
End: 2021-12-08
Payer: COMMERCIAL

## 2021-12-08 VITALS
HEIGHT: 64 IN | SYSTOLIC BLOOD PRESSURE: 132 MMHG | TEMPERATURE: 98 F | DIASTOLIC BLOOD PRESSURE: 86 MMHG | HEART RATE: 82 BPM | BODY MASS INDEX: 24.41 KG/M2 | OXYGEN SATURATION: 99 % | WEIGHT: 143 LBS

## 2021-12-08 DIAGNOSIS — D50.9 MICROCYTIC ANEMIA: ICD-10-CM

## 2021-12-08 DIAGNOSIS — E55.9 VITAMIN D DEFICIENCY: ICD-10-CM

## 2021-12-08 DIAGNOSIS — I10 HYPERTENSION, UNSPECIFIED TYPE: Primary | ICD-10-CM

## 2021-12-08 PROBLEM — O36.5920 POOR FETAL GROWTH AFFECTING MANAGEMENT OF MOTHER IN SECOND TRIMESTER (HCC): Status: RESOLVED | Noted: 2020-07-27 | Resolved: 2021-12-08

## 2021-12-08 PROBLEM — O36.5920 POOR FETAL GROWTH AFFECTING MANAGEMENT OF MOTHER IN SECOND TRIMESTER: Status: RESOLVED | Noted: 2020-07-27 | Resolved: 2021-12-08

## 2021-12-08 PROBLEM — R73.9 HYPERGLYCEMIA: Status: RESOLVED | Noted: 2019-01-13 | Resolved: 2021-12-08

## 2021-12-08 PROBLEM — Z34.90 PREGNANCY: Status: RESOLVED | Noted: 2020-06-02 | Resolved: 2021-12-08

## 2021-12-08 PROBLEM — O16.2 HYPERTENSION AFFECTING PREGNANCY IN SECOND TRIMESTER: Status: RESOLVED | Noted: 2020-07-27 | Resolved: 2021-12-08

## 2021-12-08 PROBLEM — Z34.90 PREGNANCY (HCC): Status: RESOLVED | Noted: 2020-06-02 | Resolved: 2021-12-08

## 2021-12-08 PROBLEM — O16.2 HYPERTENSION AFFECTING PREGNANCY IN SECOND TRIMESTER (HCC): Status: RESOLVED | Noted: 2020-07-27 | Resolved: 2021-12-08

## 2021-12-08 PROCEDURE — 3008F BODY MASS INDEX DOCD: CPT | Performed by: INTERNAL MEDICINE

## 2021-12-08 PROCEDURE — 3075F SYST BP GE 130 - 139MM HG: CPT | Performed by: INTERNAL MEDICINE

## 2021-12-08 PROCEDURE — 3079F DIAST BP 80-89 MM HG: CPT | Performed by: INTERNAL MEDICINE

## 2021-12-08 RX ORDER — LOSARTAN POTASSIUM 25 MG/1
50 TABLET ORAL DAILY
COMMUNITY
Start: 2021-09-15 | End: 2021-12-08

## 2021-12-08 RX ORDER — AMLODIPINE BESYLATE 5 MG/1
5 TABLET ORAL DAILY
Qty: 90 TABLET | Refills: 3 | Status: SHIPPED | OUTPATIENT
Start: 2021-12-08 | End: 2022-12-03

## 2021-12-08 NOTE — PROGRESS NOTES
Bipin Sanabria is a 39year old female. Patient presents with:  Physical: Flu shot and Covid shot done. Pap done. HPI:   Bipin Sanabria is a 39year old female who presents for a complete physical exam.   Feels well. Has lost weight with diet. Never Smoker      Smokeless tobacco: Never Used    Vaping Use      Vaping Use: Never used    Alcohol use: Not Currently      Comment: about 1 glass of wine per week    Drug use: No         REVIEW OF SYSTEMS:   GENERAL: feels well otherwise  SKIN: denies an Beckemeyer -- very low risk for contralateral cystadenoma (on left) was <1%; was told she does not need monitoring. nEma leaning towards not having surgery.     Iron deficiency anemia  Hgb 11.3 -- cont iron supplement    Merlin Dike, MD  12/8/2021  12:

## 2021-12-15 ENCOUNTER — OFFICE VISIT (OUTPATIENT)
Dept: OBGYN CLINIC | Facility: CLINIC | Age: 36
End: 2021-12-15
Payer: COMMERCIAL

## 2021-12-15 VITALS
HEART RATE: 84 BPM | WEIGHT: 143.31 LBS | DIASTOLIC BLOOD PRESSURE: 83 MMHG | SYSTOLIC BLOOD PRESSURE: 124 MMHG | BODY MASS INDEX: 25 KG/M2

## 2021-12-15 DIAGNOSIS — Z01.419 ENCOUNTER FOR GYNECOLOGICAL EXAMINATION WITHOUT ABNORMAL FINDING: Primary | ICD-10-CM

## 2021-12-15 PROCEDURE — 3079F DIAST BP 80-89 MM HG: CPT | Performed by: OBSTETRICS & GYNECOLOGY

## 2021-12-15 PROCEDURE — 3074F SYST BP LT 130 MM HG: CPT | Performed by: OBSTETRICS & GYNECOLOGY

## 2021-12-27 NOTE — PROGRESS NOTES
Subjective:   Patient ID: Danna Nicole is a 39year old female. HPI G4 [phone] with menses q 28d / 3-4d / heavy on day 1. No new family or personal medical issues. Dr Juliet Fields for PCP and controls the HTN meds along with Dr Veronika Giles.  New work up for [employer]

## 2022-02-04 ENCOUNTER — TELEPHONE (OUTPATIENT)
Dept: OBGYN CLINIC | Facility: CLINIC | Age: 37
End: 2022-02-04

## 2022-02-04 RX ORDER — MISOPROSTOL 200 UG/1
400 TABLET ORAL ONCE
Qty: 2 TABLET | Refills: 0 | Status: SHIPPED | OUTPATIENT
Start: 2022-02-04 | End: 2022-02-04

## 2022-02-04 NOTE — TELEPHONE ENCOUNTER
I had personal communication with Dr Naya Mae. Can we place her at 320 PM Tuesday Feb 8th at [place] please. Use the full 20 minute slot. Please make sure we have Deni Shepherd there. Thank you.

## 2022-02-08 ENCOUNTER — OFFICE VISIT (OUTPATIENT)
Dept: OBGYN CLINIC | Facility: CLINIC | Age: 37
End: 2022-02-08
Payer: COMMERCIAL

## 2022-02-08 VITALS
SYSTOLIC BLOOD PRESSURE: 130 MMHG | DIASTOLIC BLOOD PRESSURE: 84 MMHG | BODY MASS INDEX: 25 KG/M2 | HEART RATE: 76 BPM | WEIGHT: 143 LBS

## 2022-02-08 DIAGNOSIS — Z30.430 ENCOUNTER FOR INSERTION OF INTRAUTERINE CONTRACEPTIVE DEVICE (IUD): ICD-10-CM

## 2022-02-08 PROCEDURE — 3079F DIAST BP 80-89 MM HG: CPT | Performed by: OBSTETRICS & GYNECOLOGY

## 2022-02-08 PROCEDURE — 3075F SYST BP GE 130 - 139MM HG: CPT | Performed by: OBSTETRICS & GYNECOLOGY

## 2022-02-08 PROCEDURE — 58300 INSERT INTRAUTERINE DEVICE: CPT | Performed by: OBSTETRICS & GYNECOLOGY

## 2022-02-13 PROBLEM — Z30.430 ENCOUNTER FOR INSERTION OF INTRAUTERINE CONTRACEPTIVE DEVICE (IUD): Status: ACTIVE | Noted: 2022-02-13

## 2022-02-13 NOTE — PROGRESS NOTES
IUD Insertion     Pregnancy Results: n/a, on menses    Birth control method(s) used: condom,   Consent signed. Procedure discussed with the patient in detail including indication, risks, benefits, alternatives and complications. Pelvic Exam Findings:  EG, vagina and cervix w/o lesions. Procedure:  Speculum placed in the vagina. Betadine wash of vagina and cervix. Single tooth tenaculum was placed at the 12 o'clock position. Cervical stenosis encountered. Disposable sound used to dilate cervix. Uterus sounded to 7 cm. Wade Ankush IUD was placed without difficulty. Strings cut at 2.5 cm. Single tooth tenaculum removed. Good hemostasis noted. Patient tolerated procedure well. Visit Plan:  IUD surveillance was discussed with the patient.

## 2022-03-03 ENCOUNTER — HOSPITAL ENCOUNTER (OUTPATIENT)
Dept: ULTRASOUND IMAGING | Facility: HOSPITAL | Age: 37
Discharge: HOME | End: 2022-03-03
Attending: OBSTETRICS & GYNECOLOGY
Payer: COMMERCIAL

## 2022-03-03 DIAGNOSIS — C56.1 OVARIAN CANCER, RIGHT (HCC): ICD-10-CM

## 2022-03-03 PROCEDURE — 76856 US EXAM PELVIC COMPLETE: CPT | Performed by: OBSTETRICS & GYNECOLOGY

## 2022-03-03 PROCEDURE — 76830 TRANSVAGINAL US NON-OB: CPT | Performed by: OBSTETRICS & GYNECOLOGY

## 2022-03-30 NOTE — TELEPHONE ENCOUNTER
CAP signed on dates for pt's timeline. Sent to scanning. PAST SURGICAL HISTORY:  No significant past surgical history

## 2022-04-20 RX ORDER — ESCITALOPRAM OXALATE 5 MG/1
5 TABLET ORAL DAILY
Qty: 90 TABLET | Refills: 3 | Status: SHIPPED | OUTPATIENT
Start: 2022-04-20

## 2022-04-25 ENCOUNTER — TELEPHONE (OUTPATIENT)
Dept: INTERNAL MEDICINE CLINIC | Facility: CLINIC | Age: 37
End: 2022-04-25

## 2022-04-25 NOTE — TELEPHONE ENCOUNTER
Exposure 4/21/22; sxs x 3d; vaccine UTD; nares positive COVID 4/25    Imp- COVID infection    Rec- Paxlovid BID x 5d; FYI to DR Valentina Knight

## 2022-05-13 ENCOUNTER — HOSPITAL ENCOUNTER (OUTPATIENT)
Dept: ULTRASOUND IMAGING | Facility: HOSPITAL | Age: 37
Discharge: HOME | End: 2022-05-13
Attending: OBSTETRICS & GYNECOLOGY
Payer: COMMERCIAL

## 2022-05-13 DIAGNOSIS — C56.1 OVARIAN CANCER ON RIGHT (HCC): ICD-10-CM

## 2022-05-13 PROCEDURE — 76830 TRANSVAGINAL US NON-OB: CPT | Performed by: OBSTETRICS & GYNECOLOGY

## 2022-05-13 PROCEDURE — 76856 US EXAM PELVIC COMPLETE: CPT | Performed by: OBSTETRICS & GYNECOLOGY

## 2022-05-17 ENCOUNTER — LAB ENCOUNTER (OUTPATIENT)
Dept: LAB | Age: 37
End: 2022-05-17
Attending: PHYSICIAN ASSISTANT
Payer: COMMERCIAL

## 2022-05-17 DIAGNOSIS — C56.1 OVARIAN CA, RIGHT (HCC): Primary | ICD-10-CM

## 2022-05-17 LAB — CANCER AG125 SERPL-ACNC: 12 U/ML (ref ?–35)

## 2022-05-17 PROCEDURE — 36415 COLL VENOUS BLD VENIPUNCTURE: CPT

## 2022-05-17 PROCEDURE — 86304 IMMUNOASSAY TUMOR CA 125: CPT

## 2022-05-25 ENCOUNTER — TELEPHONE (OUTPATIENT)
Dept: OBGYN CLINIC | Facility: CLINIC | Age: 37
End: 2022-05-25

## 2022-06-03 ENCOUNTER — HOSPITAL ENCOUNTER (OUTPATIENT)
Dept: MRI IMAGING | Age: 37
Discharge: HOME | End: 2022-06-03
Attending: OBSTETRICS & GYNECOLOGY
Payer: COMMERCIAL

## 2022-06-03 DIAGNOSIS — N83.8 OVARIAN MASS: ICD-10-CM

## 2022-06-03 DIAGNOSIS — C56.1 OVARIAN CANCER, RIGHT (HCC): ICD-10-CM

## 2022-06-03 PROCEDURE — 72197 MRI PELVIS W/O & W/DYE: CPT | Performed by: OBSTETRICS & GYNECOLOGY

## 2022-06-03 PROCEDURE — A9575 INJ GADOTERATE MEGLUMI 0.1ML: HCPCS | Performed by: OBSTETRICS & GYNECOLOGY

## 2022-06-22 ENCOUNTER — MED REC SCAN ONLY (OUTPATIENT)
Dept: INTERNAL MEDICINE CLINIC | Facility: CLINIC | Age: 37
End: 2022-06-22

## 2022-09-16 ENCOUNTER — IMMUNIZATION (OUTPATIENT)
Dept: LAB | Age: 37
End: 2022-09-16
Attending: EMERGENCY MEDICINE
Payer: COMMERCIAL

## 2022-09-16 DIAGNOSIS — Z23 NEED FOR VACCINATION: Primary | ICD-10-CM

## 2022-09-16 PROCEDURE — 0124A SARSCOV2 VAC BVL 30MCG/0.3ML: CPT

## 2022-09-17 ENCOUNTER — HOSPITAL ENCOUNTER (OUTPATIENT)
Dept: ULTRASOUND IMAGING | Age: 37
Discharge: HOME | End: 2022-09-17
Attending: OBSTETRICS & GYNECOLOGY

## 2022-09-17 DIAGNOSIS — R19.04 ABDOMINAL MASS, LEFT LOWER QUADRANT: ICD-10-CM

## 2022-09-17 DIAGNOSIS — N83.8 OVARIAN MASS, LEFT: ICD-10-CM

## 2022-09-17 PROCEDURE — 76830 TRANSVAGINAL US NON-OB: CPT | Performed by: OBSTETRICS & GYNECOLOGY

## 2022-09-17 PROCEDURE — 76856 US EXAM PELVIC COMPLETE: CPT | Performed by: OBSTETRICS & GYNECOLOGY

## 2022-10-18 ENCOUNTER — IMMUNIZATION (OUTPATIENT)
Dept: LAB | Facility: HOSPITAL | Age: 37
End: 2022-10-18

## 2022-10-18 DIAGNOSIS — Z23 NEED FOR VACCINATION: Primary | ICD-10-CM

## 2022-10-18 PROCEDURE — 90471 IMMUNIZATION ADMIN: CPT

## 2022-12-20 RX ORDER — AMLODIPINE BESYLATE 5 MG/1
TABLET ORAL
Qty: 90 TABLET | Refills: 3 | Status: SHIPPED | OUTPATIENT
Start: 2022-12-20

## 2022-12-22 NOTE — H&P
[facility]    PATIENT'S NAME: Gualberto Gray   ATTENDING PHYSICIAN: Christiano Lim DO   PATIENT ACCOUNT#:   [de-identified]    LOCATION:  [facility]  MEDICAL RECORD #:   [id number]       YOB: 1985  ADMISSION DATE:           Marisel Eddy juventino adan from er a/a/o 4 on bipap this.  She had this followup ultrasound performed on July 11, and this showed a persistent right adnexal complex mass.   It was described at this point as multiple cystic lesions on the right side, one measuring 1.7 cm, a second measuring 2.7 cm, and a thir IMPRESSION:  Complex right ovarian mass. PLAN:  Laparoscopy with right ovarian cystectomy and possible right oophorectomy. These procedures with their indications were discussed with the patient over the phone.   We will use prophylactic measures

## 2023-09-20 DIAGNOSIS — C56.9 OVARIAN CANCER (CMD): Primary | ICD-10-CM

## 2023-10-18 ENCOUNTER — HOSPITAL ENCOUNTER (OUTPATIENT)
Dept: CT IMAGING | Facility: HOSPITAL | Age: 38
Discharge: HOME | End: 2023-10-18
Attending: INTERNAL MEDICINE
Payer: COMMERCIAL

## 2023-10-18 DIAGNOSIS — R10.11 RUQ PAIN: ICD-10-CM

## 2023-10-18 DIAGNOSIS — R10.31 RLQ ABDOMINAL PAIN: Primary | ICD-10-CM

## 2023-10-18 DIAGNOSIS — R10.31 RLQ ABDOMINAL PAIN: ICD-10-CM

## 2023-10-18 LAB
CREAT BLD-MCNC: 0.6 MG/DL
EGFRCR SERPLBLD CKD-EPI 2021: 117 ML/MIN/1.73M2 (ref 60–?)

## 2023-10-18 PROCEDURE — 74177 CT ABD & PELVIS W/CONTRAST: CPT | Performed by: INTERNAL MEDICINE

## 2023-10-18 PROCEDURE — 82565 ASSAY OF CREATININE: CPT

## 2023-11-08 ENCOUNTER — TELEPHONE (OUTPATIENT)
Dept: INTERNAL MEDICINE CLINIC | Facility: CLINIC | Age: 38
End: 2023-11-08

## 2023-11-08 DIAGNOSIS — E55.9 VITAMIN D DEFICIENCY: Primary | ICD-10-CM

## 2023-11-08 DIAGNOSIS — Z12.31 SCREENING MAMMOGRAM FOR BREAST CANCER: ICD-10-CM

## 2023-11-08 DIAGNOSIS — Z00.00 ANNUAL PHYSICAL EXAM: ICD-10-CM

## 2023-11-08 DIAGNOSIS — D50.9 MICROCYTIC ANEMIA: ICD-10-CM

## 2023-11-08 DIAGNOSIS — I10 HYPERTENSION, UNSPECIFIED TYPE: ICD-10-CM

## 2023-11-08 NOTE — TELEPHONE ENCOUNTER
Patient called and requesting blood work and mammogram for annual physical. Orders ordered per protocol.
Higher Level of Care or Service Not Available

## 2023-11-27 RX ORDER — AMLODIPINE BESYLATE 5 MG/1
TABLET ORAL
Qty: 90 TABLET | Refills: 3 | Status: SHIPPED | OUTPATIENT
Start: 2023-11-27

## 2023-11-27 NOTE — TELEPHONE ENCOUNTER
Refill request is for a maintenance medication and has met the criteria specified in the Ambulatory Medication Refill Standing Order for eligibility, visits, laboratory, alerts and was sent to the requested pharmacy.     Requested Prescriptions     Signed Prescriptions Disp Refills    AMLODIPINE 5 MG Oral Tab 90 tablet 3     Sig: TAKE 1 TABLET(5 MG) BY MOUTH DAILY     Authorizing Provider: Talya Jackson     Ordering User: Cecy Allen

## 2023-12-01 ENCOUNTER — LAB ENCOUNTER (OUTPATIENT)
Dept: LAB | Age: 38
End: 2023-12-01
Attending: INTERNAL MEDICINE
Payer: COMMERCIAL

## 2023-12-01 DIAGNOSIS — D50.9 MICROCYTIC ANEMIA: ICD-10-CM

## 2023-12-01 DIAGNOSIS — Z00.00 ANNUAL PHYSICAL EXAM: ICD-10-CM

## 2023-12-01 DIAGNOSIS — I10 HYPERTENSION, UNSPECIFIED TYPE: ICD-10-CM

## 2023-12-01 DIAGNOSIS — E55.9 VITAMIN D DEFICIENCY: ICD-10-CM

## 2023-12-01 LAB
ALBUMIN SERPL-MCNC: 4.5 G/DL (ref 3.2–4.8)
ALBUMIN/GLOB SERPL: 1.5 {RATIO} (ref 1–2)
ALP LIVER SERPL-CCNC: 43 U/L
ALT SERPL-CCNC: 12 U/L
ANION GAP SERPL CALC-SCNC: 9 MMOL/L (ref 0–18)
AST SERPL-CCNC: 19 U/L (ref ?–34)
BASOPHILS # BLD AUTO: 0.05 X10(3) UL (ref 0–0.2)
BASOPHILS NFR BLD AUTO: 0.8 %
BILIRUB SERPL-MCNC: 0.4 MG/DL (ref 0.3–1.2)
BUN BLD-MCNC: 11 MG/DL (ref 9–23)
BUN/CREAT SERPL: 14.5 (ref 10–20)
CALCIUM BLD-MCNC: 9.5 MG/DL (ref 8.7–10.4)
CHLORIDE SERPL-SCNC: 107 MMOL/L (ref 98–112)
CHOLEST SERPL-MCNC: 158 MG/DL (ref ?–200)
CO2 SERPL-SCNC: 23 MMOL/L (ref 21–32)
CREAT BLD-MCNC: 0.76 MG/DL
DEPRECATED RDW RBC AUTO: 38 FL (ref 35.1–46.3)
EGFRCR SERPLBLD CKD-EPI 2021: 102 ML/MIN/1.73M2 (ref 60–?)
EOSINOPHIL # BLD AUTO: 0.23 X10(3) UL (ref 0–0.7)
EOSINOPHIL NFR BLD AUTO: 3.5 %
ERYTHROCYTE [DISTWIDTH] IN BLOOD BY AUTOMATED COUNT: 12.6 % (ref 11–15)
FASTING PATIENT LIPID ANSWER: YES
FASTING STATUS PATIENT QL REPORTED: YES
GLOBULIN PLAS-MCNC: 3.1 G/DL (ref 2.8–4.4)
GLUCOSE BLD-MCNC: 93 MG/DL (ref 70–99)
HCT VFR BLD AUTO: 39.3 %
HDLC SERPL-MCNC: 62 MG/DL (ref 40–59)
HGB BLD-MCNC: 12.4 G/DL
IMM GRANULOCYTES # BLD AUTO: 0.01 X10(3) UL (ref 0–1)
IMM GRANULOCYTES NFR BLD: 0.2 %
LDLC SERPL CALC-MCNC: 86 MG/DL (ref ?–100)
LYMPHOCYTES # BLD AUTO: 2.51 X10(3) UL (ref 1–4)
LYMPHOCYTES NFR BLD AUTO: 38.3 %
MCH RBC QN AUTO: 26.3 PG (ref 26–34)
MCHC RBC AUTO-ENTMCNC: 31.6 G/DL (ref 31–37)
MCV RBC AUTO: 83.3 FL
MONOCYTES # BLD AUTO: 0.5 X10(3) UL (ref 0.1–1)
MONOCYTES NFR BLD AUTO: 7.6 %
NEUTROPHILS # BLD AUTO: 3.26 X10 (3) UL (ref 1.5–7.7)
NEUTROPHILS # BLD AUTO: 3.26 X10(3) UL (ref 1.5–7.7)
NEUTROPHILS NFR BLD AUTO: 49.6 %
NONHDLC SERPL-MCNC: 96 MG/DL (ref ?–130)
OSMOLALITY SERPL CALC.SUM OF ELEC: 287 MOSM/KG (ref 275–295)
PLATELET # BLD AUTO: 298 10(3)UL (ref 150–450)
POTASSIUM SERPL-SCNC: 4.4 MMOL/L (ref 3.5–5.1)
PROT SERPL-MCNC: 7.6 G/DL (ref 5.7–8.2)
RBC # BLD AUTO: 4.72 X10(6)UL
SODIUM SERPL-SCNC: 139 MMOL/L (ref 136–145)
T3FREE SERPL-MCNC: 3.68 PG/ML (ref 2.4–4.2)
T4 FREE SERPL-MCNC: 1.2 NG/DL (ref 0.8–1.7)
TRIGL SERPL-MCNC: 49 MG/DL (ref 30–149)
TSI SER-ACNC: 0.43 MIU/ML (ref 0.55–4.78)
VIT B12 SERPL-MCNC: 466 PG/ML (ref 211–911)
VIT D+METAB SERPL-MCNC: 23 NG/ML (ref 30–100)
VLDLC SERPL CALC-MCNC: 8 MG/DL (ref 0–30)
WBC # BLD AUTO: 6.6 X10(3) UL (ref 4–11)

## 2023-12-01 PROCEDURE — 84439 ASSAY OF FREE THYROXINE: CPT

## 2023-12-01 PROCEDURE — 80061 LIPID PANEL: CPT

## 2023-12-01 PROCEDURE — 84443 ASSAY THYROID STIM HORMONE: CPT

## 2023-12-01 PROCEDURE — 80053 COMPREHEN METABOLIC PANEL: CPT

## 2023-12-01 PROCEDURE — 82306 VITAMIN D 25 HYDROXY: CPT

## 2023-12-01 PROCEDURE — 85025 COMPLETE CBC W/AUTO DIFF WBC: CPT

## 2023-12-01 PROCEDURE — 36415 COLL VENOUS BLD VENIPUNCTURE: CPT

## 2023-12-01 PROCEDURE — 82607 VITAMIN B-12: CPT

## 2023-12-01 PROCEDURE — 84481 FREE ASSAY (FT-3): CPT

## 2023-12-05 ENCOUNTER — HOSPITAL ENCOUNTER (OUTPATIENT)
Dept: ULTRASOUND IMAGING | Facility: HOSPITAL | Age: 38
Discharge: HOME | End: 2023-12-05
Attending: OBSTETRICS & GYNECOLOGY
Payer: COMMERCIAL

## 2023-12-05 DIAGNOSIS — C56.1 OVARIAN CANCER, RIGHT (HCC): ICD-10-CM

## 2023-12-05 PROCEDURE — 76856 US EXAM PELVIC COMPLETE: CPT | Performed by: OBSTETRICS & GYNECOLOGY

## 2023-12-05 PROCEDURE — 76830 TRANSVAGINAL US NON-OB: CPT | Performed by: OBSTETRICS & GYNECOLOGY

## 2023-12-07 ENCOUNTER — OFFICE VISIT (OUTPATIENT)
Dept: INTERNAL MEDICINE CLINIC | Facility: CLINIC | Age: 38
End: 2023-12-07

## 2023-12-07 VITALS
BODY MASS INDEX: 26 KG/M2 | TEMPERATURE: 98 F | SYSTOLIC BLOOD PRESSURE: 136 MMHG | OXYGEN SATURATION: 100 % | DIASTOLIC BLOOD PRESSURE: 84 MMHG | WEIGHT: 149.19 LBS | HEART RATE: 80 BPM

## 2023-12-07 DIAGNOSIS — I10 PRIMARY HYPERTENSION: ICD-10-CM

## 2023-12-07 DIAGNOSIS — Z00.00 ROUTINE HEALTH MAINTENANCE: ICD-10-CM

## 2023-12-07 DIAGNOSIS — D50.9 MICROCYTIC ANEMIA: Primary | ICD-10-CM

## 2023-12-07 DIAGNOSIS — E55.9 VITAMIN D DEFICIENCY: ICD-10-CM

## 2023-12-07 PROCEDURE — 3075F SYST BP GE 130 - 139MM HG: CPT | Performed by: INTERNAL MEDICINE

## 2023-12-07 PROCEDURE — 3079F DIAST BP 80-89 MM HG: CPT | Performed by: INTERNAL MEDICINE

## 2024-02-06 PROCEDURE — 87624 HPV HI-RISK TYP POOLED RSLT: CPT | Performed by: CLINICAL MEDICAL LABORATORY

## 2024-02-06 PROCEDURE — 88175 CYTOPATH C/V AUTO FLUID REDO: CPT | Performed by: CLINICAL MEDICAL LABORATORY

## 2024-02-07 ENCOUNTER — LAB REQUISITION (OUTPATIENT)
Dept: LAB | Age: 39
End: 2024-02-07

## 2024-02-07 DIAGNOSIS — N94.89 OTHER SPECIFIED CONDITIONS ASSOCIATED WITH FEMALE GENITAL ORGANS AND MENSTRUAL CYCLE: ICD-10-CM

## 2024-02-07 DIAGNOSIS — R19.04 LEFT LOWER QUADRANT ABDOMINAL SWELLING, MASS AND LUMP: ICD-10-CM

## 2024-02-07 DIAGNOSIS — C56.1 MALIGNANT NEOPLASM OF RIGHT OVARY (CMD): ICD-10-CM

## 2024-02-08 RX ORDER — ESCITALOPRAM OXALATE 5 MG/1
5 TABLET ORAL DAILY
Qty: 90 TABLET | Refills: 3 | Status: SHIPPED | OUTPATIENT
Start: 2024-02-08

## 2024-02-08 NOTE — TELEPHONE ENCOUNTER
Refill request is for a maintenance medication and has met the criteria specified in the Ambulatory Medication Refill Standing Order for eligibility, visits, laboratory, alerts and was sent to the requested pharmacy.    Requested Prescriptions     Signed Prescriptions Disp Refills    ESCITALOPRAM 5 MG Oral Tab 90 tablet 3     Sig: TAKE 1 TABLET(5 MG) BY MOUTH DAILY     Authorizing Provider: ADALID VERMA     Ordering User: ANDRZEJ MOON

## 2024-02-12 LAB
CASE RPRT: NORMAL
CLINICAL INFO: NORMAL
CYTOLOGY CVX/VAG STUDY: NORMAL
HPV16+18+45 E6+E7MRNA CVX NAA+PROBE: NEGATIVE
Lab: NORMAL
PAP EDUCATIONAL NOTE: NORMAL
SPECIMEN ADEQUACY: NORMAL

## 2024-02-20 ENCOUNTER — TELEPHONE (OUTPATIENT)
Dept: OBGYN CLINIC | Facility: CLINIC | Age: 39
End: 2024-02-20

## 2024-03-07 ENCOUNTER — HOSPITAL ENCOUNTER (OUTPATIENT)
Dept: MAMMOGRAPHY | Age: 39
Discharge: HOME | End: 2024-03-07
Attending: INTERNAL MEDICINE
Payer: COMMERCIAL

## 2024-03-07 DIAGNOSIS — Z12.31 SCREENING MAMMOGRAM FOR BREAST CANCER: ICD-10-CM

## 2024-03-07 PROCEDURE — 77067 SCR MAMMO BI INCL CAD: CPT | Performed by: INTERNAL MEDICINE

## 2024-03-07 PROCEDURE — 77063 BREAST TOMOSYNTHESIS BI: CPT | Performed by: INTERNAL MEDICINE

## 2024-05-08 ENCOUNTER — OFFICE VISIT (OUTPATIENT)
Dept: INTERNAL MEDICINE CLINIC | Facility: CLINIC | Age: 39
End: 2024-05-08

## 2024-05-08 VITALS
SYSTOLIC BLOOD PRESSURE: 130 MMHG | HEIGHT: 64 IN | WEIGHT: 149.5 LBS | DIASTOLIC BLOOD PRESSURE: 82 MMHG | HEART RATE: 72 BPM | BODY MASS INDEX: 25.52 KG/M2 | OXYGEN SATURATION: 100 %

## 2024-05-08 DIAGNOSIS — I10 PRIMARY HYPERTENSION: Primary | ICD-10-CM

## 2024-05-08 PROCEDURE — 3079F DIAST BP 80-89 MM HG: CPT | Performed by: INTERNAL MEDICINE

## 2024-05-08 PROCEDURE — 3075F SYST BP GE 130 - 139MM HG: CPT | Performed by: INTERNAL MEDICINE

## 2024-05-08 PROCEDURE — 3008F BODY MASS INDEX DOCD: CPT | Performed by: INTERNAL MEDICINE

## 2024-05-08 RX ORDER — LOSARTAN POTASSIUM 25 MG/1
25 TABLET ORAL DAILY
Qty: 90 TABLET | Refills: 3 | Status: SHIPPED | OUTPATIENT
Start: 2024-05-08

## 2024-05-08 NOTE — PROGRESS NOTES
Enma Pastrana is a 39 year old female.  Chief Complaint   Patient presents with    Checkup     High Blood Pressure followup after stopping Amlodipine last week. Pressures 102-109 systolic. Patient brought Blood Pressure readings with her today.      HPI:       Posterior HA a couple weeks ago. Feeling tired.  SBP's were low 106-109/70's.   Really tired last week after being out with her daughter.  .  Stopped amlodipine 1 week ago.   Feels much better since stopping amlodipine.  JITENDRA used to do the cooking -- high sodium.  Now Enma cooks -- much healthier. Thinks the lower sodium food may have contributed to the lower BP's    BP's now 110's-120's/80-92      Current Outpatient Medications   Medication Sig Dispense Refill    ESCITALOPRAM 5 MG Oral Tab TAKE 1 TABLET(5 MG) BY MOUTH DAILY 90 tablet 3      Past Medical History:    Allergic rhinitis    Anemia    Anesthesia complication    difficult waking up    Double cervix    right cervix is blind pouch, left cervix is patent, no bicornuate uterus    Essential hypertension    Exposure to TB    History of abnormal cervical Pap smear    at age 21    History of chicken pox    in childhood     Infertility, female    evaluation with R cervix blind pouch & L cervix patent on HSG (NOT bicornuate per care everywhere)    Migraines    Seasonal allergies    Visual impairment    [place] teeth extracted      Social History:  Social History     Socioeconomic History    Marital status:    Tobacco Use    Smoking status: Never    Smokeless tobacco: Never   Vaping Use    Vaping status: Never Used   Substance and Sexual Activity    Alcohol use: Not Currently     Comment: about 1 glass of wine per week    Drug use: No    Sexual activity: Not Currently     Partners: Male   Other Topics Concern    Caffeine Concern Yes     Comment: 1-2 cups of coffee a day     Exercise Yes     Comment: walking     Seat Belt Yes     Social Determinants of Health     Physical Activity:  Insufficiently Active (9/3/2020)    Received from [facility]    Exercise Vital Sign     Days of Exercise per Week: 7 days     Minutes of Exercise per Session: 20 min        REVIEW OF SYSTEMS:   GENERAL HEALTH: feels well otherwise  RESPIRATORY: no SOB  CARDIOVASCULAR: no chest pain/pressure  GI: no nausea, vomiting, diarrhea    Wt Readings from Last 5 Encounters:   05/08/24 149 lb 8 oz (67.8 kg)   12/07/23 149 lb 3.2 oz (67.7 kg)   02/08/22 143 lb (64.9 kg)   12/15/21 143 lb 4.8 oz (65 kg)   12/08/21 143 lb (64.9 kg)     Body mass index is 25.66 kg/m².      EXAM:   /82   Pulse 72   Ht 5' 4\" (1.626 m)   Wt 149 lb 8 oz (67.8 kg)   SpO2 100%   BMI 25.66 kg/m²   GENERAL: well developed, well nourished, in no apparent distress  NECK: supple, no adenopathy, no bruits  LUNGS: clear to auscultation  CARDIO: RRR, normal S1S2, without murmur or gallop  EXTREMITIES: no cyanosis, clubbing or edema    ASSESSMENT AND PLAN:     Hypertension  -hypotensive and lightheaded w/amlodipine 5mg/day  -diastolic still high  -tolerated losartan in the past  -start low dose losartan 25mg/day  -will titrate up as needed; goal BP <130/80  -check BMP in a few weeks (will do with Dr. Houser' labs next month)    The patient indicates understanding of these issues and agrees to the plan.    Isidra Gao MD, 05/08/24, 3:17 PM

## 2024-06-11 ENCOUNTER — HOSPITAL ENCOUNTER (OUTPATIENT)
Dept: ULTRASOUND IMAGING | Facility: HOSPITAL | Age: 39
Discharge: HOME | End: 2024-06-11
Attending: OBSTETRICS & GYNECOLOGY
Payer: COMMERCIAL

## 2024-06-11 DIAGNOSIS — C56.1 OVARIAN CANCER ON RIGHT (HCC): ICD-10-CM

## 2024-06-11 PROCEDURE — 76830 TRANSVAGINAL US NON-OB: CPT | Performed by: OBSTETRICS & GYNECOLOGY

## 2024-06-11 PROCEDURE — 76856 US EXAM PELVIC COMPLETE: CPT | Performed by: OBSTETRICS & GYNECOLOGY

## 2024-07-25 ENCOUNTER — VIRTUAL PHONE E/M (OUTPATIENT)
Dept: INTERNAL MEDICINE CLINIC | Facility: CLINIC | Age: 39
End: 2024-07-25

## 2024-07-25 DIAGNOSIS — I10 PRIMARY HYPERTENSION: Primary | ICD-10-CM

## 2024-07-25 DIAGNOSIS — F41.9 MILD ANXIETY: ICD-10-CM

## 2024-07-25 RX ORDER — LOSARTAN POTASSIUM 50 MG/1
50 TABLET ORAL DAILY
Qty: 90 TABLET | Refills: 3 | Status: SHIPPED | OUTPATIENT
Start: 2024-07-25

## 2024-07-25 RX ORDER — ESCITALOPRAM OXALATE 10 MG/1
10 TABLET ORAL DAILY
Qty: 90 TABLET | Refills: 3 | Status: SHIPPED | OUTPATIENT
Start: 2024-07-25

## 2024-07-25 NOTE — PROGRESS NOTES
Virtual Telephone Check-In    Enma Pastrana verbally consents to a Virtual/Telephone Check-In visit on 07/25/24.  Patient has been referred to the [facility] website at [email or web address] to review the yearly Consent to Treat document.    Patient understands and accepts financial responsibility for any deductible, co-insurance and/or co-pays associated with this service.    Duration of the service: 10 minutes      Summary of topics discussed:       Increased lexapro to 10mg with some improvement in stress/anxiety.  Would like to continue that dose    BP's are 110's-120's/85-90.  If she doesn't drink enough water, she feels lightheaded  Taking the losartan (25mg) at night.  Drinks 24 oz of water every morning on the way to work.  Eats very low salt diet.     A/P:    Hypertension  -systolics very good; diastolics still high  -increase losartan to 50mg/day  -can liberalize sodium somewhat   -cont to push fluids  -BMP in 2 weeks; call with BP's    Mild anxiety  -improved with increased lexapro 10mg/day      Isidra Gao MD

## 2024-08-16 ENCOUNTER — LAB ENCOUNTER (OUTPATIENT)
Dept: LAB | Age: 39
End: 2024-08-16
Attending: INTERNAL MEDICINE
Payer: COMMERCIAL

## 2024-08-16 ENCOUNTER — TELEPHONE (OUTPATIENT)
Dept: INTERNAL MEDICINE CLINIC | Facility: CLINIC | Age: 39
End: 2024-08-16

## 2024-08-16 DIAGNOSIS — C56.9 MALIGNANT NEOPLASM OF OVARY, UNSPECIFIED LATERALITY (HCC): Primary | ICD-10-CM

## 2024-08-16 DIAGNOSIS — I10 PRIMARY HYPERTENSION: ICD-10-CM

## 2024-08-16 DIAGNOSIS — C56.9 MALIGNANT NEOPLASM OF OVARY, UNSPECIFIED LATERALITY (HCC): ICD-10-CM

## 2024-08-16 LAB
ANION GAP SERPL CALC-SCNC: 5 MMOL/L (ref 0–18)
BUN BLD-MCNC: 16 MG/DL (ref 9–23)
BUN/CREAT SERPL: 21.3 (ref 10–20)
CALCIUM BLD-MCNC: 9.6 MG/DL (ref 8.7–10.4)
CANCER AG125 SERPL-ACNC: 12 U/ML (ref ?–30.2)
CHLORIDE SERPL-SCNC: 108 MMOL/L (ref 98–112)
CO2 SERPL-SCNC: 27 MMOL/L (ref 21–32)
CREAT BLD-MCNC: 0.75 MG/DL
EGFRCR SERPLBLD CKD-EPI 2021: 103 ML/MIN/1.73M2 (ref 60–?)
GLUCOSE BLD-MCNC: 79 MG/DL (ref 70–99)
OSMOLALITY SERPL CALC.SUM OF ELEC: 290 MOSM/KG (ref 275–295)
POTASSIUM SERPL-SCNC: 4.7 MMOL/L (ref 3.5–5.1)
SODIUM SERPL-SCNC: 140 MMOL/L (ref 136–145)

## 2024-08-16 PROCEDURE — 36415 COLL VENOUS BLD VENIPUNCTURE: CPT

## 2024-08-16 PROCEDURE — 80048 BASIC METABOLIC PNL TOTAL CA: CPT

## 2024-08-16 PROCEDURE — 86304 IMMUNOASSAY TUMOR CA 125: CPT

## 2024-08-21 ENCOUNTER — ORDER TRANSCRIPTION (OUTPATIENT)
Dept: ADMINISTRATIVE | Facility: HOSPITAL | Age: 39
End: 2024-08-21

## 2024-08-21 DIAGNOSIS — Z85.43 PERSONAL HISTORY OF OVARIAN CANCER: ICD-10-CM

## 2024-08-21 DIAGNOSIS — C56.1 OVARIAN CANCER ON RIGHT (HCC): Primary | ICD-10-CM

## 2024-08-21 DIAGNOSIS — N83.8 OVARIAN MASS: ICD-10-CM

## 2024-09-06 ENCOUNTER — TELEPHONE (OUTPATIENT)
Dept: OBGYN CLINIC | Facility: CLINIC | Age: 39
End: 2024-09-06

## 2024-09-06 NOTE — TELEPHONE ENCOUNTER
Received fax, Follow up notes dated 8/21/24 from Dr Houser. Noted to Dr. Cyndee lane for review.

## 2024-10-14 ENCOUNTER — IMMUNIZATION (OUTPATIENT)
Dept: LAB | Age: 39
End: 2024-10-14
Attending: EMERGENCY MEDICINE
Payer: COMMERCIAL

## 2024-10-14 DIAGNOSIS — Z23 NEED FOR VACCINATION: Primary | ICD-10-CM

## 2024-10-14 PROCEDURE — 90480 ADMN SARSCOV2 VAC 1/ONLY CMP: CPT

## 2024-10-30 ENCOUNTER — HOSPITAL ENCOUNTER (OUTPATIENT)
Dept: ULTRASOUND IMAGING | Facility: HOSPITAL | Age: 39
Discharge: HOME | End: 2024-10-30
Attending: PHYSICIAN ASSISTANT
Payer: COMMERCIAL

## 2024-10-30 DIAGNOSIS — Z85.43 PERSONAL HISTORY OF OVARIAN CANCER: ICD-10-CM

## 2024-10-30 DIAGNOSIS — C56.1 OVARIAN CANCER ON RIGHT (HCC): ICD-10-CM

## 2024-10-30 DIAGNOSIS — N83.8 OVARIAN MASS: ICD-10-CM

## 2024-10-30 PROCEDURE — 76856 US EXAM PELVIC COMPLETE: CPT | Performed by: PHYSICIAN ASSISTANT

## 2024-10-30 PROCEDURE — 76830 TRANSVAGINAL US NON-OB: CPT | Performed by: PHYSICIAN ASSISTANT

## 2024-11-11 ENCOUNTER — TELEPHONE (OUTPATIENT)
Dept: OBGYN CLINIC | Facility: CLINIC | Age: 39
End: 2024-11-11

## 2024-11-11 NOTE — TELEPHONE ENCOUNTER
Received via fax from Dr. Houser follow up office notes with DOS: 11/6/2024. Placed on Dr. Cotter desk for review and recommendations.

## 2024-12-02 RX ORDER — AMLODIPINE BESYLATE 5 MG/1
TABLET ORAL
Qty: 90 TABLET | Refills: 3 | OUTPATIENT
Start: 2024-12-02

## 2024-12-02 NOTE — TELEPHONE ENCOUNTER
Discontinued by MD     Current refill request refused due to refill is either a duplicate request or has active refills at the pharmacy.  Check previous templates.    Requested Prescriptions     Refused Prescriptions Disp Refills    AMLODIPINE 5 MG Oral Tab [Pharmacy Med Name: AMLODIPINE BESYLATE 5MG TABLETS] 90 tablet 3     Sig: TAKE 1 TABLET(5 MG) BY MOUTH DAILY     Refused By: VINNY LANGLEY     Reason for Refusal: Refill not appropriate

## 2024-12-06 ENCOUNTER — HOSPITAL ENCOUNTER (OUTPATIENT)
Dept: MRI IMAGING | Age: 39
Discharge: HOME | End: 2024-12-06
Payer: COMMERCIAL

## 2024-12-06 DIAGNOSIS — N83.292 COMPLEX CYST OF LEFT OVARY: ICD-10-CM

## 2024-12-06 DIAGNOSIS — N88.8 CERVICAL CYST: ICD-10-CM

## 2024-12-06 DIAGNOSIS — Z85.43 PERSONAL HISTORY OF MALIGNANT NEOPLASM OF OVARY: ICD-10-CM

## 2024-12-06 DIAGNOSIS — N83.291 COMPLEX CYST OF RIGHT OVARY: ICD-10-CM

## 2024-12-06 PROCEDURE — 72197 MRI PELVIS W/O & W/DYE: CPT

## 2024-12-06 PROCEDURE — A9575 INJ GADOTERATE MEGLUMI 0.1ML: HCPCS

## 2024-12-06 RX ORDER — GADOTERATE MEGLUMINE 376.9 MG/ML
15 INJECTION INTRAVENOUS
Status: COMPLETED | OUTPATIENT
Start: 2024-12-06 | End: 2024-12-06

## 2024-12-06 RX ADMIN — GADOTERATE MEGLUMINE 14 ML: 376.9 INJECTION INTRAVENOUS at 17:13:00

## 2024-12-17 ENCOUNTER — LAB ENCOUNTER (OUTPATIENT)
Dept: LAB | Age: 39
End: 2024-12-17
Attending: OBSTETRICS & GYNECOLOGY
Payer: COMMERCIAL

## 2024-12-17 DIAGNOSIS — N83.291 COMPLEX CYST OF RIGHT OVARY: ICD-10-CM

## 2024-12-17 DIAGNOSIS — N83.292 OTHER OVARIAN CYST, LEFT SIDE: ICD-10-CM

## 2024-12-17 DIAGNOSIS — Z85.43 PERSONAL HISTORY OF MALIGNANT NEOPLASM OF OVARY: ICD-10-CM

## 2024-12-17 DIAGNOSIS — N88.8 NABOTHIAN GLAND CYST: Primary | ICD-10-CM

## 2024-12-17 LAB
AFP-TM SERPL-MCNC: 3.2 NG/ML
B-HCG SERPL-ACNC: <2.6 MIU/ML
CANCER AG19-9 SERPL-ACNC: 15.3 U/ML (ref ?–35)
CEA SERPL-MCNC: 0.5 NG/ML (ref ?–5)
LDH SERPL L TO P-CCNC: 193 U/L

## 2024-12-17 PROCEDURE — 82105 ALPHA-FETOPROTEIN SERUM: CPT

## 2024-12-17 PROCEDURE — 86301 IMMUNOASSAY TUMOR CA 19-9: CPT

## 2024-12-17 PROCEDURE — 84702 CHORIONIC GONADOTROPIN TEST: CPT

## 2024-12-17 PROCEDURE — 86336 INHIBIN A: CPT

## 2024-12-17 PROCEDURE — 36415 COLL VENOUS BLD VENIPUNCTURE: CPT

## 2024-12-17 PROCEDURE — 82378 CARCINOEMBRYONIC ANTIGEN: CPT

## 2024-12-17 PROCEDURE — 83615 LACTATE (LD) (LDH) ENZYME: CPT

## 2024-12-17 PROCEDURE — 83520 IMMUNOASSAY QUANT NOS NONAB: CPT

## 2024-12-20 LAB — INHIBIN B: 81.1 PG/ML

## 2024-12-21 LAB — INHIBIN A ULTRASEN: 32.4 PG/ML

## 2025-02-05 ENCOUNTER — HOSPITAL ENCOUNTER (OUTPATIENT)
Dept: ULTRASOUND IMAGING | Age: 40
Discharge: HOME | End: 2025-02-05
Attending: PHYSICIAN ASSISTANT
Payer: COMMERCIAL

## 2025-02-05 ENCOUNTER — TELEPHONE (OUTPATIENT)
Dept: INTERNAL MEDICINE CLINIC | Facility: CLINIC | Age: 40
End: 2025-02-05

## 2025-02-05 DIAGNOSIS — Z00.00 ROUTINE HEALTH MAINTENANCE: Primary | ICD-10-CM

## 2025-02-05 DIAGNOSIS — D50.9 MICROCYTIC ANEMIA: ICD-10-CM

## 2025-02-05 DIAGNOSIS — Z12.31 SCREENING MAMMOGRAM FOR BREAST CANCER: ICD-10-CM

## 2025-02-05 DIAGNOSIS — Z90.721 STATUS POST UNILATERAL SALPINGO-OOPHORECTOMY: ICD-10-CM

## 2025-02-05 DIAGNOSIS — E55.9 VITAMIN D DEFICIENCY: ICD-10-CM

## 2025-02-05 DIAGNOSIS — C56.9 OVARIAN CANCER (HCC): ICD-10-CM

## 2025-02-05 DIAGNOSIS — Z90.79 ACQUIRED ABSENCE OF ORGAN, GENITAL ORGANS: ICD-10-CM

## 2025-02-05 DIAGNOSIS — N83.202 OVARIAN CYST, LEFT: ICD-10-CM

## 2025-02-05 DIAGNOSIS — I10 PRIMARY HYPERTENSION: ICD-10-CM

## 2025-02-05 PROCEDURE — 76856 US EXAM PELVIC COMPLETE: CPT | Performed by: PHYSICIAN ASSISTANT

## 2025-02-05 PROCEDURE — 76830 TRANSVAGINAL US NON-OB: CPT | Performed by: PHYSICIAN ASSISTANT

## 2025-02-06 DIAGNOSIS — Z01.818 PREOP EXAMINATION: Primary | ICD-10-CM

## 2025-02-11 RX ORDER — ESCITALOPRAM OXALATE 5 MG/1
5 TABLET ORAL DAILY
Qty: 90 TABLET | Refills: 3 | OUTPATIENT
Start: 2025-02-11

## 2025-02-11 NOTE — TELEPHONE ENCOUNTER
Per 7/25/2024 virtual visit note, escitalopram increased to 10mg daily and new Rx was sent-in at that time with enough refills for one year.     Refill request denied.     Requested Prescriptions     Refused Prescriptions Disp Refills    ESCITALOPRAM 5 MG Oral Tab [Pharmacy Med Name: ESCITALOPRAM 5MG TABLETS] 90 tablet 3     Sig: TAKE 1 TABLET(5 MG) BY MOUTH DAILY     Refused By: KANDIS APARICIO     Reason for Refusal: Refill not appropriate

## 2025-02-19 ENCOUNTER — LAB ENCOUNTER (OUTPATIENT)
Dept: LAB | Age: 40
End: 2025-02-19
Attending: INTERNAL MEDICINE
Payer: COMMERCIAL

## 2025-02-19 DIAGNOSIS — D50.9 MICROCYTIC ANEMIA: ICD-10-CM

## 2025-02-19 DIAGNOSIS — E55.9 VITAMIN D DEFICIENCY: ICD-10-CM

## 2025-02-19 DIAGNOSIS — Z00.00 ROUTINE HEALTH MAINTENANCE: ICD-10-CM

## 2025-02-19 DIAGNOSIS — I10 PRIMARY HYPERTENSION: ICD-10-CM

## 2025-02-19 DIAGNOSIS — Z01.818 PREOP EXAMINATION: ICD-10-CM

## 2025-02-19 LAB
ALBUMIN SERPL-MCNC: 4.6 G/DL (ref 3.2–4.8)
ALBUMIN/GLOB SERPL: 1.5 {RATIO} (ref 1–2)
ALP LIVER SERPL-CCNC: 48 U/L
ALT SERPL-CCNC: 14 U/L
ANION GAP SERPL CALC-SCNC: 9 MMOL/L (ref 0–18)
APTT PPP: 24.2 SECONDS (ref 23–36)
AST SERPL-CCNC: 18 U/L (ref ?–34)
BASOPHILS # BLD AUTO: 0.06 X10(3) UL (ref 0–0.2)
BASOPHILS NFR BLD AUTO: 1 %
BILIRUB SERPL-MCNC: 0.4 MG/DL (ref 0.3–1.2)
BUN BLD-MCNC: 17 MG/DL (ref 9–23)
BUN/CREAT SERPL: 20 (ref 10–20)
CALCIUM BLD-MCNC: 9.3 MG/DL (ref 8.7–10.4)
CHLORIDE SERPL-SCNC: 103 MMOL/L (ref 98–112)
CHOLEST SERPL-MCNC: 186 MG/DL (ref ?–200)
CO2 SERPL-SCNC: 27 MMOL/L (ref 21–32)
CREAT BLD-MCNC: 0.85 MG/DL
DEPRECATED RDW RBC AUTO: 39.4 FL (ref 35.1–46.3)
EGFRCR SERPLBLD CKD-EPI 2021: 88 ML/MIN/1.73M2 (ref 60–?)
EOSINOPHIL # BLD AUTO: 0.18 X10(3) UL (ref 0–0.7)
EOSINOPHIL NFR BLD AUTO: 2.9 %
ERYTHROCYTE [DISTWIDTH] IN BLOOD BY AUTOMATED COUNT: 12.8 % (ref 11–15)
FASTING PATIENT LIPID ANSWER: YES
FASTING STATUS PATIENT QL REPORTED: YES
GLOBULIN PLAS-MCNC: 3 G/DL (ref 2–3.5)
GLUCOSE BLD-MCNC: 90 MG/DL (ref 70–99)
HCT VFR BLD AUTO: 39.5 %
HDLC SERPL-MCNC: 64 MG/DL (ref 40–59)
HGB BLD-MCNC: 12.4 G/DL
IMM GRANULOCYTES # BLD AUTO: 0.01 X10(3) UL (ref 0–1)
IMM GRANULOCYTES NFR BLD: 0.2 %
INR BLD: 0.86 (ref 0.8–1.2)
LDLC SERPL CALC-MCNC: 111 MG/DL (ref ?–100)
LYMPHOCYTES # BLD AUTO: 2.42 X10(3) UL (ref 1–4)
LYMPHOCYTES NFR BLD AUTO: 39.3 %
MCH RBC QN AUTO: 26.8 PG (ref 26–34)
MCHC RBC AUTO-ENTMCNC: 31.4 G/DL (ref 31–37)
MCV RBC AUTO: 85.3 FL
MONOCYTES # BLD AUTO: 0.46 X10(3) UL (ref 0.1–1)
MONOCYTES NFR BLD AUTO: 7.5 %
NEUTROPHILS # BLD AUTO: 3.03 X10 (3) UL (ref 1.5–7.7)
NEUTROPHILS # BLD AUTO: 3.03 X10(3) UL (ref 1.5–7.7)
NEUTROPHILS NFR BLD AUTO: 49.1 %
NONHDLC SERPL-MCNC: 122 MG/DL (ref ?–130)
OSMOLALITY SERPL CALC.SUM OF ELEC: 289 MOSM/KG (ref 275–295)
PLATELET # BLD AUTO: 301 10(3)UL (ref 150–450)
POTASSIUM SERPL-SCNC: 4.2 MMOL/L (ref 3.5–5.1)
PROT SERPL-MCNC: 7.6 G/DL (ref 5.7–8.2)
PROTHROMBIN TIME: 12.2 SECONDS (ref 11.6–14.8)
RBC # BLD AUTO: 4.63 X10(6)UL
SODIUM SERPL-SCNC: 139 MMOL/L (ref 136–145)
TRIGL SERPL-MCNC: 60 MG/DL (ref 30–149)
TSI SER-ACNC: 1.28 UIU/ML (ref 0.55–4.78)
VIT D+METAB SERPL-MCNC: 39.1 NG/ML (ref 30–100)
VLDLC SERPL CALC-MCNC: 10 MG/DL (ref 0–30)
WBC # BLD AUTO: 6.2 X10(3) UL (ref 4–11)

## 2025-02-19 PROCEDURE — 80061 LIPID PANEL: CPT

## 2025-02-19 PROCEDURE — 80053 COMPREHEN METABOLIC PANEL: CPT

## 2025-02-19 PROCEDURE — 84443 ASSAY THYROID STIM HORMONE: CPT

## 2025-02-19 PROCEDURE — 85730 THROMBOPLASTIN TIME PARTIAL: CPT

## 2025-02-19 PROCEDURE — 85025 COMPLETE CBC W/AUTO DIFF WBC: CPT

## 2025-02-19 PROCEDURE — 82306 VITAMIN D 25 HYDROXY: CPT

## 2025-02-19 PROCEDURE — 36415 COLL VENOUS BLD VENIPUNCTURE: CPT

## 2025-02-19 PROCEDURE — 85610 PROTHROMBIN TIME: CPT

## 2025-02-20 ENCOUNTER — OFFICE VISIT (OUTPATIENT)
Dept: INTERNAL MEDICINE CLINIC | Facility: CLINIC | Age: 40
End: 2025-02-20

## 2025-02-20 ENCOUNTER — TELEPHONE (OUTPATIENT)
Dept: INTERNAL MEDICINE CLINIC | Facility: CLINIC | Age: 40
End: 2025-02-20

## 2025-02-20 VITALS
OXYGEN SATURATION: 98 % | DIASTOLIC BLOOD PRESSURE: 76 MMHG | HEIGHT: 64 IN | RESPIRATION RATE: 16 BRPM | SYSTOLIC BLOOD PRESSURE: 124 MMHG | TEMPERATURE: 98 F | WEIGHT: 160 LBS | BODY MASS INDEX: 27.31 KG/M2 | HEART RATE: 70 BPM

## 2025-02-20 DIAGNOSIS — Z01.818 PRE-OP EXAM: Primary | ICD-10-CM

## 2025-02-20 DIAGNOSIS — Z00.00 ROUTINE HEALTH MAINTENANCE: ICD-10-CM

## 2025-02-20 DIAGNOSIS — D39.11 OVARIAN TUMOR OF BORDERLINE MALIGNANCY, RIGHT: ICD-10-CM

## 2025-02-20 DIAGNOSIS — E55.9 VITAMIN D DEFICIENCY: ICD-10-CM

## 2025-02-20 DIAGNOSIS — I10 PRIMARY HYPERTENSION: ICD-10-CM

## 2025-02-20 DIAGNOSIS — D50.9 MICROCYTIC ANEMIA: ICD-10-CM

## 2025-02-20 LAB
ATRIAL RATE: 71 BPM
P AXIS: 41 DEGREES
P-R INTERVAL: 130 MS
Q-T INTERVAL: 406 MS
QRS DURATION: 72 MS
QTC CALCULATION (BEZET): 441 MS
R AXIS: 69 DEGREES
T AXIS: 36 DEGREES
VENTRICULAR RATE: 71 BPM

## 2025-02-20 PROCEDURE — 93000 ELECTROCARDIOGRAM COMPLETE: CPT | Performed by: INTERNAL MEDICINE

## 2025-02-20 PROCEDURE — 3074F SYST BP LT 130 MM HG: CPT | Performed by: INTERNAL MEDICINE

## 2025-02-20 PROCEDURE — 3078F DIAST BP <80 MM HG: CPT | Performed by: INTERNAL MEDICINE

## 2025-02-20 PROCEDURE — 3008F BODY MASS INDEX DOCD: CPT | Performed by: INTERNAL MEDICINE

## 2025-02-20 RX ORDER — LOSARTAN POTASSIUM 50 MG/1
50 TABLET ORAL DAILY
Qty: 90 TABLET | Refills: 3 | Status: SHIPPED | OUTPATIENT
Start: 2025-02-20

## 2025-02-20 RX ORDER — LOSARTAN POTASSIUM 50 MG/1
50 TABLET ORAL DAILY
COMMUNITY
Start: 2025-02-20 | End: 2025-02-20

## 2025-02-20 RX ORDER — MELATONIN
1000 DAILY
COMMUNITY

## 2025-02-20 NOTE — PROGRESS NOTES
Enma Pastrana is a 40 year old female.  Chief Complaint   Patient presents with    Pre-Op Exam     3/14 Gavin     HPI:     Physical and pre-op eval for LSO by Dr. Houser on 3/14/25.  No CP or SOB.  Easily able to walk several blocks and climb 2 flights of stairs    Stopped lexapro due to weight gain  Getting back to exercise    Current Outpatient Medications   Medication Sig Dispense Refill    Cholecalciferol 125 MCG (5000 UT) Oral Tab Take 1 tablet (5,000 Units total) by mouth daily.      cyanocobalamin 1000 MCG Oral Tab Take 1 tablet (1,000 mcg total) by mouth daily.      escitalopram 10 MG Oral Tab Take 1 tablet (10 mg total) by mouth daily. 90 tablet 3    losartan 50 MG Oral Tab Take 1 tablet (50 mg total) by mouth daily. (Patient not taking: Reported on 2/20/2025) 90 tablet 3      Past Medical History:    Allergic rhinitis    Anemia    Anesthesia complication    difficult waking up    Double cervix    right cervix is blind pouch, left cervix is patent, no bicornuate uterus    Essential hypertension    Exposure to TB    History of abnormal cervical Pap smear    at age 21    History of chicken pox    in childhood     Infertility, female    evaluation with R cervix blind pouch & L cervix patent on HSG (NOT bicornuate per care everywhere)    Migraines    Seasonal allergies    Visual impairment    [place] teeth extracted      Social History:  Social History     Socioeconomic History    Marital status:    Tobacco Use    Smoking status: Never    Smokeless tobacco: Never   Vaping Use    Vaping status: Never Used   Substance and Sexual Activity    Alcohol use: Not Currently     Comment: about 1 glass of wine per week    Drug use: No    Sexual activity: Not Currently     Partners: Male   Other Topics Concern    Caffeine Concern Yes     Comment: 1-2 cups of coffee a day     Exercise Yes     Comment: walking     Seat Belt Yes        REVIEW OF SYSTEMS:   GENERAL HEALTH: feels well otherwise  RESPIRATORY: no  SOB  CARDIOVASCULAR: no chest pain/pressure  GI: no nausea, vomiting, diarrhea    Wt Readings from Last 5 Encounters:   02/20/25 160 lb (72.6 kg)   05/08/24 149 lb 8 oz (67.8 kg)   12/07/23 149 lb 3.2 oz (67.7 kg)   02/08/22 143 lb (64.9 kg)   12/15/21 143 lb 4.8 oz (65 kg)     Body mass index is 27.46 kg/m².      EXAM:   /76   Pulse 70   Temp 97.9 °F (36.6 °C) (Oral)   Resp 16   Ht 5' 4\" (1.626 m)   Wt 160 lb (72.6 kg)   SpO2 98%   BMI 27.46 kg/m²   GENERAL: well developed, well nourished, in no apparent distress    NECK: supple, no adenopathy, no bruits  LUNGS: clear to auscultation  CARDIO: RRR, normal S1S2, without murmur or gallop  GI: soft, NT, ND, NABS  EXTREMITIES: no LE edema, +2 DP pulses  BREASTS: no masses    ASSESSMENT AND PLAN:     Pre-op exam  Borderline serous ovarian tumor  -asymptomatic from a cardiopulmonary standpoint  -good exercise tolerance, easily able to achieve 4 METs  -labs normal  -EKG normal  -pt deemed low risk for left salpingo-oophorectomy    Routine health maintenance  Sees Dr. Cyndee reynoso (and gyne-onc Dr. Houser); UTD on Paps  To sched mammo  UTD on vaccines  LDL up slightly (111); cont w/diet/exercise     Borderline serous tumor (right ovary)  -S/p right salpingo-oophorectomy on 8/18/17 (borderline serous tumor)  -Sees Dr. Houser, gyne onc.  -genetic testing at [facility] -- very low risk for contralateral cystadenoma (on left) was <1%; was told she does not need monitoring.     Iron deficiency anemia  Hgb normalized; cont iron as tolerated  Has IUD so rare menses     Vitamin D deficiency  Cont vit D  Level normal     Subclinical hyperthyroidism  TSH normalized     Mild anxiety  Stopped lexapro 2/2 wt gain  stable     Hypertension  -stopped amlodipine due to low BP  -on losartan 50mg/day      The patient indicates understanding of these issues and agrees to the plan.    Isidra Gao MD, 02/20/25, 2:46 PM

## 2025-02-21 NOTE — TELEPHONE ENCOUNTER
Office visit notes notes from 2/20/25 along with lab results, and EKG faxed to Dr Houser and confirmation received.  Pre Op packet left at Dr Pastrana's pre op folder.

## 2025-03-18 ENCOUNTER — HOSPITAL ENCOUNTER (OUTPATIENT)
Dept: GENERAL RADIOLOGY | Age: 40
Discharge: HOME | End: 2025-03-18
Attending: OBSTETRICS & GYNECOLOGY
Payer: COMMERCIAL

## 2025-03-18 ENCOUNTER — HOSPITAL ENCOUNTER (OUTPATIENT)
Dept: MAMMOGRAPHY | Age: 40
Discharge: HOME | End: 2025-03-18
Attending: INTERNAL MEDICINE
Payer: COMMERCIAL

## 2025-03-18 DIAGNOSIS — Z01.818 PREOP TESTING: ICD-10-CM

## 2025-03-18 DIAGNOSIS — Z90.721 HISTORY OF RIGHT SALPINGO-OOPHORECTOMY: ICD-10-CM

## 2025-03-18 DIAGNOSIS — Z90.79: ICD-10-CM

## 2025-03-18 DIAGNOSIS — Z85.43 PERSONAL HISTORY OF OVARIAN CANCER: ICD-10-CM

## 2025-03-18 DIAGNOSIS — Z90.79 HISTORY OF RIGHT SALPINGO-OOPHORECTOMY: ICD-10-CM

## 2025-03-18 DIAGNOSIS — N83.8 OVARIAN MASS: ICD-10-CM

## 2025-03-18 DIAGNOSIS — C56.1 OVARIAN CANCER, RIGHT (HCC): ICD-10-CM

## 2025-03-18 DIAGNOSIS — G54.2 CERVICAL ROOT LESION: ICD-10-CM

## 2025-03-18 DIAGNOSIS — Z12.31 SCREENING MAMMOGRAM FOR BREAST CANCER: ICD-10-CM

## 2025-03-18 DIAGNOSIS — N83.202 LEFT OVARIAN CYST: ICD-10-CM

## 2025-03-18 DIAGNOSIS — D64.9 ANEMIA: ICD-10-CM

## 2025-03-18 PROCEDURE — 71046 X-RAY EXAM CHEST 2 VIEWS: CPT | Performed by: OBSTETRICS & GYNECOLOGY

## 2025-03-18 PROCEDURE — 77063 BREAST TOMOSYNTHESIS BI: CPT | Performed by: INTERNAL MEDICINE

## 2025-03-18 PROCEDURE — 77067 SCR MAMMO BI INCL CAD: CPT | Performed by: INTERNAL MEDICINE

## 2025-03-19 ENCOUNTER — LAB ENCOUNTER (OUTPATIENT)
Dept: LAB | Facility: HOSPITAL | Age: 40
End: 2025-03-19
Attending: OBSTETRICS & GYNECOLOGY
Payer: COMMERCIAL

## 2025-03-19 DIAGNOSIS — Z01.818 PRE-OP TESTING: ICD-10-CM

## 2025-03-19 LAB
ANTIBODY SCREEN: NEGATIVE
RH BLOOD TYPE: POSITIVE

## 2025-03-19 PROCEDURE — 86901 BLOOD TYPING SEROLOGIC RH(D): CPT

## 2025-03-19 PROCEDURE — 86850 RBC ANTIBODY SCREEN: CPT

## 2025-03-19 PROCEDURE — 86900 BLOOD TYPING SEROLOGIC ABO: CPT

## 2025-03-19 PROCEDURE — 36415 COLL VENOUS BLD VENIPUNCTURE: CPT

## 2025-03-20 NOTE — DISCHARGE INSTRUCTIONS
DISCHARGE INSTRUCTIONS  Robotic Removal of Ovaries    Please call the office ([phone]) within 48 hours of returning home to schedule or confirm a postoperative visit with your physician.    PAIN:  It is common for women to experience mild to moderate pain after they are discharged from the hospital. We expect that this pain should lessen with each day after surgery. You will receive a prescription for pain medicine at the time of your discharge. It is important to use pain medications as you need them, in order for you to be comfortable, to promote healing and to increase your daily activities.    Most commonly prescribed medications include:  Norco: You may take this medication every 4-6 hours as needed for pain. DO NOT combine this medication with Tylenol.  Tramadol: You may take this medication every 4-6 hours as needed for pain. It is typically given to patients who do not tolerate Norco or who have allergies to Norco.  Tylenol Regular Strength: 325mg every 6 hours as needed for pain. As you begin to feel better you can stop taking your prescription pain medication and take the Tylenol alone.  DO NOT take medications such as Motrin, aspirin or ibuprofen if you are taking other blood thinning medications (such as: Lovenox, Coumadin or Plavix).  Motrin: If you are not taking a prescribed blood thinning medication, you may take Motrin 400mg- 600mg every 6 hours as needed for pain.  You should not drive while taking narcotic pain medications.  Taking a warm shower or bath often helps to relieve pain as well.  As you begin to feel better you can stop taking your prescribed narcotic pain medication and take Tylenol alone.      Refilling your prescription medications:    Please keep in mind that prescription medications cannot be refilled after office hours or on the weekends. Please give the office 24-48 hours’ notice if you need a refill. Certain pain medications cannot be called into your pharmacy, these  prescriptions will need to be picked up at the office.    Call your doctor if: You are experiencing worsening pain or pain that is not relieved by  Medications.    BLOOD CLOTS:  You may be sent home on injectable medication blood thinners (Fragmin, Lovenox or Arixtra) to prevent blood clots from forming in your legs or pelvis after surgery.  You will be instructed on how to inject this medication into your thigh on a daily basis.  Never inject this medication anywhere near your abdominal incision.  Blood clots can move to different places in your body, such as your heart or lungs.  DO NOT take medications such as Motrin, Coumadin, aspirin or ibuprofen while taking your injectable blood thinning medications.  It is possible for patients that are considered “high risk” for developing blood clots to go home with up to a 1-2 week supply of injectable blood thinning medication.  Most importantly be active. Walk around during the daytime every 2 hours. This will help prevent blood clots from forming.      Call your doctor if: You are experiencing pain and unequal swelling in your claves, shortness of breath or chest pain.    NAUSEA:  Patients often experience nausea after surgery. This is often related to anesthesia and slow return of bowel function.    Commonly prescribed medications for nausea include:  Compazine: Take every 6 hours as needed for nausea.  Zofran: Take every 6 hours as needed for nausea.  Small meals frequent meals, slow sips of fluids and saltine crackers can also help to relieve nausea.    Call your doctor if: You are experiencing nausea and vomiting that is uncontrolled by medication.    BOWEL AND BLADDER FUNCTION:  Abdominal surgery can cause changes in bowel patterns. Pain medication can also cause constipation, although this is not a reason to stop taking your prescribed pain medication. It will be helpful to follow the tips below to avoid constipation after surgery.  Colace stool softener: It is  important to take a stool softener while on narcotic pain medication. You will be given a prescription for this at the time of your discharge. It is also available at your pharmacy without a prescription. Colace will NOT help you move your bowel. It will only help to keep your stool soft. It is important to take a stool softener as long as you are on pain medications. You may stop this medication if you are having loose stools.  For the first few days after surgery avoid high fiber foods as they are hard to digest. Once you are passing gas regularly you may add high fiber foods to your diet. Fresh fruits and bran cereals can help avoid constipation.  Prune juice and apricot nectar are also helpful to avoid constipation.  Drink plenty of fluids (at least 6-8 glasses of water or other non-caffeinated fluids daily).  Staying active will also promote good bowel activity. Walk around the house or take short walks outside.    If you are still unable to move your bowels you can use one of the following:  Dulcolax or Senna: are available without a prescription and can help stimulate your bowels.  Miralax: Can also be used daily until your bowel patterns have returned to normal.    Some women experience an increase in bowel motility after surgery- diarrhea.  Stop Colace stool softener.  Metamucil/ Citrucel: 1-2 teaspoons can be taken daily to slow down diarrhea.  If you have used Metamucil/ Citrucel for 2 days and diarrhea persists- please call the office.    Some women will get a urinary tract infection after surgery: The best way to avoid this is to drink plenty of fluids. Symptoms of a urinary tract infection include:  Pain with urination  Fever  Blood in your urine  Call your doctor if you are experiencing any of these symptoms.    Call the doctor if:  You have diarrhea that is not relieved by Metamucil/ Citrucel.  You have constipation that persists beyond 3 days after discharge from the hospital.  You experience any  rectal bleeding or blood in your urine.    ACTIVITY:  May not resume driving until: you have completed your post-operative doctor’s appointment, you can walk with ease and are no longer taking narcotic pain medication.  Gradually resume your daily activities. Take rest periods throughout the day.  Avoid sitting for long periods of time.  Avoid strenuous activities, heavy housework (vacuuming) and heavy lifting (greater that 5-10 pounds). A good rule of judgement is: if it weighs more than a gallon of milk or requires more than one hand to  an object, DO NOT LIFT IT for at least 4 weeks.  Climbing stairs is OK- just take them one at a time until you regain your strength.  It is important to stay active to reduce the risk of developing blood clots in your legs and lungs. We suggest that you take short walks (in the house or outside) at least every 1-2 hours during your waking hours. If you notice spotting or vaginal bleeding after activity, rest until it resolves.    Call the doctor if you experience any of the following symptoms:  Persistent fatigue prohibiting you from your daily activities  Shortness of breath  Chest pain  Swelling in one leg/foot more than the other  Calf pain    BLEEDING AND VAGINAL DISCHARGE:  DO NOT PLACE ANYTHING IN THE VAGINA. This includes intercourse, douching, tampons, etc.  Your doctor will let you know when you can resume normal vaginal activity.  Expect slight spotting for up to 2 weeks after surgery. This may be pink, red or brownish. There should be no offensive odor. You may wear a pad or panty liner until discharge resolves.    Call the doctor for:  Any heavy bleeding or bright red blood from the vagina. Soaking through more than one pad in one hour or if you are using more than 3 mini pads per day.  Temperature above 101.0° F (38.3° C) on two occasions 4 hours apart.  Any foul smelling discharge.    WOUND CARE:  You may shower daily. Avoid baths for the first 2 weeks after  surgery.  You do have stitches (sutures) at the top of your vagina. These sutures will dissolve and will fall out as they do. Do not be alarmed if you see small pieces of black or blue string.  You also have small sutures called Dermabond on the 3-5 laparoscopic sites on your belly. The sutures will dissolve on their own over the next few weeks. The sites do not require any type of bandage over it.    Call the doctor for:  Temperature above 101.0° F (38.3° C) on two occasions 4 hours apart.  You have any foul smelling (fishy) discharge.  Redness, swelling or warmth around incision sites that is increasing.  Any drainage from the incision that soaks a gauze pad.  You have pain which is not relieved by pain medication.  You have not moved your bowels for three days.  You have burning or pain with urination.    FOLLOW UP:  We plan to see you in the office within 7-10 days of your surgery asses your post-operative recovery and to discuss the pathology results from your surgery.  If you are not given an office appointment at the time of your discharge from the hospital, please call the office to schedule a post-operative appointment with your doctor at [phone].    CALLING THE DOCTOR  During office hours (M-F; 8am- 4:30pm) call: [phone] and ask to speak with the Nurse or Physician Assistant.  After office hours: call [phone] to reach the on-call physician. Please reserve evening and weekend phone calls for urgent matters only.  If you are receiving home healthcare, call your home healthcare nurse, who will assess your condition and call the doctor if necessary.  For emergencies call: [phone] or go immediately to the nearest Emergency Room.          HOME INSTRUCTIONS  [facility] HOME CARE INSTRUCTIONS: POST-OP ANESTHESIA  The medication that you received for sedation or general anesthesia can last up to 24 hours. Your judgment and reflexes may be altered, even if you feel like your normal self.      We Recommend:    Do not drive any motor vehicle or bicycle   Avoid mowing the lawn, playing sports, or working with power tools/applicances (power saws, electric knives or mixers)   That you have someone stay with you on your first night home   Do not drink alcohol or take sleeping pills or tranquilizers   Do not sign legal documents within 24 hours of your procedure   If you had a nerve block for your surgery, take extra care not to put any pressure on your arm or hand for 24 hours    It is normal:  For you to have a sore throat if you had a breathing tube during surgery (while you were asleep!). The sore throat should get better within 48 hours. You can gargle with warm salt water (1/2 tsp in 4 oz warm water) or use a throat lozenge for comfort  To feel muscle aches or soreness especially in the abdomen, chest or neck. The achy feeling should go away in the next 24 hours  To feel weak, sleepy or \"wiped out\". Your should start feeling better in the next 24 hours.   To experience mild discomforts such as sore lip or tongue, headache, cramps, gas pains or a bloated feeling in your abdomen.   To experience mild back pain or soreness for a day or two if you had spinal or epidural anesthesia.   If you had laparoscopic surgery, to feel shoulder pain or discomfort on the day of surgery.   For some patients to have nausea after surgery/anesthesia    If you feel nausea or experience vomiting:   Try to move around less.   Eat less than usual or drink only liquids until the next morning   Nausea should resolve in about 24 hours    If you have a problem when you are at home:    Call your surgeons office   Discharge Instructions: After Your Surgery  You’ve just had surgery. During surgery, you were given medicine called anesthesia to keep you relaxed and free of pain. After surgery, you may have some pain or nausea. This is common. Here are some tips for feeling better and getting well after surgery.   Going home  Your healthcare provider  will show you how to take care of yourself when you go home. They'll also answer your questions. Have an adult family member or friend drive you home. For the first 24 hours after your surgery:   Don't drive or use heavy equipment.  Don't make important decisions or sign legal papers.  Take medicines as directed.  Don't drink alcohol.  Have someone stay with you, if needed. They can watch for problems and help keep you safe.  Be sure to go to all follow-up visits with your healthcare provider. And rest after your surgery for as long as your provider tells you to.   Coping with pain  If you have pain after surgery, pain medicine will help you feel better. Take it as directed, before pain becomes severe. Also, ask your healthcare provider or pharmacist about other ways to control pain. This might be with heat, ice, or relaxation. And follow any other instructions your surgeon or nurse gives you.      Stay on schedule with your medicine.     Tips for taking pain medicine  To get the best relief possible, remember these points:   Pain medicines can upset your stomach. Taking them with a little food may help.  Most pain relievers taken by mouth need at least 20 to 30 minutes to start to work.  Don't wait till your pain becomes severe before you take your medicine. Try to time your medicine so that you can take it before starting an activity. This might be before you get dressed, go for a walk, or sit down for dinner.  Constipation is a common side effect of some pain medicines. Call your healthcare provider before taking any medicines such as laxatives or stool softeners to help ease constipation. Also ask if you should skip any foods. Drinking lots of fluids and eating foods such as fruits and vegetables that are high in fiber can also help. Remember, don't take laxatives unless your surgeon has prescribed them.  Drinking alcohol and taking pain medicine can cause dizziness and slow your breathing. It can even be  deadly. Don't drink alcohol while taking pain medicine.  Pain medicine can make you react more slowly to things. Don't drive or run machinery while taking pain medicine.  Your healthcare provider may tell you to take acetaminophen to help ease your pain. Ask them how much you're supposed to take each day. Acetaminophen or other pain relievers may interact with your prescription medicines or other over-the-counter (OTC) medicines. Some prescription medicines have acetaminophen and other ingredients in them. Using both prescription and OTC acetaminophen for pain can cause you to accidentally overdose. Read the labels on your OTC medicines with care. This will help you to clearly know the list of ingredients, how much to take, and any warnings. It may also help you not take too much acetaminophen. If you have questions or don't understand the information, ask your pharmacist or healthcare provider to explain it to you before you take the OTC medicine.   Managing nausea  Some people have an upset stomach (nausea) after surgery. This is often because of anesthesia, pain, or pain medicine, less movement of food in the stomach, or the stress of surgery. These tips will help you handle nausea and eat healthy foods as you get better. If you were on a special food plan before surgery, ask your healthcare provider if you should follow it while you get better. Check with your provider on how your eating should progress. It may depend on the surgery you had. These general tips may help:   Don't push yourself to eat. Your body will tell you when to eat and how much.  Start off with clear liquids and soup. They're easier to digest.  Next try semi-solid foods as you feel ready. These include mashed potatoes, applesauce, and gelatin.  Slowly move to solid foods. Don’t eat fatty, rich, or spicy foods at first.  Don't force yourself to have 3 large meals a day. Instead eat smaller amounts more often.  Take pain medicines with a small  amount of solid food, such as crackers or toast. This helps prevent nausea.  When to call your healthcare provider  Call your healthcare provider right away if you have any of these:   You still have too much pain, or the pain gets worse, after taking the medicine. The medicine may not be strong enough. Or there may be a complication from the surgery.  You feel too sleepy, dizzy, or groggy. The medicine may be too strong.  Side effects such as nausea or vomiting. Your healthcare provider may advise taking other medicines to .  Skin changes such as rash, itching, or hives. This may mean you have an allergic reaction. Your provider may advise taking other medicines.  The incision looks different (for instance, part of it opens up).  Bleeding or fluid leaking from the incision site, and weren't told to expect that.  Fever of 100.4°F (38°C) or higher, or as directed by your provider.  Call 911  Call 911 right away if you have:   Trouble breathing  Facial swelling    If you have obstructive sleep apnea   You were given anesthesia medicine during surgery to keep you comfortable and free of pain. After surgery, you may have more apnea spells because of this medicine and other medicines you were given. The spells may last longer than normal.    At home:  Keep using the continuous positive airway pressure (CPAP) device when you sleep. Unless your healthcare provider tells you not to, use it when you sleep, day or night. CPAP is a common device used to treat obstructive sleep apnea.  Talk with your provider before taking any pain medicine, muscle relaxants, or sedatives. Your provider will tell you about the possible dangers of taking these medicines.  Contact your provider if your sleeping changes a lot even when taking medicines as directed.  Nathan last reviewed this educational content on 10/1/2021  © 9099-5012 [employer]. All rights reserved. This information is not intended as a substitute for  professional medical care. Always follow your healthcare professional's instructions.

## 2025-03-21 ENCOUNTER — HOSPITAL ENCOUNTER (OUTPATIENT)
Facility: HOSPITAL | Age: 40
Setting detail: HOSPITAL OUTPATIENT SURGERY
Discharge: HOME | End: 2025-03-21
Attending: OBSTETRICS & GYNECOLOGY | Admitting: OBSTETRICS & GYNECOLOGY
Payer: COMMERCIAL

## 2025-03-21 ENCOUNTER — ANESTHESIA EVENT (OUTPATIENT)
Dept: SURGERY | Facility: HOSPITAL | Age: 40
End: 2025-03-21
Payer: COMMERCIAL

## 2025-03-21 ENCOUNTER — ANESTHESIA (OUTPATIENT)
Dept: SURGERY | Facility: HOSPITAL | Age: 40
End: 2025-03-21
Payer: COMMERCIAL

## 2025-03-21 VITALS
WEIGHT: 163 LBS | HEIGHT: 64 IN | DIASTOLIC BLOOD PRESSURE: 83 MMHG | BODY MASS INDEX: 27.83 KG/M2 | OXYGEN SATURATION: 100 % | RESPIRATION RATE: 16 BRPM | TEMPERATURE: 98 F | HEART RATE: 66 BPM | SYSTOLIC BLOOD PRESSURE: 132 MMHG

## 2025-03-21 DIAGNOSIS — G89.18 POST-OP PAIN: ICD-10-CM

## 2025-03-21 DIAGNOSIS — Z01.818 PRE-OP TESTING: Primary | ICD-10-CM

## 2025-03-21 LAB — B-HCG UR QL: NEGATIVE

## 2025-03-21 PROCEDURE — 88300 SURGICAL PATH GROSS: CPT | Performed by: OBSTETRICS & GYNECOLOGY

## 2025-03-21 PROCEDURE — 88331 PATH CONSLTJ SURG 1 BLK 1SPC: CPT | Performed by: OBSTETRICS & GYNECOLOGY

## 2025-03-21 PROCEDURE — 88112 CYTOPATH CELL ENHANCE TECH: CPT | Performed by: OBSTETRICS & GYNECOLOGY

## 2025-03-21 PROCEDURE — 88307 TISSUE EXAM BY PATHOLOGIST: CPT | Performed by: OBSTETRICS & GYNECOLOGY

## 2025-03-21 PROCEDURE — 81025 URINE PREGNANCY TEST: CPT

## 2025-03-21 PROCEDURE — 0UT64ZZ RESECTION OF LEFT FALLOPIAN TUBE, PERCUTANEOUS ENDOSCOPIC APPROACH: ICD-10-PCS | Performed by: OBSTETRICS & GYNECOLOGY

## 2025-03-21 PROCEDURE — 88305 TISSUE EXAM BY PATHOLOGIST: CPT | Performed by: OBSTETRICS & GYNECOLOGY

## 2025-03-21 PROCEDURE — 88309 TISSUE EXAM BY PATHOLOGIST: CPT | Performed by: OBSTETRICS & GYNECOLOGY

## 2025-03-21 PROCEDURE — 8E0W4CZ ROBOTIC ASSISTED PROCEDURE OF TRUNK REGION, PERCUTANEOUS ENDOSCOPIC APPROACH: ICD-10-PCS | Performed by: OBSTETRICS & GYNECOLOGY

## 2025-03-21 PROCEDURE — 0UT14ZZ RESECTION OF LEFT OVARY, PERCUTANEOUS ENDOSCOPIC APPROACH: ICD-10-PCS | Performed by: OBSTETRICS & GYNECOLOGY

## 2025-03-21 PROCEDURE — 88341 IMHCHEM/IMCYTCHM EA ADD ANTB: CPT | Performed by: OBSTETRICS & GYNECOLOGY

## 2025-03-21 PROCEDURE — 88342 IMHCHEM/IMCYTCHM 1ST ANTB: CPT | Performed by: OBSTETRICS & GYNECOLOGY

## 2025-03-21 PROCEDURE — 0UDB7ZX EXTRACTION OF ENDOMETRIUM, VIA NATURAL OR ARTIFICIAL OPENING, DIAGNOSTIC: ICD-10-PCS | Performed by: OBSTETRICS & GYNECOLOGY

## 2025-03-21 PROCEDURE — 0UPD7HZ REMOVAL OF CONTRACEPTIVE DEVICE FROM UTERUS AND CERVIX, VIA NATURAL OR ARTIFICIAL OPENING: ICD-10-PCS | Performed by: OBSTETRICS & GYNECOLOGY

## 2025-03-21 RX ORDER — HYDROCODONE BITARTRATE AND ACETAMINOPHEN 5; 325 MG/1; MG/1
1 TABLET ORAL EVERY 6 HOURS PRN
Qty: 20 TABLET | Refills: 0 | Status: SHIPPED | OUTPATIENT
Start: 2025-03-21

## 2025-03-21 RX ORDER — PROCHLORPERAZINE MALEATE 10 MG
10 TABLET ORAL EVERY 12 HOURS PRN
Status: CANCELLED | OUTPATIENT
Start: 2025-03-21

## 2025-03-21 RX ORDER — MORPHINE SULFATE 10 MG/ML
6 INJECTION, SOLUTION INTRAMUSCULAR; INTRAVENOUS EVERY 10 MIN PRN
Status: DISCONTINUED | OUTPATIENT
Start: 2025-03-21 | End: 2025-03-21

## 2025-03-21 RX ORDER — MORPHINE SULFATE 4 MG/ML
4 INJECTION, SOLUTION INTRAMUSCULAR; INTRAVENOUS EVERY 10 MIN PRN
Status: DISCONTINUED | OUTPATIENT
Start: 2025-03-21 | End: 2025-03-21

## 2025-03-21 RX ORDER — MORPHINE SULFATE 4 MG/ML
2 INJECTION, SOLUTION INTRAMUSCULAR; INTRAVENOUS EVERY 10 MIN PRN
Status: DISCONTINUED | OUTPATIENT
Start: 2025-03-21 | End: 2025-03-21

## 2025-03-21 RX ORDER — SODIUM CHLORIDE, SODIUM LACTATE, POTASSIUM CHLORIDE, CALCIUM CHLORIDE 600; 310; 30; 20 MG/100ML; MG/100ML; MG/100ML; MG/100ML
INJECTION, SOLUTION INTRAVENOUS CONTINUOUS
Status: DISCONTINUED | OUTPATIENT
Start: 2025-03-21 | End: 2025-03-21

## 2025-03-21 RX ORDER — MIDAZOLAM HYDROCHLORIDE 1 MG/ML
INJECTION INTRAMUSCULAR; INTRAVENOUS AS NEEDED
Status: DISCONTINUED | OUTPATIENT
Start: 2025-03-21 | End: 2025-03-21 | Stop reason: SURG

## 2025-03-21 RX ORDER — ACETAMINOPHEN 325 MG/1
650 TABLET ORAL EVERY 6 HOURS PRN
Status: CANCELLED | OUTPATIENT
Start: 2025-03-21

## 2025-03-21 RX ORDER — HYDROMORPHONE HYDROCHLORIDE 1 MG/ML
0.6 INJECTION, SOLUTION INTRAMUSCULAR; INTRAVENOUS; SUBCUTANEOUS EVERY 5 MIN PRN
Status: DISCONTINUED | OUTPATIENT
Start: 2025-03-21 | End: 2025-03-21

## 2025-03-21 RX ORDER — HYDROMORPHONE HYDROCHLORIDE 1 MG/ML
0.2 INJECTION, SOLUTION INTRAMUSCULAR; INTRAVENOUS; SUBCUTANEOUS EVERY 5 MIN PRN
Status: DISCONTINUED | OUTPATIENT
Start: 2025-03-21 | End: 2025-03-21

## 2025-03-21 RX ORDER — ONDANSETRON 2 MG/ML
4 INJECTION INTRAMUSCULAR; INTRAVENOUS EVERY 6 HOURS PRN
Status: DISCONTINUED | OUTPATIENT
Start: 2025-03-21 | End: 2025-03-21

## 2025-03-21 RX ORDER — ONDANSETRON 2 MG/ML
4 INJECTION INTRAMUSCULAR; INTRAVENOUS EVERY 8 HOURS PRN
Status: CANCELLED | OUTPATIENT
Start: 2025-03-21

## 2025-03-21 RX ORDER — KETOROLAC TROMETHAMINE 30 MG/ML
INJECTION, SOLUTION INTRAMUSCULAR; INTRAVENOUS AS NEEDED
Status: DISCONTINUED | OUTPATIENT
Start: 2025-03-21 | End: 2025-03-21 | Stop reason: SURG

## 2025-03-21 RX ORDER — DOCUSATE SODIUM 100 MG/1
100 CAPSULE, LIQUID FILLED ORAL 2 TIMES DAILY PRN
Qty: 60 CAPSULE | Refills: 0 | Status: SHIPPED | OUTPATIENT
Start: 2025-03-21

## 2025-03-21 RX ORDER — HYDROMORPHONE HYDROCHLORIDE 1 MG/ML
0.4 INJECTION, SOLUTION INTRAMUSCULAR; INTRAVENOUS; SUBCUTANEOUS EVERY 5 MIN PRN
Status: DISCONTINUED | OUTPATIENT
Start: 2025-03-21 | End: 2025-03-21

## 2025-03-21 RX ORDER — NALOXONE HYDROCHLORIDE 0.4 MG/ML
0.08 INJECTION, SOLUTION INTRAMUSCULAR; INTRAVENOUS; SUBCUTANEOUS AS NEEDED
Status: DISCONTINUED | OUTPATIENT
Start: 2025-03-21 | End: 2025-03-21

## 2025-03-21 RX ORDER — ONDANSETRON 2 MG/ML
INJECTION INTRAMUSCULAR; INTRAVENOUS AS NEEDED
Status: DISCONTINUED | OUTPATIENT
Start: 2025-03-21 | End: 2025-03-21 | Stop reason: SURG

## 2025-03-21 RX ORDER — HYDROCODONE BITARTRATE AND ACETAMINOPHEN 5; 325 MG/1; MG/1
2 TABLET ORAL EVERY 6 HOURS PRN
Status: CANCELLED | OUTPATIENT
Start: 2025-03-21

## 2025-03-21 RX ORDER — ROCURONIUM BROMIDE 10 MG/ML
INJECTION, SOLUTION INTRAVENOUS AS NEEDED
Status: DISCONTINUED | OUTPATIENT
Start: 2025-03-21 | End: 2025-03-21 | Stop reason: SURG

## 2025-03-21 RX ORDER — IBUPROFEN 600 MG/1
600 TABLET, FILM COATED ORAL EVERY 8 HOURS PRN
Qty: 60 TABLET | Refills: 0 | Status: SHIPPED | OUTPATIENT
Start: 2025-03-21

## 2025-03-21 RX ORDER — METRONIDAZOLE 500 MG/100ML
500 INJECTION, SOLUTION INTRAVENOUS ONCE
Status: COMPLETED | OUTPATIENT
Start: 2025-03-21 | End: 2025-03-21

## 2025-03-21 RX ORDER — ACETAMINOPHEN 500 MG
1000 TABLET ORAL ONCE
Status: COMPLETED | OUTPATIENT
Start: 2025-03-21 | End: 2025-03-21

## 2025-03-21 RX ORDER — PROCHLORPERAZINE 25 MG
25 SUPPOSITORY, RECTAL RECTAL EVERY 12 HOURS PRN
Status: CANCELLED | OUTPATIENT
Start: 2025-03-21

## 2025-03-21 RX ORDER — ACETAMINOPHEN 10 MG/ML
1000 INJECTION, SOLUTION INTRAVENOUS ONCE
Status: DISCONTINUED | OUTPATIENT
Start: 2025-03-21 | End: 2025-03-21

## 2025-03-21 RX ORDER — LIDOCAINE HYDROCHLORIDE 10 MG/ML
INJECTION, SOLUTION EPIDURAL; INFILTRATION; INTRACAUDAL; PERINEURAL AS NEEDED
Status: DISCONTINUED | OUTPATIENT
Start: 2025-03-21 | End: 2025-03-21 | Stop reason: SURG

## 2025-03-21 RX ORDER — PHENYLEPHRINE HCL 10 MG/ML
VIAL (ML) INJECTION AS NEEDED
Status: DISCONTINUED | OUTPATIENT
Start: 2025-03-21 | End: 2025-03-21 | Stop reason: SURG

## 2025-03-21 RX ORDER — ONDANSETRON 4 MG/1
4 TABLET, FILM COATED ORAL EVERY 8 HOURS PRN
Status: CANCELLED | OUTPATIENT
Start: 2025-03-21

## 2025-03-21 RX ORDER — PROCHLORPERAZINE EDISYLATE 5 MG/ML
5 INJECTION INTRAMUSCULAR; INTRAVENOUS EVERY 8 HOURS PRN
Status: DISCONTINUED | OUTPATIENT
Start: 2025-03-21 | End: 2025-03-21

## 2025-03-21 RX ORDER — HYDROCODONE BITARTRATE AND ACETAMINOPHEN 5; 325 MG/1; MG/1
1 TABLET ORAL EVERY 6 HOURS PRN
Status: CANCELLED | OUTPATIENT
Start: 2025-03-21

## 2025-03-21 RX ORDER — DEXAMETHASONE SODIUM PHOSPHATE 4 MG/ML
VIAL (ML) INJECTION AS NEEDED
Status: DISCONTINUED | OUTPATIENT
Start: 2025-03-21 | End: 2025-03-21 | Stop reason: SURG

## 2025-03-21 RX ORDER — LABETALOL HYDROCHLORIDE 5 MG/ML
5 INJECTION, SOLUTION INTRAVENOUS EVERY 5 MIN PRN
Status: DISCONTINUED | OUTPATIENT
Start: 2025-03-21 | End: 2025-03-21

## 2025-03-21 RX ORDER — BUPIVACAINE HYDROCHLORIDE 5 MG/ML
INJECTION, SOLUTION EPIDURAL; INTRACAUDAL; PERINEURAL AS NEEDED
Status: DISCONTINUED | OUTPATIENT
Start: 2025-03-21 | End: 2025-03-21 | Stop reason: HOSPADM

## 2025-03-21 RX ADMIN — ROCURONIUM BROMIDE 20 MG: 10 INJECTION, SOLUTION INTRAVENOUS at 08:44:00

## 2025-03-21 RX ADMIN — DEXAMETHASONE SODIUM PHOSPHATE 8 MG: 4 MG/ML VIAL (ML) INJECTION at 07:51:00

## 2025-03-21 RX ADMIN — PHENYLEPHRINE HCL 100 MCG: 10 MG/ML VIAL (ML) INJECTION at 09:22:00

## 2025-03-21 RX ADMIN — LIDOCAINE HYDROCHLORIDE 50 MG: 10 INJECTION, SOLUTION EPIDURAL; INFILTRATION; INTRACAUDAL; PERINEURAL at 07:42:00

## 2025-03-21 RX ADMIN — SODIUM CHLORIDE, SODIUM LACTATE, POTASSIUM CHLORIDE, CALCIUM CHLORIDE: 600; 310; 30; 20 INJECTION, SOLUTION INTRAVENOUS at 09:25:00

## 2025-03-21 RX ADMIN — ONDANSETRON 4 MG: 2 INJECTION INTRAMUSCULAR; INTRAVENOUS at 07:51:00

## 2025-03-21 RX ADMIN — ROCURONIUM BROMIDE 50 MG: 10 INJECTION, SOLUTION INTRAVENOUS at 07:42:00

## 2025-03-21 RX ADMIN — METRONIDAZOLE 500 MG: 500 INJECTION, SOLUTION INTRAVENOUS at 07:59:00

## 2025-03-21 RX ADMIN — KETOROLAC TROMETHAMINE 30 MG: 30 INJECTION, SOLUTION INTRAMUSCULAR; INTRAVENOUS at 09:32:00

## 2025-03-21 RX ADMIN — MIDAZOLAM HYDROCHLORIDE 2 MG: 1 INJECTION INTRAMUSCULAR; INTRAVENOUS at 07:34:00

## 2025-03-21 RX ADMIN — SODIUM CHLORIDE, SODIUM LACTATE, POTASSIUM CHLORIDE, CALCIUM CHLORIDE: 600; 310; 30; 20 INJECTION, SOLUTION INTRAVENOUS at 07:34:00

## 2025-03-21 NOTE — ANESTHESIA PREPROCEDURE EVALUATION
Anesthesia PreOp Note    HPI:     Enma Pastrana is a 40 year old female who presents for preoperative consultation requested by: Humberto Houser DO    Date of Surgery: 3/21/2025    Procedure(s):  Xi robotic assisted laparoscopic bilateral salpingo-oophorectomy, possible staging, possible lymph node dissection, possible bilateral ureterolysis, possible exploratory laparotomy  Indication: Left ovarian cyst, right ovarian cancer    Relevant Problems   No relevant active problems       NPO:  Last Liquid Consumption Date: 25  Last Liquid Consumption Time: 230  Last Solid Consumption Date: 25  Last Solid Consumption Time: 2300  Last Liquid Consumption Date: 25          History Review:  Patient Active Problem List    Diagnosis Date Noted    Routine health maintenance 2023    Encounter for insertion of intrauterine contraceptive device (IUD) 2022    Primary hypertension 2021    Ovarian tumor of borderline malignancy, right 2017    Microcytic anemia 10/25/2016    Vitamin D deficiency 10/25/2016       Past Medical History:    Allergic rhinitis    Anemia    Anesthesia complication    difficult waking up    Anxiety state    Double cervix    right cervix is blind pouch, left cervix is patent, no bicornuate uterus    Essential hypertension    Exposure to TB    High blood pressure    History of abnormal cervical Pap smear    at age 21    History of chicken pox    in childhood     Infertility, female    evaluation with R cervix blind pouch & L cervix patent on HSG (NOT bicornuate per care everywhere)    Migraines    Seasonal allergies    Visual impairment    [facility] [place] teeth extracted       Past Surgical History:   Procedure Laterality Date      8/10/2020    D & c      Hysteroscopy      Other surgical history  2017    laparoscopy with right salpingo-oophorectomy for cystadenoma    Other surgical history  3/19 &     partial septum removed x 2    [facility] [place] teeth removed          Prescriptions Prior to Admission[1]  Current Medications and Prescriptions Ordered in Epic[2]    Allergies[3]    Family History   Problem Relation Age of Onset    Breast Cancer Mother 65    High Blood Pressure Father     Hypertension Father     Other (Other) Maternal Grandmother         osteoporosis    Stroke Maternal Grandfather     High Blood Pressure Paternal Grandmother     Stroke Paternal Grandmother     Prostate Cancer Paternal Grandfather 80        early 80s    Diabetes Paternal Aunt     High Blood Pressure Paternal Aunt     High Cholesterol Paternal Aunt      Social History     Socioeconomic History    Marital status:    Tobacco Use    Smoking status: Never    Smokeless tobacco: Never   Vaping Use    Vaping status: Never Used   Substance and Sexual Activity    Alcohol use: Not Currently    Drug use: No    Sexual activity: Not Currently     Partners: Male   Other Topics Concern    Caffeine Concern Yes     Comment: 1-2 cups of coffee a day     Exercise Yes     Comment: walking     Seat Belt Yes       Available pre-op labs reviewed.  Lab Results   Component Value Date    WBC 6.2 02/19/2025    RBC 4.63 02/19/2025    HGB 12.4 02/19/2025    HCT 39.5 02/19/2025    MCV 85.3 02/19/2025    MCH 26.8 02/19/2025    MCHC 31.4 02/19/2025    RDW 12.8 02/19/2025    .0 02/19/2025    URINEPREG Negative 03/21/2025     Lab Results   Component Value Date     02/19/2025    K 4.2 02/19/2025     02/19/2025    CO2 27.0 02/19/2025    BUN 17 02/19/2025    CREATSERUM 0.85 02/19/2025    GLU 90 02/19/2025    CA 9.3 02/19/2025          Vital Signs:  Body mass index is 27.98 kg/m².   height is 1.626 m (5' 4\") and weight is 73.9 kg (163 lb). Her oral temperature is 98.2 °F (36.8 °C). Her blood pressure is 128/89 and her pulse is 83. Her respiration is 20 and oxygen saturation is 98%.   Vitals:    03/18/25 1059 03/21/25 0639   BP:  128/89   Pulse:  83   Resp:  20   Temp:  98.2 °F (36.8 °C)   TempSrc:  Oral    SpO2:  98%   Weight: 72.6 kg (160 lb) 73.9 kg (163 lb)   Height: 1.626 m (5' 4\") 1.626 m (5' 4\")        Anesthesia Evaluation     Patient summary reviewed and Nursing notes reviewed    Airway   Mallampati: II  TM distance: >3 FB  Neck ROM: full  Dental - Dentition appears grossly intact     Pulmonary - normal exam   Cardiovascular - normal exam  (+) hypertension    Neuro/Psych    (+)  anxiety/panic attacks,        GI/Hepatic/Renal      Endo/Other    Abdominal  - normal exam                 Anesthesia Plan:   ASA:  2  Plan:   General  Airway:  ETT  Informed Consent Plan and Risks Discussed With:  Patient      I have informed Enma Pastrana and/or legal guardian or family member of the nature of the anesthetic plan, benefits, risks including possible dental damage if relevant, major complications, and any alternative forms of anesthetic management.   All of the patient's questions were answered to the best of my ability. The patient desires the anesthetic management as planned.  Stu Boone MD  3/21/2025 7:06 AM  Present on Admission:  **None**           [1]   Medications Prior to Admission   Medication Sig Dispense Refill Last Dose/Taking    Cholecalciferol 125 MCG (5000 UT) Oral Tab Take 1 tablet (5,000 Units total) by mouth daily.   Past Week    cyanocobalamin 1000 MCG Oral Tab Take 1 tablet (1,000 mcg total) by mouth daily.   Past Week    losartan 50 MG Oral Tab Take 1 tablet (50 mg total) by mouth daily. 90 tablet 3 3/20/2025 at  7:00 AM   [2]   Current Facility-Administered Medications Ordered in Epic   Medication Dose Route Frequency Provider Last Rate Last Admin    lactated ringers infusion   Intravenous Continuous Corrina, Humberto, DO        ceFAZolin (Ancef) 2g in 10mL IV syringe premix  2 g Intravenous Once Corrina, Humberto, DO         No current [facility]-ordered outpatient medications on file.   [3] No Known Allergies

## 2025-03-21 NOTE — OPERATIVE REPORT
[facility] OPERATING ROOM  Operative Note     Enma Pastrana Location: OR   CSN [id number] MRN [id number]   Admission Date 3/21/2025 Operation Date 3/21/2025   Attending Physician Humberto Houser DO Operating Physician HUMBERTO HOUSER DO      Preoperative Diagnosis: Left ovarian cyst, right ovarian cancer, history of right borderline ovarian tumor, endometrial polyp     Postoperative Diagnosis: Left ovarian cyst, right ovarian cancer, same, left borderline ovarian tumor     Procedure Performed:   Xi robotic assisted laparoscopic left salpingo-oophorectomy, dilation and curettage, omentectomy, removal of IUD, resection of left sigmoid mesenteric lesion measuring 1.5 x 1 cm    Primary Surgeon: HUMBERTO HOUSER DO      Assistant: Janie Sapp     Surgical Findings: Patient has a 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  She now presents with a persistent left ovarian cyst with nodularity and per the patient's request wanted to keep her uterus.  She does have a history of 2 cervix's.  Her preoperative ultrasound did show an endometrial polyp and therefore I remove the IUD and did perform a D&C and removed what I believed to be was the polyp.     Intraperitoneal, the patient has a left ovarian cyst that is complex in nature with a ovarian surface nodule measuring approximately 1.3 x 1 cm that is most consistent with a recurrent borderline tumor.  The cyst was removed completely intact within an anchor bag with absolutely 0 spillage.  Her omentum appeared to have multiple excrescences on it which may be from her previous surgery and scar tissue versus small borderline implants.  I did perform a total omentectomy and sent to the pathology.  Her appendix was completely within normal limits.  In addition she had 2 sigmoid mesenteric lesions that appear to be most consistent with scar tissue versus endometriosis but cannot rule out borderline implant and  therefore I resected it in its entirety.  This lesion measured approximately 1.5 x 1 cm.     Anesthesia: General     Complications: None     Implants: * No implants in log *     Specimen: Left tube and ovary, pelvic washings, sigmoid mesenteric lesions, omentectomy     Drains: Stable     Condition: Stable     Estimated Blood Loss: Blood Output: 10 mL (3/21/2025  9:25 AM)    Urine Output:: 500cc    Fluids: 1 L     Summary of Case:  Patient was taken back to the operating room and placed in a lithotomy position.  She then prepared and draped in the normal sterile fashion in dorsal lithotomy position.  Next a Mitchell catheter was placed under sterile conditions.  Next a weighted speculum was then placed in the vagina cervix was identified grasped and her IUD was removed.  It was then grasped with a single-tooth tenaculum then sounded to approximately 10 cm dilated up to 7 mm and performed an endometrial curettage removing multiple endometrial tissue possible endometrial polyps.  The V-Care was then used and applied as directed.         Next attention was then turned to the abdomen where Stevens's point was identified.  It was confirmed that the patient had an OG tube already placed.  At this time Stevens's point was then injected with Marcaine transected 5 mm and the Veress needle was then used to enter into the peritoneal cavity.  It was then tested and felt to be intraperitoneal and the abdomen was then insufflated with CO2 gas.  At this time a 5 mm air seal was then used as a Visiport with a 5 mm laparoscope and entered into the peritoneal cavity under direct visualization.  The remaining 4 ports were all placed under direct visualization 2 on each side of midline each approximately 8 mm in size.  The robot was then docked and I use a fenestrated bipolar the monopolar scissors and the pro-grasp in my robotic arms.  At this time I went to the console and began by getting pelvic washings.  I began by making a defect in  the peritoneum lateral to left her IP ligament and open up the pararectal space.  The left ureter was identified and felt to be complete out of harm's way.  A defect was created in the medial leaf of the broad ligament between the IP ligament and the ureter.  The left IP ligament is then cauterized in multiple areas and transected with more yan in the body.  The left utero-ovarian ligament was then cauterized in multiple areas and transected with more yan in the body.  There was no evidence of disease on the uterus anteriorly or posteriorly so the decision was made to keep her uterus per the patient's request.     I then began performing a total omentectomy from the splenic flexure all the way to the hepatic flexure making sure I did not use any energy source on or near the bowel.  This was done using the LigaSure.  The omentum did have multiple excrescences that may very well be previous surgical scar tissue but also may be small borderline implants.  Nevertheless the omentum was then placed in the paracolic gutter.     I then identified multiple white plaques on the left lateral aspect of the mesentery of the sigmoid colon.  It appeared to be superficial.  I did use the monopolar cautery and transected these lesions and lightly cauterized the base for excellent hemostasis.  I then lowered the pressure to 7 for well over 5 minutes and felt that hemostasis was assured from the omentum and the left adnexa.      I scrubbed back in and undocked the robot.  All the instruments were removed.  The midline port was removed and the midline incision was extended approximately 15 mm.  I began by placing the grasper into the pelvis and began removing the omentum without any difficulty.  I then placed an anchor bag through the midline port and placed the left adnexa into the anchor bag completely intact and pulled the anchor back out through the incision with absolutely no spillage or cross-contamination.      At this time I  closed the fascia using an 0 Vicryl in a running fashion in a full-thickness closure.  The skin was then closed using a 4-0 Monocryl in a subcuticular fashion.  Dermabond was then used on the skin.  This end dictation I was present scrubbed and are performed the entire procedure from skin to skin.         LILIBETH MCCOY DO  3/21/2025  9:25 AM

## 2025-03-21 NOTE — H&P
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 Patient was last seen in 2024, at which time a complex cyst was noted on US 2024.     Patient initially presented to the ED in [place], [place] in May 2017 after acute onset of pelvic pain. An abdominal/pelvic CT and US were performed, confirming a 7-8 cm fluid collection in the right adnexa without definitive mass. She had repeat US later that week with Dr. Cotter which showed persistent complex mass measuring 6.7 cm in greatest diameter. She proceeded with conservative management. Follow-up US in 2017 showed persistent mass with multiple cystic lesions with new complex fluid in the pelvis. Therefore, patient underwent laparoscopy with RSO on 2017, final pathology revealed \"two foci of serous borderline tumor/atypical proliferating serous tumor present on surface of ovary and characterized by areas of moderate cytologic atypia and scattered aggregates of psammomatous calcification,\" with noninvasive implants to the right tube and positive pelvic washings. She was referred to gyn onc for further evaluation and treatment. Given her desire for fertility, conservative management was recommended with routine Ca125 and pelvic US. Following last visit in , patient has followed with infertility. Patient did have 3 miscarriages, diagnosed with septum of uterus, but did carry last pregnancy to 26 weeks at which time she delivered emergently, secondary to severe pre-eclampsia and HELLP syndrome. Her daughter is now 4  years old, and overall doing well. She recently had her 3 year well child check, and will hopefully be having her g-tube  removed in near future.     Patient states she is overall doing well at this time from a gynecologic cancer perspective. No active concerns with pelvic pain or abnormal bleeding.   Denies any pain/nausea/vomiting/fever/chills. Bowel movements are regular, no urinary complaints.  Regarding family planning, patient does believe that she has completed her family planning secondary to issues with pregnancy with her daughter. Would like to maintain left ovary until age 44 if possible. Patient has IUD in place, with this, no significant bleeding. Occasional spotting with stress, but beyond this, no bleeding. She is sexually active, no issues with intercourse. She is doing self breast exams. Mammogram completed within the past year, WNL. She is currently working, and otherwise without complaints.    Patient does report today that she is adopted, and recently found out that her birth mother had breast cancer in her 60's. She has since passed away.    PAST CANCER HISTORY:  Serous borderline malignancy of right ovary 2017  PAST MEDICAL HISTORY:  Ovarian cancer, R ovary borderline   Anemia  Ovarian mass  PAST SURGICAL HISTORY:  Laparoscopic RSO 2017  PAST OB-GYN HISTORY:  Menses Details: Age of First Menses: 9; Last Period: 10/28/2021;  Pregnancy Details: : 4; Para: 1; SAB: 3;  Menopause Information: Premenopausal;  OB/GYN Medication Hx: Contraceptive Hormonal Use: Yes; Years of Use: 7; Currently Using: No; Post-Menopausal Hormonal Therapy: No; IUD: No; Other Contraceptive Hx: No;  PAST SOCIAL HISTORY:  (Reviewed - no changes required)  Marital:   Smoking Status: Smoking Tobacco : Never smoker; Smokeless Tobacco : none found; Vaping : none found never smoker  Alcohol Consumption: social  Substance Abuse: None   Work History: physician       PAST FAMILY HISTORY:    Father: Alive and Well; Mother: Alive and Well    CURRENT MEDICATIONS:    Losartan Oral 50 mg tablet  ALLERGIES:  No known medication  allergies    REVIEW OF SYSTEMS:    A 14 point review of systems was performed with any pertinent positives noted in the HPI and otherwise negative.    VITAL SIGNS:    Blood pressure: 110/82, Sitting, L arm, Regular, Pulse: 126, Temperature: 97.3 F, Respirations: , O2 sat: 98%, At Rest, Room Air, Pain Scale: 0, Height: 64 in, Weight: 160.8 lb, BSA: 1.78, BMI: 27.6 kg/m2    PHYSICAL EXAM:    GENERAL: Alert and oriented x's 3.  Well appearing female in NAD.    HEENT: Pupils are equal and reactive without scleral icterus.  Normal cephalic, atraumatic,   PERRLA.  EOM's intact bilaterally.    NECK: Trachea is midline.  Supple, no LAD, no thyromegaly.    BACK:  No CVA or spinous tenderness.  LUNGS: CTAB, no wheezing.  Breathing non-labored.  CARDIAC: RRR, no murmurs.    ABDOMEN: Soft NTND, BS x's 4.  Small incision from laparotomy in RLQ present, healing well  LYMPH: no inguinal LAD  :    External Genitalia: Normal external genitalia.  Hair distribution, no lesion    Urethral Meatus: No lesions    Urethra:  No masses no nodularity    Bladder: Fullness, no masses, no tenderness, no scarring    Vagina: Smooth vaginal mucosa. Normal physiologic discharge present.     Cervix: Two cervix noted, no nodularity, no lesions. IUD strings visualized.     Uterus:  Uterus is normal size, mobile.  Retroverted    Adnexa:  Do not appreciate any pelvic masses or nodularity.     Parametria: No nodularity  RECTAL: Deferred.    EXTREMITIES: No clubbing, cyanosis, or edema bilaterally.    NEUROLOGIC: Cranial nerves are grossly intact bilaterally.    PSYCHIATRIC: Appropriate mood and affect.    ECO    PROCEDURES PERFORMED DURING VISIT    Procedure Name, Description, & Technique: Female Bimanual Pelvic Exam  Instruments/Tools Used: Speculum, Gel  Chaperone Present: yes    LABS/PATHOLOGY:    Surgical pathology 17:  A. Right ovary and tube  - Enlarged ovary (13.6 cm in max dimension) demonstrating two foci of serous borderline  tumor/atypical proliferating serous tumor (6.5 cm and 5.1 cm in max dimensions) present on the surface of the ovary and characterized by areas of moderate cytologic atypia and scattered aggregates of psammomatous calcification.   - Approximated specimen margins of the ovary and fallopian tube show no evidence of malignancy.   - No evidence of tumor LVSI is identified.   - Ovary also demonstrates a hemorrhagic corpus luteal cyst (1.6 cm in max dimension) and scattered cystic follicles.   - Associated overlying fallopian tube demonstrates two small noninvasive tumor implants (largest 0.3 cm in max dimension) with psammomatous calcifications, scattered paratubal cysts (largest 0.4 cm in max dimension) and foci of mild to moderate acute salpingitis.   TNM: pT2a(m)  B. Pelvic washings   - Positive for malignancy, serous tumor cells    9/19/17:          Ca125        15    11/26/17:          Ca125        11    2/23/18:          Ca125                13    4/12/18:          Ca125                11    Pap 2/2024:            NILM/HPV neg      8/16/24:           Ca125                    12      DIAGNOSTIC STUDIES REVIEWED:  CT AP  5/17/17:  1.  Right adnexal enlargement.  Further evaluation with US could be performed.  2.  Small volume of free peritoneal space fluid.  No suspicious loculation.  No free air.  No focal bowel abnormality.    US Pelvis 5/18/17:  1. Complex right adnexal mass which appears to surround the central right ovary with normal follicles and persistent normal blood flow. Findings suggest hemorrhagic right ovarian cyst/right adnexal hemorrhage and this appears to correspond to outside CT diagnosis.   2. Unremarkable uterus, endometrium, and left ovary. Small amount of nonspecific pelvic free fluid.   3. Beta-hCG negative. The possibility of right sided ectopic is excluded. Clinical findings not consistent with tubo-ovarian abscess.   4. F/u US evaluations are recommended to presumed resolution.     US Pelvis  7/11/17:  Persistent right complex ovarian mass with new complex free fluid in the pelvis which may represent proteinaceous material vs blood byproducts. Given persistence, recommend pelvic MRI.     US Pelvis 11/24/17:  1. Interval postsurgical changes of right salpingo-oophorectomy. No suspicious residual right adnexal mass.  2. Left ovary appears increased in size since prior and demonstrates a new possibly slightly exophytic 1.7 x 1.8 x 1.7 cm echogenic nodule. In the setting of known right ovarian neoplasm, suggest pelvic MR for further characterization.  3. Suspected partial septate configuration of the uterus.  4. Small volume free fluid in the pelvis, nonspecific, but most likely physiologic.     MRI Pelvis 12/1/17:  Status post right salpingo-oophorectomy. No complicating process is identified.  Normal left ovarian size with numerous peripherally oriented subcentimeter follicles and a single dominant follicle measuring 1.6 cm. This finding is most likely normal but correlate clinically for any features of PCOS.   Normal uterus with normal endometrial signal, junctional zone and myometrium. 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  2.3 x 1.4 x 1.6 cm pre-sacral/retrorectal cyst above the pelvic floor. The differential diagnosis would include predominantly benign etiologies such as a benign tail gut cyst. See above discussion and reference.    OB US <14 weeks 4/2/2018:  Uterus measures 7.4 x 4.4 x 7.9 cm, and there is redemonstration of a suspected septated uterus with an abnormal appearing gestational sac/fluid collection in the left uterine horn with a separate endometrium on the right. Prominent endomoetrium in the right horn. There is a non-viable appearing featus measuring 6.7 mm giving an age of 6 weeks 4 days without fetal body motion. There has been right oophorectomy. Left ovary measures 4.0 x 2.6 x 3.3 cm with 2 cysts noted. One may be a corpus luteal cyst  measuring 1.9 x 1.4 x 1.3 cm, and the other is more simple measuring 1.5 x 1.2 x 1.5 cm. No free fluid is seen. No free pelvic fluid.   1. There is fetal demise of a nonviable 6 week 4 day fetus without fetal body motion or fetal heart tones located in the left uterine horn of a septated uterus. Prominent endometrial reaction of the right uterine horn. Small subchronic hemorrhage suggested in the left gestational sac. Status post right ooopherectomy. 2 cysts in the left ovary as noted above.    US 6/11/24:  1. Normal uterine sonogram. Retroverted uterus with normal thickness endometrium.  2. IUD in the endometrial canal satisfactory location  3. Left ovary with 2 complex ovarian cystic structures. One of these represents recurrent hemorrhagic cyst with low level echoes. Second complex cystic lesion with intramural nodularity in intrinsic doppler vascularity has remained relatively stable since 12/5/23. Intramural nodule measuring 11x8 mm without significant change recommend continued 6-12 mo follow up surveillance.     US Pelvis 10/30/24:  IMPRESSION:  1. No significant' change in the 2.4 cm mixed cystic and solid left ovarian lesion with the dominant nodule measuring 1.1 cm.  2. A 1.6 cm left ovarian cystic lesion with minimal low-level internal echoes, which previously measured 2.3 cm on the prior ultrasound and likely reflects a hemorrhagic cyst.  3. Surgically absent right ovary.  4. A 5 mm mildly echogenic lesion in the upper cervical canal with Doppler flow. This lesion may reflect a cervical polyp and/or be related to patient's known history of a right cervical pouch.  5. Endometrial thickness of 5 mm with a well-positioned intrauterine device. 6. Lesser incidental findings described above.    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 Previously reviewed pathology with patient and , along with imaging from time of surgery. Discussed that there is no clear cut management plan for this time of tumor particularly in a woman who desires future fertility. Reviewed finding of positive pelvic washings, which is not surprising secondary to exophytic nature of the tumor. As patient desired future fertility, conservative management recommended. Patient has completed family planning at this time, and will plan for removal of left tube and ovary at age 44. Today, reviewed with patient stable findings of US, however, do note a solid nodule present. At this time, recommend she complete pelvic MRI and tumor markers. If suspicious features, then recommend patient proceed with surgical management. Will plan for telehealth visit to review.  Duplicate cervix: Patient had HSG done with DANYELL. 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 Cervical pap smear collected at last visit. Next pap due 2025. No lesions.

## 2025-03-21 NOTE — ANESTHESIA PROCEDURE NOTES
Airway  Date/Time: 3/21/2025 7:44 AM  Urgency: Elective      General Information and Staff    Patient location during procedure: OR  Anesthesiologist: Stu Boone MD  Performed: anesthesiologist   Performed by: Stu Boone MD  Authorized by: Stu Boone MD      Indications and Patient Condition  Indications for airway management: anesthesia  Sedation level: deep  Preoxygenated: yes  Patient position: sniffing  Mask difficulty assessment: 1 - vent by mask    Final Airway Details  Final airway type: endotracheal airway      Successful airway: ETT  Cuffed: yes   Successful intubation technique: direct laryngoscopy  Endotracheal tube insertion site: oral  Blade: Ori  Blade size: #3  ETT size (mm): 7.5    Cormack-Lehane Classification: grade I - full view of glottis  Placement verified by: capnometry   Measured from: teeth  Number of attempts at approach: 1

## 2025-03-21 NOTE — ANESTHESIA PROCEDURE NOTES
Peripheral IV  Date/Time: 3/21/2025 7:49 AM  Inserted by: Stu Boone MD    Placement  Needle size: 20 G  Laterality: left  Location: wrist  Local anesthetic: none  Site prep: alcohol  Technique: anatomical landmarks  Attempts: 1

## 2025-04-02 ENCOUNTER — TELEPHONE (OUTPATIENT)
Dept: OBGYN CLINIC | Facility: CLINIC | Age: 40
End: 2025-04-02

## 2025-04-02 NOTE — TELEPHONE ENCOUNTER
Received Gynecology Oncology office notes from  office DOS:4/2/25.    Placed on Gabe desk for review

## 2025-04-07 ENCOUNTER — MED REC SCAN ONLY (OUTPATIENT)
Dept: OBGYN CLINIC | Facility: CLINIC | Age: 40
End: 2025-04-07

## 2025-04-21 ENCOUNTER — LAB ENCOUNTER (OUTPATIENT)
Dept: LAB | Age: 40
End: 2025-04-21
Attending: INTERNAL MEDICINE
Payer: COMMERCIAL

## 2025-04-21 DIAGNOSIS — R07.81 PLEURITIC CHEST PAIN: ICD-10-CM

## 2025-04-21 DIAGNOSIS — R07.81 PLEURITIC CHEST PAIN: Primary | ICD-10-CM

## 2025-04-21 LAB — D DIMER PPP FEU-MCNC: 0.33 UG/ML FEU (ref ?–0.5)

## 2025-04-21 PROCEDURE — 36415 COLL VENOUS BLD VENIPUNCTURE: CPT

## 2025-04-21 PROCEDURE — 85379 FIBRIN DEGRADATION QUANT: CPT

## 2025-05-19 RX ORDER — LOSARTAN POTASSIUM 25 MG/1
25 TABLET ORAL DAILY
Qty: 90 TABLET | Refills: 3 | OUTPATIENT
Start: 2025-05-19

## 2025-05-19 NOTE — TELEPHONE ENCOUNTER
Current refill request refused due to refill is either a duplicate request or has active refills at the pharmacy.  Check previous templates.    Requested Prescriptions     Refused Prescriptions Disp Refills    LOSARTAN 25 MG Oral Tab [Pharmacy Med Name: LOSARTAN 25MG TABLETS] 90 tablet 3     Sig: TAKE 1 TABLET(25 MG) BY MOUTH DAILY     Refused By: VINNY LANGLEY     Reason for Refusal: Duplicate refill request

## 2025-05-22 ENCOUNTER — TELEPHONE (OUTPATIENT)
Dept: OBGYN CLINIC | Facility: CLINIC | Age: 40
End: 2025-05-22

## 2025-05-22 NOTE — TELEPHONE ENCOUNTER
Patient is wanting to follow up with Dr. Gamez after seeing Dr. Houser , no available openings soon. Please advise

## 2025-06-04 ENCOUNTER — APPOINTMENT (OUTPATIENT)
Dept: LAB | Age: 40
End: 2025-06-04

## 2025-07-03 ENCOUNTER — OFFICE VISIT (OUTPATIENT)
Dept: OBGYN CLINIC | Facility: CLINIC | Age: 40
End: 2025-07-03
Payer: COMMERCIAL

## 2025-07-03 VITALS
DIASTOLIC BLOOD PRESSURE: 85 MMHG | WEIGHT: 158.63 LBS | HEART RATE: 69 BPM | SYSTOLIC BLOOD PRESSURE: 121 MMHG | BODY MASS INDEX: 27 KG/M2

## 2025-07-03 DIAGNOSIS — Z12.31 SCREENING MAMMOGRAM FOR BREAST CANCER: ICD-10-CM

## 2025-07-03 DIAGNOSIS — Z01.419 ENCOUNTER FOR GYNECOLOGICAL EXAMINATION WITHOUT ABNORMAL FINDING: Primary | ICD-10-CM

## 2025-07-03 DIAGNOSIS — Z80.3 FAMILY HISTORY OF BREAST CANCER IN MOTHER: ICD-10-CM

## 2025-07-03 DIAGNOSIS — Z12.4 SCREENING FOR CERVICAL CANCER: ICD-10-CM

## 2025-07-03 NOTE — PROGRESS NOTES
Subjective:   Patient ID: Enma Pastrana is a 40 year old female.    HPI  History of Present Illness  Enma Pastrana is a 40 year old female who presents for a routine exam and evaluation of hormonal imbalance symptoms.    She experiences fatigue and low energy levels, feeling that her hormones are not back to normal. She is not currently using hormone replacement therapy and is unsure of her hormone levels. She started feeling better after beginning the Combipatch following a two-week period without hormones, but still lacks her previous energy levels.    She has occasional hot flashes, occurring once or twice a week, even in cool environments. Additionally, she wakes up with morning stiffness, a symptom that was not present in February.    Her past surgical history includes the removal of an ovary on March 23rd, after which she was without hormones for two weeks before starting the Combipatch. She feels better on the Combipatch compared to the period without hormones. She had a Kyleena IUD removed during surgery.   Her family history is limited due to being adopted, but she is aware that her birth mother had breast cancer at the age of 65.      History/Other:   Review of Systems   Constitutional:  Negative for appetite change, fatigue and unexpected weight change.   Eyes:  Negative for visual disturbance.   Respiratory:  Negative for cough and shortness of breath.    Cardiovascular:  Negative for chest pain, palpitations and leg swelling.   Gastrointestinal:  Negative for abdominal distention, abdominal pain, blood in stool, constipation and diarrhea.   Genitourinary:  Negative for dyspareunia, dysuria, frequency, pelvic pain and urgency.   Musculoskeletal:  Negative for arthralgias and myalgias.   Skin:  Negative for rash.   Neurological:  Negative for weakness, numbness and headaches.   Psychiatric/Behavioral:  Negative for dysphoric mood. The patient is not nervous/anxious.      Current Outpatient  Medications   Medication Sig Dispense Refill    COMBIPATCH 0.05-0.14 MG/DAY Transdermal Patch Biweekly Place 1 patch onto the skin.      losartan 50 MG Oral Tab Take 1 tablet (50 mg total) by mouth daily. 90 tablet 3    docusate sodium 100 MG Oral Cap Take 1 capsule (100 mg total) by mouth 2 (two) times daily as needed for constipation. 60 capsule 0    HYDROcodone-acetaminophen 5-325 MG Oral Tab Take 1 tablet by mouth every 6 (six) hours as needed for Pain. 20 tablet 0    ibuprofen 600 MG Oral Tab Take 1 tablet (600 mg total) by mouth every 8 (eight) hours as needed for Pain. 60 tablet 0    Cholecalciferol 125 MCG (5000 UT) Oral Tab Take 1 tablet (5,000 Units total) by mouth daily. (Patient not taking: Reported on 7/3/2025)      cyanocobalamin 1000 MCG Oral Tab Take 1 tablet (1,000 mcg total) by mouth daily. (Patient not taking: Reported on 7/3/2025)       Allergies:No Known Allergies    Objective:   Physical Exam  Constitutional:       General: She is not in acute distress.     Appearance: She is well-developed. She is not diaphoretic.   Neck:      Thyroid: No thyromegaly.   Cardiovascular:      Rate and Rhythm: Normal rate and regular rhythm.      Heart sounds: Normal heart sounds. No murmur heard.  Pulmonary:      Effort: Pulmonary effort is normal.      Breath sounds: Normal breath sounds. No wheezing or rales.   Chest:   Breasts:     Right: Normal. No mass, nipple discharge, skin change or tenderness.      Left: Normal. No mass, nipple discharge, skin change or tenderness.      Comments:     Abdominal:      General: There is no distension.      Palpations: Abdomen is soft. There is no mass.      Tenderness: There is no abdominal tenderness. There is no guarding or rebound.   Genitourinary:     Labia:         Right: No rash or lesion.         Left: No rash or lesion.       Vagina: Normal. No vaginal discharge.      Cervix: No cervical motion tenderness or discharge.      Uterus: Not enlarged and not tender.        Adnexa:         Right: No mass.          Left: No mass.     Musculoskeletal:         General: No tenderness.      Cervical back: Neck supple.   Lymphadenopathy:      Cervical: No cervical adenopathy.      Upper Body:      Right upper body: No supraclavicular, axillary or pectoral adenopathy.      Left upper body: No supraclavicular, axillary or pectoral adenopathy.   Neurological:      Mental Status: She is alert.         Assessment & Plan:   1. Encounter for gynecological examination without abnormal finding    2. Screening mammogram for breast cancer    3. Screening for cervical cancer    4. Family history of breast cancer in mother        Orders Placed This Encounter   Procedures    Hpv Dna  High Risk , Thin Prep Collect    Hpv Dna  High Risk , Thin Prep Collect    ThinPrep PAP Smear    ThinPrep PAP Smear    THINPREP PAP SMEAR ONLY    THINPREP PAP SMEAR ONLY       Meds This Visit:  Requested Prescriptions      No prescriptions requested or ordered in this encounter       Imaging & Referrals:  [facility] NUNO 2D+3D SCREENING BILAT (CPT=77067/[id number])

## 2025-07-03 NOTE — PROGRESS NOTES
The following individual(s) verbally consented to be recorded using ambient AI listening technology and understand that they can each withdraw their consent to this listening technology at any point by asking the clinician to turn off or pause the recording:    Patient name: Enma Pastrana  Additional names:

## 2025-07-07 LAB
HPV E6+E7 MRNA CVX QL NAA+PROBE: NEGATIVE
HPV E6+E7 MRNA CVX QL NAA+PROBE: NEGATIVE

## 2025-07-15 PROBLEM — Z80.3 FAMILY HISTORY OF BREAST CANCER IN MOTHER: Status: ACTIVE | Noted: 2025-07-15

## 2025-07-24 DIAGNOSIS — N83.8 OVARIAN MASS: ICD-10-CM

## 2025-07-24 DIAGNOSIS — C56.1 OVARIAN CANCER, RIGHT  (CMD): ICD-10-CM

## 2025-07-24 DIAGNOSIS — Z85.43 PERSONAL HISTORY OF OVARIAN CANCER: Primary | ICD-10-CM

## 2025-07-25 ENCOUNTER — LAB SERVICES (OUTPATIENT)
Dept: LAB | Age: 40
End: 2025-07-25

## 2025-07-25 ENCOUNTER — APPOINTMENT (OUTPATIENT)
Dept: LAB | Age: 40
End: 2025-07-25

## 2025-07-25 DIAGNOSIS — C56.1 MALIGNANT NEOPLASM OF RIGHT OVARY (CMD): Primary | ICD-10-CM

## 2025-07-25 LAB — CANCER AG125 SERPL-ACNC: 7 UNITS/ML (ref 0–35)

## 2025-07-25 PROCEDURE — 86304 IMMUNOASSAY TUMOR CA 125: CPT | Performed by: CLINICAL MEDICAL LABORATORY

## 2025-07-25 PROCEDURE — 36415 COLL VENOUS BLD VENIPUNCTURE: CPT | Performed by: CLINICAL MEDICAL LABORATORY

## 2025-08-07 PROBLEM — O16.9 HTN COMPLICATING PERIPREGNANCY, ANTEPARTUM (CMD): Status: RESOLVED | Noted: 2020-09-15 | Resolved: 2025-08-07

## 2025-08-07 PROBLEM — D39.11 OVARIAN TUMOR OF BORDERLINE MALIGNANCY, RIGHT: Status: ACTIVE | Noted: 2017-08-18

## 2025-08-07 PROBLEM — O14.10 SEVERE PREECLAMPSIA (CMD): Status: RESOLVED | Noted: 2020-09-15 | Resolved: 2025-08-07

## 2025-08-07 PROBLEM — O14.20 HELLP SYNDROME (CMD): Status: RESOLVED | Noted: 2020-09-15 | Resolved: 2025-08-07

## 2025-08-07 PROBLEM — I10 PRIMARY HYPERTENSION: Status: ACTIVE | Noted: 2021-12-08

## 2025-08-07 PROBLEM — D64.9 ANEMIA: Status: ACTIVE | Noted: 2025-08-07

## 2025-08-07 RX ORDER — LOSARTAN POTASSIUM 50 MG/1
50 TABLET ORAL DAILY
Qty: 90 TABLET | Refills: 3 | Status: SHIPPED | OUTPATIENT
Start: 2025-08-07

## 2025-08-12 ENCOUNTER — MED REC SCAN ONLY (OUTPATIENT)
Dept: INTERNAL MEDICINE CLINIC | Facility: CLINIC | Age: 40
End: 2025-08-12

## (undated) DEVICE — SURGICEL SNOW ABS 4X4

## (undated) DEVICE — Device

## (undated) DEVICE — SOLUTION IRRIG 1000ML 0.9% NACL USP BTL

## (undated) DEVICE — FENESTRATED BIPOLAR FORCEPS: Brand: ENDOWRIST

## (undated) DEVICE — TROCAR: Brand: KII FIOS FIRST ENTRY

## (undated) DEVICE — GLOVE SUR 7.5 SENSICARE PI PIP GRN PWD F

## (undated) DEVICE — AIRSEAL 5 MM ACCESS PORT AND LOW PROFILE OBTURATOR WITH BLADELESS OPTICAL TIP, 120 MM LENGTH: Brand: AIRSEAL

## (undated) DEVICE — GLOVE SUR 7 SENSICARE PI PIP CRM PWD F

## (undated) DEVICE — ENCORE® LATEX MICRO SIZE 6.5, STERILE LATEX POWDER-FREE SURGICAL GLOVE: Brand: ENCORE

## (undated) DEVICE — SUTURE VICRYL 0 UR-6

## (undated) DEVICE — BLADELESS OBTURATOR: Brand: WECK VISTA

## (undated) DEVICE — CANISTER SAFETOUCH SYST DISP

## (undated) DEVICE — SOL  .9 1000ML BTL

## (undated) DEVICE — ADHESIVE SKIN TOP FOR WND CLSR DERMBND ADV

## (undated) DEVICE — D AND C PACK: Brand: MEDLINE INDUSTRIES, INC.

## (undated) DEVICE — SUT COAT VCRL+ 0 27IN UR-5 ABSRB VLT ANTIBACT

## (undated) DEVICE — SUT PROL 0 30IN CT-1 NABSRB BLU L36MM 1/2 CIR

## (undated) DEVICE — 60 ML SYRINGE LUER-LOCK TIP: Brand: MONOJECT

## (undated) DEVICE — INSUFFLATION NEEDLE TO ESTABLISH PNEUMOPERITONEUM.: Brand: INSUFFLATION NEEDLE

## (undated) DEVICE — SEAL

## (undated) DEVICE — MEGA SUTURECUT ND: Brand: ENDOWRIST

## (undated) DEVICE — VCARE MEDIUM, UTERINE MANIPULATOR, VAGINAL-CERVICAL-AHLUWALIA'S-RETRACTOR-ELEVATOR: Brand: VCARE

## (undated) DEVICE — LUBRICANT JLY SURGILUBE 2OZ

## (undated) DEVICE — LIGASURE LAP MARYLAND 37CM

## (undated) DEVICE — CANISTER SCT BTL SL CAP BRKLY

## (undated) DEVICE — STERILE SURGICAL LUBRICANT, METAL TUBE: Brand: SURGILUBE

## (undated) DEVICE — INJECTOR MANIP L13CM DIA5MM BLLN TIP KRONNER

## (undated) DEVICE — DERMABOND LIQUID ADHESIVE

## (undated) DEVICE — AIRSEAL TRI-LUMEN LILTERED TUBE SET: Brand: AIRSEAL

## (undated) DEVICE — ROBOTIC: Brand: MEDLINE INDUSTRIES, INC.

## (undated) DEVICE — STIRRUP STRAP W/SLING RING

## (undated) DEVICE — SUCTION CANISTER, 3000CC,SAFELINER: Brand: DEROYAL

## (undated) DEVICE — ARM DRAPE

## (undated) DEVICE — PROGRASP FORCEPS: Brand: ENDOWRIST

## (undated) DEVICE — JELLY,LUBE,STERILE,FLIP TOP,TUBE,2-OZ: Brand: MEDLINE

## (undated) DEVICE — SUTURE PLAIN GUT 3-0 CT-1

## (undated) DEVICE — TRAY CATH FOLEY 16FR INCLUDE BARDX IC COMPLT CARE

## (undated) DEVICE — TUBING SUCTION COLLECTION SET

## (undated) DEVICE — PAD PREP 24X43.5IN W/ PCH

## (undated) DEVICE — ANCHOR TISSUE RETRIEVAL SYSTEM, BAG SIZE 175 ML, PORT SIZE 10 MM: Brand: ANCHOR TISSUE RETRIEVAL SYSTEM

## (undated) DEVICE — 20 ML SYRINGE LUER-LOCK TIP: Brand: MONOJECT

## (undated) DEVICE — SUT MCRYL 4-0 18IN PS-2 ABSRB UD 19MM 3/8 CIR

## (undated) DEVICE — MONOPOLAR CURVED SCISSORS: Brand: ENDOWRIST

## (undated) DEVICE — 3M™ STERI-DRAPE™ PATIENT ISOLATION DRAPE 1014: Brand: STERI-DRAPE™

## (undated) DEVICE — STERILE LATEX POWDER-FREE SURGICAL GLOVESWITH NITRILE COATING: Brand: PROTEXIS

## (undated) DEVICE — TISSUE RETRIEVAL SYSTEM: Brand: INZII RETRIEVAL SYSTEM

## (undated) DEVICE — SKIN PREP TRAY 4 COMPARTM TRAY: Brand: MEDLINE INDUSTRIES, INC.

## (undated) DEVICE — APPLICATOR PREP 26ML CHG 2% ISO ALC 70%

## (undated) DEVICE — BLUNT TIP LAPAROSCOPIC SEALER/DIVIDER NANO-COATED: Brand: LIGASURE

## (undated) DEVICE — [HIGH FLOW INSUFFLATOR,  DO NOT USE IF PACKAGE IS DAMAGED,  KEEP DRY,  KEEP AWAY FROM SUNLIGHT,  PROTECT FROM HEAT AND RADIOACTIVE SOURCES.]: Brand: PNEUMOSURE

## (undated) DEVICE — TIP COVER ACCESSORY

## (undated) DEVICE — CURRETTE 10MM CVD

## (undated) DEVICE — PAD POS 36IN DISP SURGYPAD

## (undated) DEVICE — TROCAR: Brand: KII SLEEVE

## (undated) DEVICE — ANTIBACTERIAL UNDYED BRAIDED (POLYGLACTIN 910), SYNTHETIC ABSORBABLE SUTURE: Brand: COATED VICRYL

## (undated) DEVICE — COVER SGL STRL LGHT HNDL BLU

## (undated) DEVICE — COLUMN DRAPE

## (undated) DEVICE — SLIM BODY SKIN STAPLER: Brand: APPOSE ULC

## (undated) DEVICE — SOLUTION IRRIG 3000ML 0.9% NACL FLX CONT

## (undated) DEVICE — DRAPE,ROBOTICS,STERILE: Brand: MEDLINE

## (undated) DEVICE — CONTAINER SPEC STR 4OZ GRY LID

## (undated) DEVICE — UNDYED BRAIDED (POLYGLACTIN 910), SYNTHETIC ABSORBABLE SUTURE: Brand: COATED VICRYL

## (undated) DEVICE — LAPAROSCOPY: Brand: MEDLINE INDUSTRIES, INC.

## (undated) DEVICE — PAD,NON-ADHERENT,3X8,STERILE,LF,1/PK: Brand: MEDLINE

## (undated) DEVICE — VISUALIZATION SYSTEM: Brand: CLEARIFY

## (undated) DEVICE — SOL  .9 3000ML

## (undated) NOTE — LETTER
MINOR CASE LETTER      7/18/2017        Dear Lee Sandy are having an operative laparoscopy with right ovarian cystectomy, possible oophorectomy   on 8/18/17 at 1pm.    Do not eat or drink anything (including water) after midnight the night before surge

## (undated) NOTE — Clinical Note
Continue care with Dr. Cotter  2 and cervical length ultrasound at 20 weeksFollow-up Growth ultrasound at 32 weeks. Weekly NST's at 36 weeks.

## (undated) NOTE — LETTER
AUTHORIZATION FOR SURGICAL OPERATION OR OTHER PROCEDURE    1. I hereby authorize Dr. Pavel Bojorquez, and [facility] staff assigned to my case to perform the following operation and/or procedure at the [facility]ope: [facility] insertion         2. My physician has explained the nature and purpose of the operation or other procedure, possible alternative methods of treatment, the risks involved, and the possibility of complication to me. I acknowledge that no guarantee has been made as to the result that may be obtained. 3.  I recognize that, during the course of this operation, or other procedure, unforseen conditions may necessitate additional or different procedure than those listed above. I, therefore, further authorize and request that the above named physician, his/her physician assistants or designees perform such procedures as are, in his/her professional opinion, necessary and desirable. 4.  Any tissue or organs removed in the operation or other procedure may be disposed of by and at the discretion of the [facility] and [facility]. 5.  I understand that in the event of a medical emergency, I will be transported by local paramedics to [facility] or other hospital emergency department. 6.  I certify that I have read and fully understand the above consent to operation and/or other procedure. 7.  I acknowledge that my physician has explained sedation/analgesia administration to me including the risks and benefits. I consent to the administration of sedation/analgesia as may be necessary or desirable in the judgement of my physician. Witness signature: ___________________________________________________ Date:  ______/______/_____                    Time:  ________ A. M.  P.M.        Patient Name:  Mariajose Pastrana_____________________________  (please print)      Patient signature:  ___________________________________________________             Relationship to Patient:           []  Parent    Responsible person                          []  Spouse  In case of minor or                    [] Other  _____________   Incompetent name:  __________________________________________________                               (please print)      _____________      Responsible person  In case of minor or  Incompetent signature:  _______________________________________________    Statement of Physician  My signature below affirms that prior to the time of the procedure, I have explained to the patient and/or his/her guardian, the risks and benefits involved in the proposed treatment and any reasonable alternative to the proposed treatment. I have also explained the risks and benefits involved in the refusal of the proposed treatment and have answered the patient's questions.                         Date:  ______/______/_______  Provider                      Signature:  __________________________________________________________       Time:  ___________ A.M    P.M.

## (undated) NOTE — MR AVS SNAPSHOT
[place]  [address]  [phone]               Thank you for choosing us for your health care visit with Luiz Cotter DO.   We are glad to serve you and happy to provide you with this summ by Each Nare route daily as needed for Rhinitis. Commonly known as:  FLONASE           HYDROcodone-acetaminophen 5-325 MG Tabs   Take 1 tablet by mouth.    Commonly known as:  NORCO           LACTASE ENZYME 3000 units Tabs   Generic drug:  lactase   Take Healthy Diet and Regular Exercise  [employer] [facility] for making healthy food choices  -   Enjoy your food, but eat less. Fully enjoy your food when eating. Don’t eat while distracted and slow down. Avoid over sized portions.    Ayo Reynolds

## (undated) NOTE — LETTER
[address]  [address] [facility]  Authorization for Surgical Operation and Procedure     Date:___________                                                                                                         Time:__________ reactions, transmission of diseases such as Hepatitis, AIDS and Cytomegalovirus (CMV) and fluid overload. In the event that I wish to have an autologous transfusion of my own blood, or a directed donor transfusion. I will discuss this with my physician. ends for purposes of reinstating the DNAR order.   10. Patients having a sterilization procedure: I understand that if the procedure is successful the results will be permanent and it will therefore be impossible for me to inseminate, conceive, or bear chil using an “IV” to give memedicine, fluids or blood during my procedure, and having a breathing tube placed to help me breathe when I’m asleep (intubation).  In the event that my heart stops working properly, I understand that my anesthesiologist will make ev bleeding, infection, seizure, irregular heart rhythms, and nerve injury.     I can change my mind about having anesthesia services at any time before I get the medicine.    _____________________________________________________________________________  Kassidy Lynch

## (undated) NOTE — LETTER
INSTRUCTIONS FOR PRE-SURGICAL   ANTIMICROBIAL BATH/SHOWER    Your doctor has recommended a pre-surgical CHG (chlorhexidine gluconate) shower/bath with Betasept (also sold as Hibiclens). It reduces bacteria that can potentially cause infection.   Betasept Keep out of reach of children. If swallowed get medica help or contact [facility]. Store between 60-80 degrees F. Fabric Warning! CHG WILL STAIN YOUR FABRICS! Use with care around shower curtains, towels washcloths rugs and clothes.   Wipe

## (undated) NOTE — LETTER
[address]  [facility] [address]    CONSENT FOR EXAMINATION AND DISPOSITION OF FETUS                    I hereby maricarmen permission to Punctil, to perform an examination of the fetus de Patient Name: Fatou Richards     : 1985                 Printed: 2019      Medical Record #: [id number]                                              Page 1 of 1